# Patient Record
Sex: FEMALE | Race: WHITE | NOT HISPANIC OR LATINO | Employment: OTHER | URBAN - NONMETROPOLITAN AREA
[De-identification: names, ages, dates, MRNs, and addresses within clinical notes are randomized per-mention and may not be internally consistent; named-entity substitution may affect disease eponyms.]

---

## 2019-10-09 ENCOUNTER — TRANSCRIBE ORDERS (OUTPATIENT)
Dept: ADMINISTRATIVE | Facility: HOSPITAL | Age: 84
End: 2019-10-09

## 2019-10-09 DIAGNOSIS — R26.89 OTHER ABNORMALITIES OF GAIT AND MOBILITY: ICD-10-CM

## 2019-10-09 DIAGNOSIS — R47.89 OTHER SPEECH DISTURBANCES: Primary | ICD-10-CM

## 2019-10-11 ENCOUNTER — HOSPITAL ENCOUNTER (OUTPATIENT)
Dept: CT IMAGING | Facility: HOSPITAL | Age: 84
Discharge: HOME | End: 2019-10-11
Admitting: PHYSICIAN ASSISTANT

## 2019-10-11 DIAGNOSIS — R47.89 OTHER SPEECH DISTURBANCES: ICD-10-CM

## 2019-10-11 DIAGNOSIS — R26.89 OTHER ABNORMALITIES OF GAIT AND MOBILITY: ICD-10-CM

## 2019-10-11 PROCEDURE — 70450 CT HEAD/BRAIN W/O DYE: CPT

## 2019-12-16 ENCOUNTER — NURSING HOME (OUTPATIENT)
Dept: INTERNAL MEDICINE | Facility: CLINIC | Age: 84
End: 2019-12-16

## 2019-12-16 DIAGNOSIS — J06.9 VIRAL UPPER RESPIRATORY TRACT INFECTION: Primary | ICD-10-CM

## 2019-12-16 RX ORDER — BENZONATATE 100 MG/1
200 CAPSULE ORAL 3 TIMES DAILY PRN
Qty: 60 CAPSULE | Refills: 0 | Status: SHIPPED | OUTPATIENT
Start: 2019-12-16 | End: 2020-01-14 | Stop reason: SDUPTHER

## 2019-12-16 RX ORDER — AZITHROMYCIN 250 MG/1
TABLET, FILM COATED ORAL
Qty: 6 TABLET | Refills: 0 | OUTPATIENT
Start: 2019-12-16 | End: 2020-09-11

## 2019-12-19 ENCOUNTER — TELEPHONE (OUTPATIENT)
Dept: INTERNAL MEDICINE | Facility: CLINIC | Age: 84
End: 2019-12-19

## 2019-12-19 NOTE — TELEPHONE ENCOUNTER
I called pt's daughter, Cecile to see how she is feeling and she doesn't have her voice back although her cough is better.  She has not gotten any worse although she feel's she's better and appreciated us checking in on her.

## 2019-12-20 ENCOUNTER — OFFICE VISIT (OUTPATIENT)
Dept: INTERNAL MEDICINE | Facility: CLINIC | Age: 84
End: 2019-12-20

## 2019-12-20 VITALS
HEIGHT: 65 IN | OXYGEN SATURATION: 96 % | BODY MASS INDEX: 33.76 KG/M2 | SYSTOLIC BLOOD PRESSURE: 134 MMHG | RESPIRATION RATE: 18 BRPM | DIASTOLIC BLOOD PRESSURE: 74 MMHG | TEMPERATURE: 98.3 F | HEART RATE: 51 BPM | WEIGHT: 202.6 LBS

## 2019-12-20 DIAGNOSIS — H61.23 BILATERAL IMPACTED CERUMEN: ICD-10-CM

## 2019-12-20 DIAGNOSIS — J04.0 LARYNGITIS: ICD-10-CM

## 2019-12-20 DIAGNOSIS — R05.9 COUGH: Primary | ICD-10-CM

## 2019-12-20 DIAGNOSIS — J98.11 ATELECTASIS OF LEFT LUNG: ICD-10-CM

## 2019-12-20 PROCEDURE — 99214 OFFICE O/P EST MOD 30 MIN: CPT | Performed by: NURSE PRACTITIONER

## 2019-12-20 PROCEDURE — 69210 REMOVE IMPACTED EAR WAX UNI: CPT | Performed by: NURSE PRACTITIONER

## 2019-12-20 RX ORDER — BUSPIRONE HYDROCHLORIDE 5 MG/1
5 TABLET ORAL 3 TIMES DAILY
COMMUNITY
End: 2020-01-09 | Stop reason: SDUPTHER

## 2019-12-20 RX ORDER — MEMANTINE HYDROCHLORIDE 7 MG/1
28 CAPSULE, EXTENDED RELEASE ORAL DAILY
COMMUNITY
End: 2020-01-09 | Stop reason: SDUPTHER

## 2019-12-20 RX ORDER — AMLODIPINE BESYLATE AND BENAZEPRIL HYDROCHLORIDE 5; 20 MG/1; MG/1
1 CAPSULE ORAL DAILY
COMMUNITY
End: 2020-01-09 | Stop reason: SDUPTHER

## 2019-12-20 RX ORDER — FUROSEMIDE 20 MG/1
20 TABLET ORAL DAILY
COMMUNITY
End: 2020-01-09 | Stop reason: SDUPTHER

## 2019-12-20 RX ORDER — FEXOFENADINE HCL 180 MG/1
180 TABLET ORAL DAILY
Qty: 14 TABLET | Refills: 0 | Status: SHIPPED | OUTPATIENT
Start: 2019-12-20 | End: 2020-01-14 | Stop reason: SDUPTHER

## 2019-12-20 RX ORDER — METOPROLOL SUCCINATE 50 MG/1
50 TABLET, EXTENDED RELEASE ORAL DAILY
COMMUNITY
End: 2020-01-09 | Stop reason: SDUPTHER

## 2019-12-20 RX ORDER — CEFDINIR 300 MG/1
300 CAPSULE ORAL 2 TIMES DAILY
Qty: 20 CAPSULE | Refills: 0 | OUTPATIENT
Start: 2019-12-20 | End: 2020-09-11

## 2019-12-20 RX ORDER — ESCITALOPRAM OXALATE 20 MG/1
20 TABLET ORAL DAILY
COMMUNITY
End: 2020-01-09 | Stop reason: SDUPTHER

## 2019-12-20 NOTE — PROGRESS NOTES
"        Subjective     Chief Complaint   Patient presents with   • Laryngitis     Patient states that she was seen by Dr. Vargas at the [facility] on Monday.   • Nasal Congestion   • Cough       History of Present Illness  Pt was seen by Dr. Vargas on Monday for laryngitis and a cough. She was placed on azithromycin and tessalon. Her cough is some better, but the laryngitis has persisted. Still with some cough but is non-productive. States no fevers. No chest pain. No shortness of breath.     Patient's PMR from outside medical facility reviewed and noted.    Review of Systems   Otherwise complete ROS reviewed and negative except as mentioned in the HPI.    Past Medical History:   Past Medical History:   Diagnosis Date   • Dementia (CMS/HCC)    • Hypertension      Past Surgical History:  Past Surgical History:   Procedure Laterality Date   • HYSTERECTOMY     • KNEE MENISCAL REPAIR       Social History:  reports that she has never smoked. She has never used smokeless tobacco. She reports that she does not drink alcohol or use drugs.    Family History: family history is not on file.      Allergies:  No Known Allergies  Medications:  Prior to Admission medications    Medication Sig Start Date End Date Taking? Authorizing Provider   azithromycin (ZITHROMAX Z-MIRLANDE) 250 MG tablet Take 2 tablets the first day, then 1 tablet daily for 4 days. 12/16/19   Dmitriy Vargas DO   benzonatate (TESSALON PERLES) 100 MG capsule Take 2 capsules by mouth 3 (Three) Times a Day As Needed for Cough. 12/16/19   Dmitriy Vargas,        Objective     Vital Signs: /74 (BP Location: Right arm, Patient Position: Sitting, Cuff Size: Adult)   Pulse 51   Temp 98.3 °F (36.8 °C) (Oral)   Resp 18   Ht 165.1 cm (65\")   Wt 91.9 kg (202 lb 9.6 oz)   SpO2 96%   BMI 33.71 kg/m²     Physical Exam   Constitutional: She appears well-developed and well-nourished.   HENT:   Head: Normocephalic and atraumatic.   Right Ear: cerumen " impaction is present.  Left Ear: An impacted cerumen is present.  Mouth/Throat: Posterior oropharyngeal erythema ( drainage noted posterior pharynx. ) present.   Eyes: Pupils are equal, round, and reactive to light. EOM are normal.   Neck: Normal range of motion. Neck supple. No JVD present.   Cardiovascular: Normal rate and regular rhythm.   Pulmonary/Chest: Effort normal.   Coarse right lung base. Mildly diminished in the left lung base.    Abdominal: Soft. Bowel sounds are normal.   Musculoskeletal: She exhibits no edema or deformity.   Lymphadenopathy:     She has no cervical adenopathy.   Neurological: She is alert.   Skin: Skin is warm and dry.   Psychiatric: She has a normal mood and affect. Her behavior is normal. Judgment and thought content normal.   Vitals reviewed.    Results Reviewed:  No results found for: GLUCOSE, BUN, CREATININE, NA, K, CL, CO2, CALCIUM, ALT, AST, WBC, HCT, PLT, CHOL, TRIG, HDL, LDL, LDLHDL, HGBA1C      Assessment / Plan     Assessment/Plan:  Nichole was seen today for laryngitis, nasal congestion and cough.    Diagnoses and all orders for this visit:    Cough  -     XR Chest PA & Lateral (In Office)  - Pt declines cough syrup. Wants to continue tessalon.     Laryngitis    Bilateral impacted cerumen  /Ear Cerumen Removal  Date/Time: 12/20/2019 11:13 AM  Performed by: Bharti Nolan APRN  Authorized by: Bharti Nolan APRN     Anesthesia:  Local Anesthetic: none  Location details: left ear and right ear  Patient tolerance: Patient tolerated the procedure well with no immediate complications  Comments: Post removal of cerumen left ear shows normal TM. Irrigation right ear with mild improvement. TM intact without exudate.   Procedure type: instrumentation (Currette in the left ear with large amount of cerumen removed left ear. )      Sedation:  Patient sedated: no        Atelectasis of left lung/Early pneumonia        - Will have her complete Azithromycin and add  omnicef.     Other orders  -     fexofenadine (ALLEGRA ALLERGY) 180 MG tablet; Take 1 tablet by mouth Daily.  -     cefdinir (OMNICEF) 300 MG capsule; Take 1 capsule by mouth 2 (Two) Times a Day.      No follow-ups on file. Dr. Vargas to follow up at [facility].     I have discussed the patient results/orders and and plan/recommendation with them at today's visit.      Bharti Nolan, APRN   12/20/2019

## 2019-12-21 NOTE — PROGRESS NOTES
"        Subjective     No chief complaint on file.  Cough    History of Present Illness  Patient is currently living at the [facility].  Caregiver states that she does not drink enough.  She has had no shortness of breath, and ate lunch in the community room today.  She has had worsening cough and congestion.  She denies any fever or chills, and tells me she always has a runny nose.  She is had no color change to her sputum.  She denies any sore throat, or ear pain.  She states she has a sickness, and has a hoarse voice.  She says\" I have this occasionally.  She has been using over-the-counter cough and cold medications that have not been helping.  She is unable to take liquid because it causes vomiting.    Patient's PMR from outside medical facility reviewed and noted.    Review of Systems   Constitutional: Negative for chills and fever.   HENT: Positive for congestion. Negative for sneezing and sore throat.    Eyes: Negative for discharge and redness.   Respiratory: Positive for cough. Negative for shortness of breath.         Hoarse voice   Cardiovascular: Negative for chest pain.   Gastrointestinal: Negative for constipation, diarrhea, nausea and vomiting.   Genitourinary: Negative for dyspareunia and hematuria.   Skin: Negative for color change and wound.   Psychiatric/Behavioral:        Chronic memory impairment        Otherwise complete ROS reviewed and negative except as mentioned in the HPI.    Past Medical History:   Past Medical History:   Diagnosis Date   • Dementia (CMS/HCC)    • Hypertension      Past Surgical History:  Past Surgical History:   Procedure Laterality Date   • HYSTERECTOMY     • KNEE MENISCAL REPAIR       Social History:  reports that she has never smoked. She has never used smokeless tobacco. She reports that she does not drink alcohol or use drugs.    Family History: Heart disease    Allergies:  No Known Allergies  Medications:  Prior to Admission medications    Medication Sig Start " Date End Date Taking? Authorizing Provider   amLODIPine-benazepril (LOTREL 5-20) 5-20 MG per capsule Take 1 capsule by mouth Daily.    Delilah Meade MD   azithromycin (ZITHROMAX Z-MIRLANDE) 250 MG tablet Take 2 tablets the first day, then 1 tablet daily for 4 days. 12/16/19   Dmitriy Vargas DO   benzonatate (TESSALON PERLES) 100 MG capsule Take 2 capsules by mouth 3 (Three) Times a Day As Needed for Cough. 12/16/19   Dmitriy Vargas DO   busPIRone (BUSPAR) 5 MG tablet Take 5 mg by mouth 3 (Three) Times a Day.    Delilah Meade MD   cefdinir (OMNICEF) 300 MG capsule Take 1 capsule by mouth 2 (Two) Times a Day. 12/20/19   Bharti Nolan APRN   escitalopram (LEXAPRO) 20 MG tablet Take 20 mg by mouth Daily.    Delilah Meade MD   fexofenadine (ALLEGRA ALLERGY) 180 MG tablet Take 1 tablet by mouth Daily. 12/20/19   Bharti Nolan APRN   furosemide (LASIX) 20 MG tablet Take 20 mg by mouth Daily.    Delilah Meade MD   memantine (NAMENDA XR) 7 MG capsule sustained-release 24 hr extended release capsule Take 28 mg by mouth Daily.    Delilah Meade MD   metoprolol succinate XL (TOPROL-XL) 50 MG 24 hr tablet Take 50 mg by mouth Daily.    Delilah Meade MD       Objective     Vital Signs: There were no vitals taken for this visit.  Physical Exam   HENT:   Head: Normocephalic and atraumatic.   Nose: Nose normal.   Mouth/Throat: Oropharynx is clear and moist.   Voice is hoarse.  Clear rhinorrhea.   Eyes: Conjunctivae and EOM are normal.   Neck: Normal range of motion. Neck supple.   Cardiovascular: Normal rate, regular rhythm and normal heart sounds.   Pulmonary/Chest: Effort normal and breath sounds normal. No respiratory distress. She has no wheezes.   Abdominal: Soft. Bowel sounds are normal.   Musculoskeletal: She exhibits no edema or tenderness.   Neurological: She is alert. No cranial nerve deficit.   Skin: Skin is warm and dry.   Psychiatric: She has a  normal mood and affect.   Vitals reviewed.      Patient's There is no height or weight on file to calculate BMI. BMI is above normal parameters. Recommendations include: none (medical contraindication).      Results Reviewed:  No results found for: GLUCOSE, BUN, CREATININE, NA, K, CL, CO2, CALCIUM, ALT, AST, WBC, HCT, PLT, CHOL, TRIG, HDL, LDL, LDLHDL, HGBA1C      Assessment / Plan     Assessment/Plan:  1. Viral upper respiratory tract infection  - benzonatate (TESSALON PERLES) 100 MG capsule; Take 2 capsules by mouth 3 (Three) Times a Day As Needed for Cough.  Dispense: 60 capsule; Refill: 0  - azithromycin (ZITHROMAX Z-MIRLANDE) 250 MG tablet; Take 2 tablets the first day, then 1 tablet daily for 4 days.  Dispense: 6 tablet; Refill: 0        Return in about 4 weeks (around 1/13/2020) for Next scheduled follow up. unless patient needs to be seen sooner or acute issues arise.      I have discussed the patient results/orders and and plan/recommendation with them at today's visit.      Dmitriy Vargas, DO   12/16/2019

## 2019-12-23 ENCOUNTER — TELEPHONE (OUTPATIENT)
Dept: INTERNAL MEDICINE | Facility: CLINIC | Age: 84
End: 2019-12-23

## 2019-12-24 ENCOUNTER — NURSE TRIAGE (OUTPATIENT)
Dept: CALL CENTER | Facility: HOSPITAL | Age: 84
End: 2019-12-24

## 2019-12-24 NOTE — TELEPHONE ENCOUNTER
"Asking for something to be called in for vomiting to [place] [facility]. States she has the stomach bug and has had vomiting and diarrhea all night. Denies any allergies other than percocet. Denies cardiac problems. Called and spoke with Dr. Vargas, notified her of request. Order received to call in Zofran 8 mg ODT every 6 hours prn vomiting, dispense 30 tablets. May also use tylenol and ibuprofen for fever if needed. Stay hydrated. If s/s worsening, will need to be seen in ER.     Reason for Disposition  • [1] Request for URGENT new prescription or refill of \"essential\" medication (i.e., likelihood of harm to patient if not taken) AND [2] triager unable to fill per unit policy    Additional Information  • Negative: Drug overdose and nurse unable to answer question  • Negative: Caller requesting information not related to medicine  • Negative: Caller requesting a prescription for Strep throat and has a positive culture result  • Negative: Rash while taking a medication or within 3 days of stopping it  • Negative: Immunization reaction suspected  • Negative: [1] Asthma and [2] having symptoms of asthma (cough, wheezing, etc)  • Negative: MORE THAN A DOUBLE DOSE of a prescription or over-the-counter (OTC) drug  • Negative: [1] DOUBLE DOSE (an extra dose or lesser amount) of over-the-counter (OTC) drug AND [2] any symptoms (e.g., dizziness, nausea, pain, sleepiness)  • Negative: [1] DOUBLE DOSE (an extra dose or lesser amount) of prescription drug AND [2] any symptoms (e.g., dizziness, nausea, pain, sleepiness)  • Negative: Took another person's prescription drug  • Negative: [1] DOUBLE DOSE (an extra dose or lesser amount) of prescription drug AND [2] NO symptoms (Exception: a double dose of antibiotics)  • Negative: Diabetes drug error or overdose (e.g., insulin or extra dose)  • Negative: [1] Prescription not at pharmacy AND [2] was prescribed today by PCP  • Negative: Pharmacy calling with prescription questions and " "triager unable to answer question  • Negative: Caller has URGENT medication question about med that PCP prescribed and triager unable to answer question    Answer Assessment - Initial Assessment Questions  1. SYMPTOMS: \"Do you have any symptoms?\"      See note  2. SEVERITY: If symptoms are present, ask \"Are they mild, moderate or severe?\"      See note    Protocols used: MEDICATION QUESTION CALL-ADULT-      "

## 2019-12-26 ENCOUNTER — TELEPHONE (OUTPATIENT)
Dept: INTERNAL MEDICINE | Facility: CLINIC | Age: 84
End: 2019-12-26

## 2019-12-26 NOTE — TELEPHONE ENCOUNTER
If her cough is better and her laryngitis is better ok to leave off the omnicef. It can cause nausea/vomiting and diarrhea. She should be ok without the omnicef if her symptoms are better.

## 2019-12-26 NOTE — TELEPHONE ENCOUNTER
Pt daughter Cecile called [facility] that they thought pt's antibiotic may have been causing her to have the vomitting/diarrhea,  She finished the zitho antibiotic, but they stopped the other one and her symptons have stopped,   She said she never felt bad and didn't think it was a bug,    Does she need to be on another antibiotic?  (note from Nurse from 12/24/2019 in chart also)

## 2020-01-01 ENCOUNTER — APPOINTMENT (OUTPATIENT)
Dept: CT IMAGING | Facility: HOSPITAL | Age: 85
End: 2020-01-01

## 2020-01-01 ENCOUNTER — APPOINTMENT (OUTPATIENT)
Dept: GENERAL RADIOLOGY | Facility: HOSPITAL | Age: 85
End: 2020-01-01

## 2020-01-01 ENCOUNTER — READMISSION MANAGEMENT (OUTPATIENT)
Dept: CALL CENTER | Facility: HOSPITAL | Age: 85
End: 2020-01-01

## 2020-01-01 ENCOUNTER — HOSPITAL ENCOUNTER (INPATIENT)
Facility: HOSPITAL | Age: 85
LOS: 3 days | Discharge: HOME HEALTH | End: 2020-11-11
Attending: FAMILY MEDICINE | Admitting: FAMILY MEDICINE

## 2020-01-01 ENCOUNTER — HOSPITAL ENCOUNTER (OUTPATIENT)
Facility: HOSPITAL | Age: 85
Setting detail: OBSERVATION
Discharge: HOME | End: 2020-12-04
Attending: EMERGENCY MEDICINE | Admitting: FAMILY MEDICINE

## 2020-01-01 ENCOUNTER — APPOINTMENT (OUTPATIENT)
Dept: CARDIOLOGY | Facility: HOSPITAL | Age: 85
End: 2020-01-01

## 2020-01-01 VITALS
OXYGEN SATURATION: 100 % | SYSTOLIC BLOOD PRESSURE: 154 MMHG | WEIGHT: 182 LBS | BODY MASS INDEX: 31.07 KG/M2 | RESPIRATION RATE: 14 BRPM | TEMPERATURE: 97.8 F | DIASTOLIC BLOOD PRESSURE: 66 MMHG | HEART RATE: 64 BPM | HEIGHT: 64 IN

## 2020-01-01 VITALS
TEMPERATURE: 97.5 F | DIASTOLIC BLOOD PRESSURE: 55 MMHG | BODY MASS INDEX: 29.81 KG/M2 | OXYGEN SATURATION: 95 % | HEIGHT: 65 IN | HEART RATE: 56 BPM | WEIGHT: 178.9 LBS | RESPIRATION RATE: 18 BRPM | SYSTOLIC BLOOD PRESSURE: 137 MMHG

## 2020-01-01 VITALS
TEMPERATURE: 97.7 F | BODY MASS INDEX: 32.42 KG/M2 | HEART RATE: 50 BPM | DIASTOLIC BLOOD PRESSURE: 82 MMHG | WEIGHT: 182.98 LBS | HEIGHT: 63 IN | RESPIRATION RATE: 18 BRPM | OXYGEN SATURATION: 98 % | SYSTOLIC BLOOD PRESSURE: 142 MMHG

## 2020-01-01 DIAGNOSIS — R41.82 ALTERED MENTAL STATUS, UNSPECIFIED ALTERED MENTAL STATUS TYPE: ICD-10-CM

## 2020-01-01 DIAGNOSIS — R77.8 ELEVATED TROPONIN: ICD-10-CM

## 2020-01-01 DIAGNOSIS — R41.82 ALTERED MENTAL STATUS, UNSPECIFIED ALTERED MENTAL STATUS TYPE: Primary | ICD-10-CM

## 2020-01-01 DIAGNOSIS — I26.09 ACUTE PULMONARY EMBOLISM WITH ACUTE COR PULMONALE, UNSPECIFIED PULMONARY EMBOLISM TYPE (HCC): Primary | ICD-10-CM

## 2020-01-01 DIAGNOSIS — F03.90 DEMENTIA WITHOUT BEHAVIORAL DISTURBANCE, UNSPECIFIED DEMENTIA TYPE: ICD-10-CM

## 2020-01-01 DIAGNOSIS — R13.10 DYSPHAGIA, UNSPECIFIED TYPE: ICD-10-CM

## 2020-01-01 DIAGNOSIS — N39.0 ACUTE UTI: ICD-10-CM

## 2020-01-01 LAB
ALBUMIN SERPL-MCNC: 2.8 G/DL (ref 3.5–5.2)
ALBUMIN SERPL-MCNC: 3.3 G/DL (ref 3.5–5.2)
ALBUMIN SERPL-MCNC: 3.4 G/DL (ref 3.5–5.2)
ALBUMIN/GLOB SERPL: 1.4 G/DL
ALBUMIN/GLOB SERPL: 1.4 G/DL
ALBUMIN/GLOB SERPL: 1.5 G/DL
ALP SERPL-CCNC: 103 U/L (ref 39–117)
ALP SERPL-CCNC: 104 U/L (ref 39–117)
ALP SERPL-CCNC: 123 U/L (ref 39–117)
ALT SERPL W P-5'-P-CCNC: 11 U/L (ref 1–33)
ALT SERPL W P-5'-P-CCNC: 16 U/L (ref 1–33)
ALT SERPL W P-5'-P-CCNC: 9 U/L (ref 1–33)
ANION GAP SERPL CALCULATED.3IONS-SCNC: 4 MMOL/L (ref 5–15)
ANION GAP SERPL CALCULATED.3IONS-SCNC: 4 MMOL/L (ref 5–15)
ANION GAP SERPL CALCULATED.3IONS-SCNC: 6 MMOL/L (ref 5–15)
ANION GAP SERPL CALCULATED.3IONS-SCNC: 6 MMOL/L (ref 5–15)
ANION GAP SERPL CALCULATED.3IONS-SCNC: 7 MMOL/L (ref 5–15)
ANION GAP SERPL CALCULATED.3IONS-SCNC: 8 MMOL/L (ref 5–15)
APTT PPP: 41.4 SECONDS (ref 24.1–35)
ARTERIAL PATENCY WRIST A: POSITIVE
ARTERIAL PATENCY WRIST A: POSITIVE
AST SERPL-CCNC: 18 U/L (ref 1–32)
AST SERPL-CCNC: 23 U/L (ref 1–32)
AST SERPL-CCNC: 33 U/L (ref 1–32)
ATMOSPHERIC PRESS: 754 MMHG
ATMOSPHERIC PRESS: 757 MMHG
B PARAPERT DNA SPEC QL NAA+PROBE: NOT DETECTED
B PERT DNA SPEC QL NAA+PROBE: NOT DETECTED
BACTERIA SPEC AEROBE CULT: ABNORMAL
BACTERIA SPEC AEROBE CULT: NORMAL
BACTERIA UR QL AUTO: ABNORMAL /HPF
BACTERIA UR QL AUTO: ABNORMAL /HPF
BASE EXCESS BLDA CALC-SCNC: 3.7 MMOL/L (ref 0–2)
BASE EXCESS BLDA CALC-SCNC: 7 MMOL/L (ref 0–2)
BASOPHILS # BLD AUTO: 0.05 10*3/MM3 (ref 0–0.2)
BASOPHILS # BLD AUTO: 0.07 10*3/MM3 (ref 0–0.2)
BASOPHILS # BLD AUTO: 0.08 10*3/MM3 (ref 0–0.2)
BASOPHILS NFR BLD AUTO: 0.7 % (ref 0–1.5)
BASOPHILS NFR BLD AUTO: 0.9 % (ref 0–1.5)
BASOPHILS NFR BLD AUTO: 1 % (ref 0–1.5)
BDY SITE: ABNORMAL
BDY SITE: ABNORMAL
BH CV ECHO MEAS - AO MAX PG (FULL): 8.4 MMHG
BH CV ECHO MEAS - AO MAX PG: 10.2 MMHG
BH CV ECHO MEAS - AO MEAN PG (FULL): 5 MMHG
BH CV ECHO MEAS - AO MEAN PG: 6 MMHG
BH CV ECHO MEAS - AO ROOT AREA (BSA CORRECTED): 1.8
BH CV ECHO MEAS - AO ROOT AREA: 8.6 CM^2
BH CV ECHO MEAS - AO ROOT DIAM: 3.3 CM
BH CV ECHO MEAS - AO V2 MAX: 160 CM/SEC
BH CV ECHO MEAS - AO V2 MEAN: 116 CM/SEC
BH CV ECHO MEAS - AO V2 VTI: 40.6 CM
BH CV ECHO MEAS - AVA(I,A): 1.4 CM^2
BH CV ECHO MEAS - AVA(I,D): 1.4 CM^2
BH CV ECHO MEAS - AVA(V,A): 1.1 CM^2
BH CV ECHO MEAS - AVA(V,D): 1.1 CM^2
BH CV ECHO MEAS - BSA(HAYCOCK): 1.9 M^2
BH CV ECHO MEAS - BSA: 1.9 M^2
BH CV ECHO MEAS - BZI_BMI: 32.2 KILOGRAMS/M^2
BH CV ECHO MEAS - BZI_METRIC_HEIGHT: 160 CM
BH CV ECHO MEAS - BZI_METRIC_WEIGHT: 82.6 KG
BH CV ECHO MEAS - EDV(CUBED): 139.8 ML
BH CV ECHO MEAS - EDV(MOD-SP4): 102 ML
BH CV ECHO MEAS - EDV(TEICH): 128.9 ML
BH CV ECHO MEAS - EF(CUBED): 65.5 %
BH CV ECHO MEAS - EF(MOD-SP4): 55.5 %
BH CV ECHO MEAS - EF(TEICH): 56.6 %
BH CV ECHO MEAS - ESV(CUBED): 48.2 ML
BH CV ECHO MEAS - ESV(MOD-SP4): 45.4 ML
BH CV ECHO MEAS - ESV(TEICH): 55.9 ML
BH CV ECHO MEAS - FS: 29.9 %
BH CV ECHO MEAS - IVS/LVPW: 1.1
BH CV ECHO MEAS - IVSD: 0.77 CM
BH CV ECHO MEAS - LAT PEAK E' VEL: 5.1 CM/SEC
BH CV ECHO MEAS - LV DIASTOLIC VOL/BSA (35-75): 54.9 ML/M^2
BH CV ECHO MEAS - LV MASS(C)D: 129.7 GRAMS
BH CV ECHO MEAS - LV MASS(C)DI: 69.8 GRAMS/M^2
BH CV ECHO MEAS - LV MAX PG: 1.8 MMHG
BH CV ECHO MEAS - LV MEAN PG: 1 MMHG
BH CV ECHO MEAS - LV SYSTOLIC VOL/BSA (12-30): 24.4 ML/M^2
BH CV ECHO MEAS - LV V1 MAX: 67.2 CM/SEC
BH CV ECHO MEAS - LV V1 MEAN: 51.5 CM/SEC
BH CV ECHO MEAS - LV V1 VTI: 22 CM
BH CV ECHO MEAS - LVIDD: 5.2 CM
BH CV ECHO MEAS - LVIDS: 3.6 CM
BH CV ECHO MEAS - LVLD AP4: 6.4 CM
BH CV ECHO MEAS - LVLS AP4: 6 CM
BH CV ECHO MEAS - LVOT AREA (M): 2.5 CM^2
BH CV ECHO MEAS - LVOT AREA: 2.5 CM^2
BH CV ECHO MEAS - LVOT DIAM: 1.8 CM
BH CV ECHO MEAS - LVPWD: 0.7 CM
BH CV ECHO MEAS - MED PEAK E' VEL: 5 CM/SEC
BH CV ECHO MEAS - MV A MAX VEL: 91.5 CM/SEC
BH CV ECHO MEAS - MV DEC SLOPE: 112 CM/SEC^2
BH CV ECHO MEAS - MV DEC TIME: 0.51 SEC
BH CV ECHO MEAS - MV E MAX VEL: 51.8 CM/SEC
BH CV ECHO MEAS - MV E/A: 0.57
BH CV ECHO MEAS - MV P1/2T MAX VEL: 75.3 CM/SEC
BH CV ECHO MEAS - MV P1/2T: 196.9 MSEC
BH CV ECHO MEAS - MVA P1/2T LCG: 2.9 CM^2
BH CV ECHO MEAS - MVA(P1/2T): 1.1 CM^2
BH CV ECHO MEAS - SI(AO): 186.9 ML/M^2
BH CV ECHO MEAS - SI(CUBED): 49.3 ML/M^2
BH CV ECHO MEAS - SI(LVOT): 30.1 ML/M^2
BH CV ECHO MEAS - SI(MOD-SP4): 30.5 ML/M^2
BH CV ECHO MEAS - SI(TEICH): 39.3 ML/M^2
BH CV ECHO MEAS - SV(AO): 347.3 ML
BH CV ECHO MEAS - SV(CUBED): 91.6 ML
BH CV ECHO MEAS - SV(LVOT): 56 ML
BH CV ECHO MEAS - SV(MOD-SP4): 56.6 ML
BH CV ECHO MEAS - SV(TEICH): 73 ML
BH CV ECHO MEASUREMENTS AVERAGE E/E' RATIO: 10.26
BILIRUB SERPL-MCNC: 0.4 MG/DL (ref 0–1.2)
BILIRUB SERPL-MCNC: 0.4 MG/DL (ref 0–1.2)
BILIRUB SERPL-MCNC: 0.5 MG/DL (ref 0–1.2)
BILIRUB UR QL STRIP: NEGATIVE
BODY TEMPERATURE: 37 C
BODY TEMPERATURE: 37 C
BUN SERPL-MCNC: 21 MG/DL (ref 8–23)
BUN SERPL-MCNC: 23 MG/DL (ref 8–23)
BUN SERPL-MCNC: 27 MG/DL (ref 8–23)
BUN SERPL-MCNC: 32 MG/DL (ref 8–23)
BUN SERPL-MCNC: 34 MG/DL (ref 8–23)
BUN SERPL-MCNC: 38 MG/DL (ref 8–23)
BUN/CREAT SERPL: 17.4 (ref 7–25)
BUN/CREAT SERPL: 18.4 (ref 7–25)
BUN/CREAT SERPL: 18.8 (ref 7–25)
BUN/CREAT SERPL: 23.9 (ref 7–25)
BUN/CREAT SERPL: 27.5 (ref 7–25)
BUN/CREAT SERPL: 28.3 (ref 7–25)
C PNEUM DNA NPH QL NAA+NON-PROBE: NOT DETECTED
CALCIUM SPEC-SCNC: 10.4 MG/DL (ref 8.6–10.5)
CALCIUM SPEC-SCNC: 10.5 MG/DL (ref 8.6–10.5)
CALCIUM SPEC-SCNC: 11.3 MG/DL (ref 8.6–10.5)
CALCIUM SPEC-SCNC: 11.4 MG/DL (ref 8.6–10.5)
CALCIUM SPEC-SCNC: 9.4 MG/DL (ref 8.6–10.5)
CALCIUM SPEC-SCNC: 9.6 MG/DL (ref 8.6–10.5)
CHLORIDE SERPL-SCNC: 104 MMOL/L (ref 98–107)
CHLORIDE SERPL-SCNC: 104 MMOL/L (ref 98–107)
CHLORIDE SERPL-SCNC: 105 MMOL/L (ref 98–107)
CHLORIDE SERPL-SCNC: 108 MMOL/L (ref 98–107)
CHLORIDE SERPL-SCNC: 110 MMOL/L (ref 98–107)
CHLORIDE SERPL-SCNC: 113 MMOL/L (ref 98–107)
CLARITY UR: ABNORMAL
CLARITY UR: CLEAR
CLARITY UR: CLEAR
CO2 SERPL-SCNC: 28 MMOL/L (ref 22–29)
CO2 SERPL-SCNC: 28 MMOL/L (ref 22–29)
CO2 SERPL-SCNC: 29 MMOL/L (ref 22–29)
CO2 SERPL-SCNC: 29 MMOL/L (ref 22–29)
CO2 SERPL-SCNC: 31 MMOL/L (ref 22–29)
CO2 SERPL-SCNC: 34 MMOL/L (ref 22–29)
COLOR UR: YELLOW
CREAT SERPL-MCNC: 1.13 MG/DL (ref 0.57–1)
CREAT SERPL-MCNC: 1.2 MG/DL (ref 0.57–1)
CREAT SERPL-MCNC: 1.21 MG/DL (ref 0.57–1)
CREAT SERPL-MCNC: 1.25 MG/DL (ref 0.57–1)
CREAT SERPL-MCNC: 1.38 MG/DL (ref 0.57–1)
CREAT SERPL-MCNC: 1.7 MG/DL (ref 0.57–1)
D DIMER PPP FEU-MCNC: 2.54 MG/L (FEU) (ref 0–0.5)
D-LACTATE SERPL-SCNC: 1 MMOL/L (ref 0.5–2)
D-LACTATE SERPL-SCNC: 1 MMOL/L (ref 0.5–2)
DEPRECATED RDW RBC AUTO: 44.4 FL (ref 37–54)
DEPRECATED RDW RBC AUTO: 44.6 FL (ref 37–54)
DEPRECATED RDW RBC AUTO: 51.6 FL (ref 37–54)
EOSINOPHIL # BLD AUTO: 0.13 10*3/MM3 (ref 0–0.4)
EOSINOPHIL # BLD AUTO: 0.22 10*3/MM3 (ref 0–0.4)
EOSINOPHIL # BLD AUTO: 0.32 10*3/MM3 (ref 0–0.4)
EOSINOPHIL NFR BLD AUTO: 1.5 % (ref 0.3–6.2)
EOSINOPHIL NFR BLD AUTO: 3.3 % (ref 0.3–6.2)
EOSINOPHIL NFR BLD AUTO: 4.4 % (ref 0.3–6.2)
ERYTHROCYTE [DISTWIDTH] IN BLOOD BY AUTOMATED COUNT: 13.9 % (ref 12.3–15.4)
ERYTHROCYTE [DISTWIDTH] IN BLOOD BY AUTOMATED COUNT: 14 % (ref 12.3–15.4)
ERYTHROCYTE [DISTWIDTH] IN BLOOD BY AUTOMATED COUNT: 15.2 % (ref 12.3–15.4)
FLUAV H1 2009 PAND RNA NPH QL NAA+PROBE: NOT DETECTED
FLUAV H1 HA GENE NPH QL NAA+PROBE: NOT DETECTED
FLUAV H3 RNA NPH QL NAA+PROBE: NOT DETECTED
FLUAV SUBTYP SPEC NAA+PROBE: NOT DETECTED
FLUBV RNA ISLT QL NAA+PROBE: NOT DETECTED
GFR SERPL CREATININE-BSD FRML MDRD: 28 ML/MIN/1.73
GFR SERPL CREATININE-BSD FRML MDRD: 36 ML/MIN/1.73
GFR SERPL CREATININE-BSD FRML MDRD: 41 ML/MIN/1.73
GFR SERPL CREATININE-BSD FRML MDRD: 42 ML/MIN/1.73
GFR SERPL CREATININE-BSD FRML MDRD: 42 ML/MIN/1.73
GFR SERPL CREATININE-BSD FRML MDRD: 46 ML/MIN/1.73
GLOBULIN UR ELPH-MCNC: 2 GM/DL
GLOBULIN UR ELPH-MCNC: 2.3 GM/DL
GLOBULIN UR ELPH-MCNC: 2.4 GM/DL
GLUCOSE SERPL-MCNC: 113 MG/DL (ref 65–99)
GLUCOSE SERPL-MCNC: 79 MG/DL (ref 65–99)
GLUCOSE SERPL-MCNC: 86 MG/DL (ref 65–99)
GLUCOSE SERPL-MCNC: 88 MG/DL (ref 65–99)
GLUCOSE SERPL-MCNC: 93 MG/DL (ref 65–99)
GLUCOSE SERPL-MCNC: 99 MG/DL (ref 65–99)
GLUCOSE UR STRIP-MCNC: NEGATIVE MG/DL
HADV DNA SPEC NAA+PROBE: NOT DETECTED
HCO3 BLDA-SCNC: 28.6 MMOL/L (ref 20–26)
HCO3 BLDA-SCNC: 32.8 MMOL/L (ref 20–26)
HCOV 229E RNA SPEC QL NAA+PROBE: NOT DETECTED
HCOV HKU1 RNA SPEC QL NAA+PROBE: NOT DETECTED
HCOV NL63 RNA SPEC QL NAA+PROBE: NOT DETECTED
HCOV OC43 RNA SPEC QL NAA+PROBE: NOT DETECTED
HCT VFR BLD AUTO: 35.3 % (ref 34–46.6)
HCT VFR BLD AUTO: 38 % (ref 34–46.6)
HCT VFR BLD AUTO: 40.5 % (ref 34–46.6)
HGB BLD-MCNC: 11.9 G/DL (ref 12–15.9)
HGB BLD-MCNC: 12.2 G/DL (ref 12–15.9)
HGB BLD-MCNC: 13.9 G/DL (ref 12–15.9)
HGB UR QL STRIP.AUTO: ABNORMAL
HGB UR QL STRIP.AUTO: ABNORMAL
HGB UR QL STRIP.AUTO: NEGATIVE
HMPV RNA NPH QL NAA+NON-PROBE: NOT DETECTED
HPIV1 RNA SPEC QL NAA+PROBE: NOT DETECTED
HPIV2 RNA SPEC QL NAA+PROBE: NOT DETECTED
HPIV3 RNA NPH QL NAA+PROBE: NOT DETECTED
HPIV4 P GENE NPH QL NAA+PROBE: NOT DETECTED
HYALINE CASTS UR QL AUTO: ABNORMAL /LPF
HYALINE CASTS UR QL AUTO: ABNORMAL /LPF
IMM GRANULOCYTES # BLD AUTO: 0.02 10*3/MM3 (ref 0–0.05)
IMM GRANULOCYTES # BLD AUTO: 0.02 10*3/MM3 (ref 0–0.05)
IMM GRANULOCYTES # BLD AUTO: 0.03 10*3/MM3 (ref 0–0.05)
IMM GRANULOCYTES NFR BLD AUTO: 0.3 % (ref 0–0.5)
INR PPP: 2.76 (ref 0.91–1.09)
KETONES UR QL STRIP: NEGATIVE
LEUKOCYTE ESTERASE UR QL STRIP.AUTO: ABNORMAL
LEUKOCYTE ESTERASE UR QL STRIP.AUTO: NEGATIVE
LEUKOCYTE ESTERASE UR QL STRIP.AUTO: NEGATIVE
LYMPHOCYTES # BLD AUTO: 2 10*3/MM3 (ref 0.7–3.1)
LYMPHOCYTES # BLD AUTO: 2.67 10*3/MM3 (ref 0.7–3.1)
LYMPHOCYTES # BLD AUTO: 2.85 10*3/MM3 (ref 0.7–3.1)
LYMPHOCYTES NFR BLD AUTO: 27.4 % (ref 19.6–45.3)
LYMPHOCYTES NFR BLD AUTO: 32.8 % (ref 19.6–45.3)
LYMPHOCYTES NFR BLD AUTO: 39.7 % (ref 19.6–45.3)
Lab: ABNORMAL
Lab: ABNORMAL
M PNEUMO IGG SER IA-ACNC: NOT DETECTED
MAXIMAL PREDICTED HEART RATE: 133 BPM
MCH RBC QN AUTO: 29.9 PG (ref 26.6–33)
MCH RBC QN AUTO: 30.2 PG (ref 26.6–33)
MCH RBC QN AUTO: 30.3 PG (ref 26.6–33)
MCHC RBC AUTO-ENTMCNC: 32.1 G/DL (ref 31.5–35.7)
MCHC RBC AUTO-ENTMCNC: 33.7 G/DL (ref 31.5–35.7)
MCHC RBC AUTO-ENTMCNC: 34.3 G/DL (ref 31.5–35.7)
MCV RBC AUTO: 87.9 FL (ref 79–97)
MCV RBC AUTO: 88.7 FL (ref 79–97)
MCV RBC AUTO: 94.3 FL (ref 79–97)
MODALITY: ABNORMAL
MODALITY: ABNORMAL
MONOCYTES # BLD AUTO: 0.43 10*3/MM3 (ref 0.1–0.9)
MONOCYTES # BLD AUTO: 0.51 10*3/MM3 (ref 0.1–0.9)
MONOCYTES # BLD AUTO: 0.6 10*3/MM3 (ref 0.1–0.9)
MONOCYTES NFR BLD AUTO: 5.9 % (ref 5–12)
MONOCYTES NFR BLD AUTO: 6.9 % (ref 5–12)
MONOCYTES NFR BLD AUTO: 7.6 % (ref 5–12)
NEUTROPHILS NFR BLD AUTO: 3.24 10*3/MM3 (ref 1.7–7)
NEUTROPHILS NFR BLD AUTO: 4.49 10*3/MM3 (ref 1.7–7)
NEUTROPHILS NFR BLD AUTO: 48.1 % (ref 42.7–76)
NEUTROPHILS NFR BLD AUTO: 5 10*3/MM3 (ref 1.7–7)
NEUTROPHILS NFR BLD AUTO: 57.6 % (ref 42.7–76)
NEUTROPHILS NFR BLD AUTO: 61.3 % (ref 42.7–76)
NITRITE UR QL STRIP: NEGATIVE
NITRITE UR QL STRIP: NEGATIVE
NITRITE UR QL STRIP: POSITIVE
NRBC BLD AUTO-RTO: 0 /100 WBC (ref 0–0.2)
NT-PROBNP SERPL-MCNC: 215.9 PG/ML (ref 0–1800)
PCO2 BLDA: 43.3 MM HG (ref 35–45)
PCO2 BLDA: 51 MM HG (ref 35–45)
PCO2 TEMP ADJ BLD: 43.3 MM HG (ref 35–45)
PCO2 TEMP ADJ BLD: 51 MM HG (ref 35–45)
PH BLDA: 7.42 PH UNITS (ref 7.35–7.45)
PH BLDA: 7.43 PH UNITS (ref 7.35–7.45)
PH UR STRIP.AUTO: 5.5 [PH] (ref 5–8)
PH UR STRIP.AUTO: 5.5 [PH] (ref 5–8)
PH UR STRIP.AUTO: <=5 [PH] (ref 5–8)
PH, TEMP CORRECTED: 7.42 PH UNITS (ref 7.35–7.45)
PH, TEMP CORRECTED: 7.43 PH UNITS (ref 7.35–7.45)
PLATELET # BLD AUTO: 195 10*3/MM3 (ref 140–450)
PLATELET # BLD AUTO: 218 10*3/MM3 (ref 140–450)
PLATELET # BLD AUTO: 234 10*3/MM3 (ref 140–450)
PMV BLD AUTO: 10.7 FL (ref 6–12)
PMV BLD AUTO: 10.7 FL (ref 6–12)
PMV BLD AUTO: 11 FL (ref 6–12)
PO2 BLDA: 60.7 MM HG (ref 83–108)
PO2 BLDA: 67.8 MM HG (ref 83–108)
PO2 TEMP ADJ BLD: 60.7 MM HG (ref 83–108)
PO2 TEMP ADJ BLD: 67.8 MM HG (ref 83–108)
POTASSIUM SERPL-SCNC: 3.2 MMOL/L (ref 3.5–5.2)
POTASSIUM SERPL-SCNC: 3.5 MMOL/L (ref 3.5–5.2)
POTASSIUM SERPL-SCNC: 3.5 MMOL/L (ref 3.5–5.2)
POTASSIUM SERPL-SCNC: 3.7 MMOL/L (ref 3.5–5.2)
POTASSIUM SERPL-SCNC: 3.8 MMOL/L (ref 3.5–5.2)
PROT SERPL-MCNC: 4.8 G/DL (ref 6–8.5)
PROT SERPL-MCNC: 5.7 G/DL (ref 6–8.5)
PROT SERPL-MCNC: 5.7 G/DL (ref 6–8.5)
PROT UR QL STRIP: NEGATIVE
PROTHROMBIN TIME: 29.2 SECONDS (ref 11.9–14.6)
QT INTERVAL: 432 MS
QT INTERVAL: 468 MS
QT INTERVAL: 502 MS
QTC INTERVAL: 416 MS
QTC INTERVAL: 422 MS
QTC INTERVAL: 448 MS
RBC # BLD AUTO: 3.98 10*6/MM3 (ref 3.77–5.28)
RBC # BLD AUTO: 4.03 10*6/MM3 (ref 3.77–5.28)
RBC # BLD AUTO: 4.61 10*6/MM3 (ref 3.77–5.28)
RBC # UR: ABNORMAL /HPF
RBC # UR: ABNORMAL /HPF
REF LAB TEST METHOD: ABNORMAL
REF LAB TEST METHOD: ABNORMAL
RHINOVIRUS RNA SPEC NAA+PROBE: NOT DETECTED
RSV RNA NPH QL NAA+NON-PROBE: NOT DETECTED
SAO2 % BLDCOA: 93.7 % (ref 94–99)
SAO2 % BLDCOA: 95.6 % (ref 94–99)
SARS-COV-2 RDRP RESP QL NAA+PROBE: NOT DETECTED
SARS-COV-2 RNA NPH QL NAA+NON-PROBE: NOT DETECTED
SARS-COV-2 RNA PNL SPEC NAA+PROBE: NOT DETECTED
SARS-COV-2 RNA PNL SPEC NAA+PROBE: NOT DETECTED
SODIUM SERPL-SCNC: 141 MMOL/L (ref 136–145)
SODIUM SERPL-SCNC: 141 MMOL/L (ref 136–145)
SODIUM SERPL-SCNC: 142 MMOL/L (ref 136–145)
SODIUM SERPL-SCNC: 144 MMOL/L (ref 136–145)
SODIUM SERPL-SCNC: 145 MMOL/L (ref 136–145)
SODIUM SERPL-SCNC: 145 MMOL/L (ref 136–145)
SP GR UR STRIP: 1.01 (ref 1–1.03)
SP GR UR STRIP: 1.02 (ref 1–1.03)
SP GR UR STRIP: 1.02 (ref 1–1.03)
SQUAMOUS #/AREA URNS HPF: ABNORMAL /HPF
SQUAMOUS #/AREA URNS HPF: ABNORMAL /HPF
STRESS TARGET HR: 113 BPM
TROPONIN T SERPL-MCNC: 0.03 NG/ML (ref 0–0.03)
TROPONIN T SERPL-MCNC: 0.03 NG/ML (ref 0–0.03)
UROBILINOGEN UR QL STRIP: ABNORMAL
UROBILINOGEN UR QL STRIP: ABNORMAL
UROBILINOGEN UR QL STRIP: NORMAL
VENTILATOR MODE: ABNORMAL
VENTILATOR MODE: ABNORMAL
WBC # BLD AUTO: 6.73 10*3/MM3 (ref 3.4–10.8)
WBC # BLD AUTO: 7.31 10*3/MM3 (ref 3.4–10.8)
WBC # BLD AUTO: 8.69 10*3/MM3 (ref 3.4–10.8)
WBC UR QL AUTO: ABNORMAL /HPF
WBC UR QL AUTO: ABNORMAL /HPF
WHOLE BLOOD HOLD SPECIMEN: NORMAL

## 2020-01-01 PROCEDURE — 82803 BLOOD GASES ANY COMBINATION: CPT

## 2020-01-01 PROCEDURE — 81003 URINALYSIS AUTO W/O SCOPE: CPT | Performed by: PHYSICIAN ASSISTANT

## 2020-01-01 PROCEDURE — 81001 URINALYSIS AUTO W/SCOPE: CPT | Performed by: EMERGENCY MEDICINE

## 2020-01-01 PROCEDURE — 87186 SC STD MICRODIL/AGAR DIL: CPT | Performed by: EMERGENCY MEDICINE

## 2020-01-01 PROCEDURE — G0378 HOSPITAL OBSERVATION PER HR: HCPCS

## 2020-01-01 PROCEDURE — 99285 EMERGENCY DEPT VISIT HI MDM: CPT

## 2020-01-01 PROCEDURE — 25010000002 PERFLUTREN 6.52 MG/ML SUSPENSION: Performed by: FAMILY MEDICINE

## 2020-01-01 PROCEDURE — 96361 HYDRATE IV INFUSION ADD-ON: CPT

## 2020-01-01 PROCEDURE — 36415 COLL VENOUS BLD VENIPUNCTURE: CPT

## 2020-01-01 PROCEDURE — 70450 CT HEAD/BRAIN W/O DYE: CPT

## 2020-01-01 PROCEDURE — 87040 BLOOD CULTURE FOR BACTERIA: CPT | Performed by: NURSE PRACTITIONER

## 2020-01-01 PROCEDURE — 36415 COLL VENOUS BLD VENIPUNCTURE: CPT | Performed by: NURSE PRACTITIONER

## 2020-01-01 PROCEDURE — 0202U NFCT DS 22 TRGT SARS-COV-2: CPT | Performed by: NURSE PRACTITIONER

## 2020-01-01 PROCEDURE — 85610 PROTHROMBIN TIME: CPT | Performed by: EMERGENCY MEDICINE

## 2020-01-01 PROCEDURE — 80048 BASIC METABOLIC PNL TOTAL CA: CPT | Performed by: FAMILY MEDICINE

## 2020-01-01 PROCEDURE — 93005 ELECTROCARDIOGRAM TRACING: CPT | Performed by: EMERGENCY MEDICINE

## 2020-01-01 PROCEDURE — 80053 COMPREHEN METABOLIC PANEL: CPT | Performed by: EMERGENCY MEDICINE

## 2020-01-01 PROCEDURE — 85730 THROMBOPLASTIN TIME PARTIAL: CPT | Performed by: EMERGENCY MEDICINE

## 2020-01-01 PROCEDURE — 0 IOPAMIDOL PER 1 ML: Performed by: NURSE PRACTITIONER

## 2020-01-01 PROCEDURE — 96366 THER/PROPH/DIAG IV INF ADDON: CPT

## 2020-01-01 PROCEDURE — C9803 HOPD COVID-19 SPEC COLLECT: HCPCS

## 2020-01-01 PROCEDURE — 25010000002 CEFTRIAXONE PER 250 MG: Performed by: FAMILY MEDICINE

## 2020-01-01 PROCEDURE — 80053 COMPREHEN METABOLIC PANEL: CPT | Performed by: FAMILY MEDICINE

## 2020-01-01 PROCEDURE — 80053 COMPREHEN METABOLIC PANEL: CPT | Performed by: NURSE PRACTITIONER

## 2020-01-01 PROCEDURE — 25010000002 CEFTRIAXONE PER 250 MG: Performed by: EMERGENCY MEDICINE

## 2020-01-01 PROCEDURE — 84484 ASSAY OF TROPONIN QUANT: CPT | Performed by: NURSE PRACTITIONER

## 2020-01-01 PROCEDURE — 85025 COMPLETE CBC W/AUTO DIFF WBC: CPT | Performed by: EMERGENCY MEDICINE

## 2020-01-01 PROCEDURE — 87635 SARS-COV-2 COVID-19 AMP PRB: CPT | Performed by: FAMILY MEDICINE

## 2020-01-01 PROCEDURE — 81001 URINALYSIS AUTO W/SCOPE: CPT | Performed by: NURSE PRACTITIONER

## 2020-01-01 PROCEDURE — 84132 ASSAY OF SERUM POTASSIUM: CPT | Performed by: FAMILY MEDICINE

## 2020-01-01 PROCEDURE — P9612 CATHETERIZE FOR URINE SPEC: HCPCS

## 2020-01-01 PROCEDURE — 85379 FIBRIN DEGRADATION QUANT: CPT | Performed by: NURSE PRACTITIONER

## 2020-01-01 PROCEDURE — 80048 BASIC METABOLIC PNL TOTAL CA: CPT | Performed by: NURSE PRACTITIONER

## 2020-01-01 PROCEDURE — 85025 COMPLETE CBC W/AUTO DIFF WBC: CPT | Performed by: FAMILY MEDICINE

## 2020-01-01 PROCEDURE — 87088 URINE BACTERIA CULTURE: CPT | Performed by: EMERGENCY MEDICINE

## 2020-01-01 PROCEDURE — 87040 BLOOD CULTURE FOR BACTERIA: CPT | Performed by: EMERGENCY MEDICINE

## 2020-01-01 PROCEDURE — 71275 CT ANGIOGRAPHY CHEST: CPT

## 2020-01-01 PROCEDURE — 93010 ELECTROCARDIOGRAM REPORT: CPT | Performed by: INTERNAL MEDICINE

## 2020-01-01 PROCEDURE — 83605 ASSAY OF LACTIC ACID: CPT | Performed by: NURSE PRACTITIONER

## 2020-01-01 PROCEDURE — 36600 WITHDRAWAL OF ARTERIAL BLOOD: CPT

## 2020-01-01 PROCEDURE — 87086 URINE CULTURE/COLONY COUNT: CPT | Performed by: EMERGENCY MEDICINE

## 2020-01-01 PROCEDURE — 85025 COMPLETE CBC W/AUTO DIFF WBC: CPT | Performed by: NURSE PRACTITIONER

## 2020-01-01 PROCEDURE — 83605 ASSAY OF LACTIC ACID: CPT | Performed by: EMERGENCY MEDICINE

## 2020-01-01 PROCEDURE — 96365 THER/PROPH/DIAG IV INF INIT: CPT

## 2020-01-01 PROCEDURE — 93306 TTE W/DOPPLER COMPLETE: CPT

## 2020-01-01 PROCEDURE — 93005 ELECTROCARDIOGRAM TRACING: CPT | Performed by: FAMILY MEDICINE

## 2020-01-01 PROCEDURE — 93005 ELECTROCARDIOGRAM TRACING: CPT | Performed by: NURSE PRACTITIONER

## 2020-01-01 PROCEDURE — 71045 X-RAY EXAM CHEST 1 VIEW: CPT

## 2020-01-01 PROCEDURE — 92610 EVALUATE SWALLOWING FUNCTION: CPT | Performed by: SPEECH-LANGUAGE PATHOLOGIST

## 2020-01-01 PROCEDURE — 87635 SARS-COV-2 COVID-19 AMP PRB: CPT | Performed by: EMERGENCY MEDICINE

## 2020-01-01 PROCEDURE — 93005 ELECTROCARDIOGRAM TRACING: CPT

## 2020-01-01 PROCEDURE — 25010000002 ENOXAPARIN PER 10 MG: Performed by: NURSE PRACTITIONER

## 2020-01-01 PROCEDURE — 93306 TTE W/DOPPLER COMPLETE: CPT | Performed by: INTERNAL MEDICINE

## 2020-01-01 PROCEDURE — 83880 ASSAY OF NATRIURETIC PEPTIDE: CPT | Performed by: NURSE PRACTITIONER

## 2020-01-01 RX ORDER — ACETAMINOPHEN 325 MG/1
650 TABLET ORAL EVERY 6 HOURS PRN
Status: DISCONTINUED | OUTPATIENT
Start: 2020-01-01 | End: 2020-01-01 | Stop reason: HOSPADM

## 2020-01-01 RX ORDER — SODIUM CHLORIDE 0.9 % (FLUSH) 0.9 %
10 SYRINGE (ML) INJECTION AS NEEDED
Status: DISCONTINUED | OUTPATIENT
Start: 2020-01-01 | End: 2020-01-01 | Stop reason: HOSPADM

## 2020-01-01 RX ORDER — ESCITALOPRAM OXALATE 10 MG/1
20 TABLET ORAL DAILY
Status: DISCONTINUED | OUTPATIENT
Start: 2020-01-01 | End: 2020-01-01 | Stop reason: HOSPADM

## 2020-01-01 RX ORDER — SODIUM CHLORIDE 0.9 % (FLUSH) 0.9 %
10 SYRINGE (ML) INJECTION EVERY 12 HOURS SCHEDULED
Status: DISCONTINUED | OUTPATIENT
Start: 2020-01-01 | End: 2020-01-01 | Stop reason: HOSPADM

## 2020-01-01 RX ORDER — METOPROLOL SUCCINATE 25 MG/1
25 TABLET, EXTENDED RELEASE ORAL DAILY
Status: DISCONTINUED | OUTPATIENT
Start: 2020-01-01 | End: 2020-01-01 | Stop reason: HOSPADM

## 2020-01-01 RX ORDER — CEFDINIR 300 MG/1
300 CAPSULE ORAL EVERY 12 HOURS SCHEDULED
Qty: 14 CAPSULE | Refills: 0 | Status: SHIPPED | OUTPATIENT
Start: 2020-01-01 | End: 2020-01-01

## 2020-01-01 RX ORDER — MEMANTINE HYDROCHLORIDE 5 MG/1
10 TABLET ORAL EVERY 12 HOURS SCHEDULED
Status: DISCONTINUED | OUTPATIENT
Start: 2020-01-01 | End: 2020-01-01 | Stop reason: HOSPADM

## 2020-01-01 RX ORDER — POTASSIUM CHLORIDE 750 MG/1
40 CAPSULE, EXTENDED RELEASE ORAL AS NEEDED
Status: DISCONTINUED | OUTPATIENT
Start: 2020-01-01 | End: 2020-01-01 | Stop reason: HOSPADM

## 2020-01-01 RX ORDER — BENAZEPRIL HYDROCHLORIDE 40 MG/1
40 TABLET, FILM COATED ORAL DAILY
COMMUNITY
End: 2021-01-01 | Stop reason: HOSPADM

## 2020-01-01 RX ORDER — SODIUM CHLORIDE 9 MG/ML
75 INJECTION, SOLUTION INTRAVENOUS CONTINUOUS
Status: DISCONTINUED | OUTPATIENT
Start: 2020-01-01 | End: 2020-01-01 | Stop reason: HOSPADM

## 2020-01-01 RX ORDER — FUROSEMIDE 20 MG/1
20 TABLET ORAL DAILY
Status: DISCONTINUED | OUTPATIENT
Start: 2020-01-01 | End: 2020-01-01 | Stop reason: HOSPADM

## 2020-01-01 RX ORDER — SODIUM CHLORIDE 9 MG/ML
100 INJECTION, SOLUTION INTRAVENOUS CONTINUOUS
Status: DISCONTINUED | OUTPATIENT
Start: 2020-01-01 | End: 2020-01-01 | Stop reason: HOSPADM

## 2020-01-01 RX ORDER — AMLODIPINE BESYLATE 5 MG/1
5 TABLET ORAL
Status: DISCONTINUED | OUTPATIENT
Start: 2020-01-01 | End: 2020-01-01 | Stop reason: HOSPADM

## 2020-01-01 RX ORDER — TRAZODONE HYDROCHLORIDE 50 MG/1
50 TABLET ORAL NIGHTLY
Qty: 90 TABLET | Refills: 0 | Status: ON HOLD | OUTPATIENT
Start: 2020-01-01 | End: 2020-01-01

## 2020-01-01 RX ORDER — POTASSIUM CHLORIDE 1.5 G/1.77G
40 POWDER, FOR SOLUTION ORAL AS NEEDED
Status: DISCONTINUED | OUTPATIENT
Start: 2020-01-01 | End: 2020-01-01 | Stop reason: HOSPADM

## 2020-01-01 RX ORDER — ACETAMINOPHEN,DIPHENHYDRAMINE HCL 500; 25 MG/1; MG/1
1 TABLET, FILM COATED ORAL NIGHTLY PRN
COMMUNITY

## 2020-01-01 RX ORDER — TRAZODONE HYDROCHLORIDE 50 MG/1
50 TABLET ORAL NIGHTLY
Status: DISCONTINUED | OUTPATIENT
Start: 2020-01-01 | End: 2020-01-01 | Stop reason: HOSPADM

## 2020-01-01 RX ORDER — LISINOPRIL 20 MG/1
20 TABLET ORAL
Status: DISCONTINUED | OUTPATIENT
Start: 2020-01-01 | End: 2020-01-01 | Stop reason: HOSPADM

## 2020-01-01 RX ORDER — CEFDINIR 300 MG/1
300 CAPSULE ORAL EVERY 12 HOURS SCHEDULED
Status: DISCONTINUED | OUTPATIENT
Start: 2020-01-01 | End: 2020-01-01 | Stop reason: HOSPADM

## 2020-01-01 RX ORDER — ALLOPURINOL 100 MG/1
100 TABLET ORAL DAILY
COMMUNITY
End: 2020-01-01 | Stop reason: HOSPADM

## 2020-01-01 RX ORDER — FUROSEMIDE 20 MG/1
20 TABLET ORAL DAILY
Qty: 30 TABLET | Refills: 3 | Status: SHIPPED | OUTPATIENT
Start: 2020-01-01 | End: 2021-01-01 | Stop reason: HOSPADM

## 2020-01-01 RX ORDER — METOPROLOL SUCCINATE 50 MG/1
50 TABLET, EXTENDED RELEASE ORAL DAILY
Status: DISCONTINUED | OUTPATIENT
Start: 2020-01-01 | End: 2020-01-01

## 2020-01-01 RX ORDER — ASPIRIN 81 MG/1
81 TABLET ORAL DAILY
COMMUNITY

## 2020-01-01 RX ORDER — HYDROCHLOROTHIAZIDE 12.5 MG/1
12.5 TABLET ORAL DAILY
COMMUNITY

## 2020-01-01 RX ORDER — METOPROLOL SUCCINATE 25 MG/1
25 TABLET, EXTENDED RELEASE ORAL DAILY
Qty: 30 TABLET | Refills: 3 | Status: SHIPPED | OUTPATIENT
Start: 2020-01-01

## 2020-01-01 RX ADMIN — SODIUM CHLORIDE 100 ML/HR: 9 INJECTION, SOLUTION INTRAVENOUS at 01:28

## 2020-01-01 RX ADMIN — PERFLUTREN 8.48 MG: 6.52 INJECTION, SUSPENSION INTRAVENOUS at 09:06

## 2020-01-01 RX ADMIN — RIVAROXABAN 15 MG: 15 TABLET, FILM COATED ORAL at 20:20

## 2020-01-01 RX ADMIN — TRAZODONE HYDROCHLORIDE 50 MG: 50 TABLET ORAL at 20:20

## 2020-01-01 RX ADMIN — RIVAROXABAN 20 MG: 20 TABLET, FILM COATED ORAL at 17:29

## 2020-01-01 RX ADMIN — MEMANTINE HYDROCHLORIDE 10 MG: 5 TABLET, FILM COATED ORAL at 20:20

## 2020-01-01 RX ADMIN — LISINOPRIL 20 MG: 20 TABLET ORAL at 04:38

## 2020-01-01 RX ADMIN — RIVAROXABAN 20 MG: 20 TABLET, FILM COATED ORAL at 19:14

## 2020-01-01 RX ADMIN — SODIUM CHLORIDE, PRESERVATIVE FREE 10 ML: 5 INJECTION INTRAVENOUS at 20:13

## 2020-01-01 RX ADMIN — SODIUM CHLORIDE 75 ML/HR: 9 INJECTION, SOLUTION INTRAVENOUS at 02:04

## 2020-01-01 RX ADMIN — ACETAMINOPHEN 650 MG: 325 TABLET, FILM COATED ORAL at 17:50

## 2020-01-01 RX ADMIN — FUROSEMIDE 20 MG: 20 TABLET ORAL at 10:28

## 2020-01-01 RX ADMIN — LISINOPRIL 20 MG: 20 TABLET ORAL at 09:47

## 2020-01-01 RX ADMIN — FUROSEMIDE 20 MG: 20 TABLET ORAL at 09:06

## 2020-01-01 RX ADMIN — MEMANTINE HYDROCHLORIDE 10 MG: 5 TABLET, FILM COATED ORAL at 14:50

## 2020-01-01 RX ADMIN — SODIUM CHLORIDE 250 ML: 9 INJECTION, SOLUTION INTRAVENOUS at 18:39

## 2020-01-01 RX ADMIN — MEMANTINE HYDROCHLORIDE 10 MG: 5 TABLET, FILM COATED ORAL at 09:06

## 2020-01-01 RX ADMIN — ESCITALOPRAM 20 MG: 10 TABLET, FILM COATED ORAL at 08:58

## 2020-01-01 RX ADMIN — CEFTRIAXONE SODIUM 1 G: 1 INJECTION, POWDER, FOR SOLUTION INTRAMUSCULAR; INTRAVENOUS at 20:30

## 2020-01-01 RX ADMIN — CEFTRIAXONE SODIUM 1 G: 1 INJECTION, POWDER, FOR SOLUTION INTRAMUSCULAR; INTRAVENOUS at 22:05

## 2020-01-01 RX ADMIN — POTASSIUM CHLORIDE 40 MEQ: 750 CAPSULE, EXTENDED RELEASE ORAL at 15:13

## 2020-01-01 RX ADMIN — RIVAROXABAN 15 MG: 15 TABLET, FILM COATED ORAL at 19:00

## 2020-01-01 RX ADMIN — ESCITALOPRAM 20 MG: 10 TABLET, FILM COATED ORAL at 10:29

## 2020-01-01 RX ADMIN — SODIUM CHLORIDE, PRESERVATIVE FREE 10 ML: 5 INJECTION INTRAVENOUS at 09:46

## 2020-01-01 RX ADMIN — MEMANTINE HYDROCHLORIDE 10 MG: 5 TABLET, FILM COATED ORAL at 20:30

## 2020-01-01 RX ADMIN — ESCITALOPRAM 20 MG: 10 TABLET, FILM COATED ORAL at 09:06

## 2020-01-01 RX ADMIN — AMLODIPINE BESYLATE 5 MG: 5 TABLET ORAL at 09:47

## 2020-01-01 RX ADMIN — ESCITALOPRAM 20 MG: 10 TABLET, FILM COATED ORAL at 09:47

## 2020-01-01 RX ADMIN — CEFDINIR 300 MG: 300 CAPSULE ORAL at 11:09

## 2020-01-01 RX ADMIN — SODIUM CHLORIDE, PRESERVATIVE FREE 10 ML: 5 INJECTION INTRAVENOUS at 14:54

## 2020-01-01 RX ADMIN — METOPROLOL SUCCINATE 25 MG: 25 TABLET, EXTENDED RELEASE ORAL at 09:06

## 2020-01-01 RX ADMIN — MEMANTINE HYDROCHLORIDE 10 MG: 5 TABLET, FILM COATED ORAL at 22:05

## 2020-01-01 RX ADMIN — ACETAMINOPHEN 500 MG: 500 TABLET, FILM COATED ORAL at 00:14

## 2020-01-01 RX ADMIN — METOPROLOL SUCCINATE 50 MG: 50 TABLET, FILM COATED, EXTENDED RELEASE ORAL at 09:47

## 2020-01-01 RX ADMIN — MEMANTINE HYDROCHLORIDE 10 MG: 5 TABLET, FILM COATED ORAL at 09:47

## 2020-01-01 RX ADMIN — SODIUM CHLORIDE 75 ML/HR: 9 INJECTION, SOLUTION INTRAVENOUS at 06:30

## 2020-01-01 RX ADMIN — SODIUM CHLORIDE, PRESERVATIVE FREE 10 ML: 5 INJECTION INTRAVENOUS at 20:30

## 2020-01-01 RX ADMIN — IOPAMIDOL 100 ML: 755 INJECTION, SOLUTION INTRAVENOUS at 22:00

## 2020-01-01 RX ADMIN — AMLODIPINE BESYLATE 5 MG: 5 TABLET ORAL at 04:38

## 2020-01-01 RX ADMIN — SODIUM CHLORIDE 100 ML/HR: 9 INJECTION, SOLUTION INTRAVENOUS at 09:59

## 2020-01-01 RX ADMIN — MEMANTINE HYDROCHLORIDE 10 MG: 5 TABLET, FILM COATED ORAL at 08:58

## 2020-01-01 RX ADMIN — SODIUM CHLORIDE, PRESERVATIVE FREE 10 ML: 5 INJECTION INTRAVENOUS at 09:07

## 2020-01-01 RX ADMIN — FUROSEMIDE 20 MG: 20 TABLET ORAL at 14:54

## 2020-01-01 RX ADMIN — MEMANTINE HYDROCHLORIDE 10 MG: 5 TABLET, FILM COATED ORAL at 20:12

## 2020-01-01 RX ADMIN — RIVAROXABAN 15 MG: 15 TABLET, FILM COATED ORAL at 09:47

## 2020-01-01 RX ADMIN — FUROSEMIDE 20 MG: 20 TABLET ORAL at 09:47

## 2020-01-01 RX ADMIN — ENOXAPARIN SODIUM 80 MG: 80 INJECTION SUBCUTANEOUS at 23:27

## 2020-01-01 RX ADMIN — SODIUM CHLORIDE 75 ML/HR: 9 INJECTION, SOLUTION INTRAVENOUS at 17:28

## 2020-01-01 RX ADMIN — FUROSEMIDE 20 MG: 20 TABLET ORAL at 08:58

## 2020-01-01 RX ADMIN — POTASSIUM CHLORIDE 40 MEQ: 750 CAPSULE, EXTENDED RELEASE ORAL at 19:53

## 2020-01-01 RX ADMIN — MEMANTINE HYDROCHLORIDE 10 MG: 5 TABLET, FILM COATED ORAL at 10:29

## 2020-01-01 RX ADMIN — CEFTRIAXONE SODIUM 1 G: 1 INJECTION, POWDER, FOR SOLUTION INTRAMUSCULAR; INTRAVENOUS at 22:10

## 2020-01-01 RX ADMIN — SODIUM CHLORIDE, PRESERVATIVE FREE 10 ML: 5 INJECTION INTRAVENOUS at 22:05

## 2020-01-01 RX ADMIN — TRAZODONE HYDROCHLORIDE 50 MG: 50 TABLET ORAL at 20:12

## 2020-01-01 RX ADMIN — RIVAROXABAN 15 MG: 15 TABLET, FILM COATED ORAL at 09:05

## 2020-01-01 RX ADMIN — ESCITALOPRAM 20 MG: 10 TABLET, FILM COATED ORAL at 14:50

## 2020-01-06 ENCOUNTER — NURSING HOME (OUTPATIENT)
Dept: INTERNAL MEDICINE | Facility: CLINIC | Age: 85
End: 2020-01-06
Payer: MEDICARE

## 2020-01-06 DIAGNOSIS — I10 ESSENTIAL HYPERTENSION: Primary | ICD-10-CM

## 2020-01-06 DIAGNOSIS — F03.90 DEMENTIA WITHOUT BEHAVIORAL DISTURBANCE, UNSPECIFIED DEMENTIA TYPE: ICD-10-CM

## 2020-01-09 RX ORDER — TRAZODONE HYDROCHLORIDE 50 MG/1
50 TABLET ORAL NIGHTLY
Qty: 30 TABLET | Refills: 3 | Status: SHIPPED | OUTPATIENT
Start: 2020-01-09 | End: 2020-01-24 | Stop reason: SDUPTHER

## 2020-01-09 RX ORDER — BUSPIRONE HYDROCHLORIDE 5 MG/1
5 TABLET ORAL 3 TIMES DAILY
Qty: 90 TABLET | Refills: 3 | Status: SHIPPED | OUTPATIENT
Start: 2020-01-09 | End: 2020-07-09 | Stop reason: SDUPTHER

## 2020-01-09 RX ORDER — ERGOCALCIFEROL 1.25 MG/1
50000 CAPSULE ORAL 2 TIMES WEEKLY
Qty: 8 CAPSULE | Refills: 11 | Status: SHIPPED | OUTPATIENT
Start: 2020-01-09 | End: 2021-01-01

## 2020-01-09 RX ORDER — FUROSEMIDE 20 MG/1
20 TABLET ORAL DAILY
Qty: 30 TABLET | Refills: 3 | Status: ON HOLD | OUTPATIENT
Start: 2020-01-09 | End: 2020-01-01

## 2020-01-09 RX ORDER — AMLODIPINE BESYLATE AND BENAZEPRIL HYDROCHLORIDE 5; 20 MG/1; MG/1
1 CAPSULE ORAL DAILY
Qty: 30 CAPSULE | Refills: 3 | Status: ON HOLD | OUTPATIENT
Start: 2020-01-09 | End: 2020-01-01

## 2020-01-09 RX ORDER — TRAZODONE HYDROCHLORIDE 50 MG/1
50 TABLET ORAL NIGHTLY
COMMUNITY
Start: 2019-12-29 | End: 2020-01-09 | Stop reason: SDUPTHER

## 2020-01-09 RX ORDER — BUSPIRONE HYDROCHLORIDE 15 MG/1
15 TABLET ORAL 3 TIMES DAILY
Status: ON HOLD | COMMUNITY
Start: 2019-12-25 | End: 2020-01-01

## 2020-01-09 RX ORDER — MEMANTINE HYDROCHLORIDE 7 MG/1
28 CAPSULE, EXTENDED RELEASE ORAL DAILY
Qty: 30 CAPSULE | Refills: 3 | Status: SHIPPED | OUTPATIENT
Start: 2020-01-09 | End: 2020-01-13 | Stop reason: SDUPTHER

## 2020-01-09 RX ORDER — MEMANTINE HYDROCHLORIDE 28 MG/1
28 CAPSULE, EXTENDED RELEASE ORAL DAILY
Refills: 3 | COMMUNITY
Start: 2019-12-10 | End: 2020-01-13 | Stop reason: SDUPTHER

## 2020-01-09 RX ORDER — ESCITALOPRAM OXALATE 20 MG/1
20 TABLET ORAL DAILY
Qty: 30 TABLET | Refills: 3 | Status: SHIPPED | OUTPATIENT
Start: 2020-01-09

## 2020-01-09 RX ORDER — METOPROLOL SUCCINATE 50 MG/1
50 TABLET, EXTENDED RELEASE ORAL DAILY
Qty: 30 TABLET | Refills: 3 | Status: SHIPPED | OUTPATIENT
Start: 2020-01-09 | End: 2020-01-01 | Stop reason: HOSPADM

## 2020-01-09 NOTE — TELEPHONE ENCOUNTER
URGENT  pts daughter, Cecile stopped by needing refills for her mom's meds although Dr. Vargas didn't orginaly prescribe. She will no longer be going back to her previous provider.  Call into [facility] in [place].    Please call ALL of the following in for a 90 day Qty.    Pt is Nearly Out of some & completely out in others.     VITAMIN D2 1.25MB (50    busPIRone (BUSPAR) 5 MG tablet [9324] (Order [id number]) Take 5 mg by mouth 3 (Three) Times a Day    amLODIPine-benazepril (LOTREL 5-20) 5-20 MG per capsule [[id number]] (Order [id number]) Take 1 capsule by mouth Daily.    memantine (NAMENDA XR) 7 MG capsule sustained-release 24 hr extended release capsule [[id number]] (Order [id number])Take 28 mg by mouth Daily.    furosemide (LASIX) 20 MG tablet [[id number]] (Order [id number]) Take 20 mg by mouth Daily    Trazodone 50MG - 1 tablet by mouth in the evening.     ESCITALOPRAM 20MG - Take 1 tablet by mouth every day.    METOPROLOL 50MG - SUCC ER 50mg TAB - Take 1 tablet by mouth every day.

## 2020-01-10 DIAGNOSIS — F03.90 DEMENTIA WITHOUT BEHAVIORAL DISTURBANCE, UNSPECIFIED DEMENTIA TYPE: Primary | ICD-10-CM

## 2020-01-10 NOTE — TELEPHONE ENCOUNTER
Pt's daughter, Ra called today stating we filled the wrong dose for memantine (NAMENDA XR) 7 MG capsule sustained-release 24 hr extended release capsule [[id number]] (Order [id number]).    THIS IS JOHNNIE'S FAULT AS I COPIED WHAT MEDS WERE IN HER HISTORY VS LOOKING AT THE BOTTLE SHE BROUGHT INTO THE OFFICE & GAVE ME.      IF YOU WOULD, CHANGE TO 28MG AND 1X A DAY PLEASE.    RA SAID IT'S EASIER FOR VON BECK TO ADMINISTER 1 X A DAY VS 4.    SHE NOW USES [facility].  DO NOT SEND TO [facility] PLEASE.

## 2020-01-13 RX ORDER — MEMANTINE HYDROCHLORIDE 28 MG/1
28 CAPSULE, EXTENDED RELEASE ORAL DAILY
Qty: 30 CAPSULE | Refills: 3 | Status: SHIPPED | OUTPATIENT
Start: 2020-01-13

## 2020-01-13 NOTE — PROGRESS NOTES
The ABCs of the Annual Wellness Visit  Initial Medicare Wellness Visit    No complaint, Medicare Wellness Visit    Subjective   History of Present Illness:  Nichole Mcgregor is a 86 y.o. female who presents for an Initial Medicare Wellness Visit.    HEALTH RISK ASSESSMENT    Recent Hospitalizations:  None    Current Medical Providers:  Dmitriy Vargas DO    Smoking Status:  Social History     Tobacco Use   Smoking Status Never Smoker   Smokeless Tobacco Never Used       Alcohol Consumption:  Social History     Substance and Sexual Activity   Alcohol Use Never   • Frequency: Never       Depression Screen:   No flowsheet data found.    Fall Risk Screen:  STEADI Fall Risk Assessment has not been completed.    Health Habits and Functional and Cognitive Screening:  No flowsheet data found.      Does the patient have evidence of cognitive impairment?   Yes    Asprin use counseling: Yes    Age-appropriate Screening Schedule:  Refer to the list below for future screening recommendations based on patient's age, sex and/or medical conditions. Orders for these recommended tests are listed in the plan section. The patient has been provided with a written plan.    Health Maintenance   Topic Date Due   • URINE MICROALBUMIN  03/31/1933   • TDAP/TD VACCINES (1 - Tdap) 03/31/1944   • ZOSTER VACCINE (1 of 2) 03/31/1983   • HEMOGLOBIN A1C  12/16/2019   • DIABETIC EYE EXAM  12/16/2019   • INFLUENZA VACCINE  Completed          The following portions of the patient's history were reviewed and updated as appropriate:   Past Medical History:   Medical History        Past Medical History:   Diagnosis Date   • Dementia (CMS/HCC)     • Hypertension           Past Surgical History:  Surgical History         Past Surgical History:   Procedure Laterality Date   • HYSTERECTOMY       • KNEE MENISCAL REPAIR             Social History:  reports that she has never smoked. She has never used smokeless tobacco. She reports that she does not drink alcohol  or use drugs.     Family History: Heart disease     Allergies:  No Known Allergies  Medications:    All reviewed by physician.     Outpatient Medications Prior to Visit   Medication Sig Dispense Refill   • azithromycin (ZITHROMAX Z-MIRLANDE) 250 MG tablet Take 2 tablets the first day, then 1 tablet daily for 4 days. 6 tablet 0   • benzonatate (TESSALON PERLES) 100 MG capsule Take 2 capsules by mouth 3 (Three) Times a Day As Needed for Cough. 60 capsule 0   • cefdinir (OMNICEF) 300 MG capsule Take 1 capsule by mouth 2 (Two) Times a Day. 20 capsule 0   • fexofenadine (ALLEGRA ALLERGY) 180 MG tablet Take 1 tablet by mouth Daily. 14 tablet 0     No facility-administered medications prior to visit.        Dementia  Hypertension    Advanced Care Planning:  In place.     Review of Systems    Compared to one year ago, the patient feels her physical health is the same  Compared to one year ago, the patient feels her mental health is the same.    Reviewed chart for potential of high risk medication in the elderly: no  Reviewed chart for potential of harmful drug interactions in the elderly:no    Objective       There were no vitals filed for this visit.    There is no height or weight on file to calculate BMI.  Discussed the patient's BMI with her. The BMI is above average; no BMI management plan is appropriate..    Physical Exam   Constitutional: She appears well-developed and well-nourished.   HENT:   Head: Normocephalic and atraumatic.   Nose: Nose normal.   Mouth/Throat: Oropharynx is clear and moist.   Eyes: Conjunctivae and EOM are normal.   Neck: Normal range of motion. Neck supple.   Cardiovascular: Normal rate, regular rhythm and normal heart sounds.   Pulmonary/Chest: Effort normal and breath sounds normal.   Abdominal: Soft. Bowel sounds are normal.   Musculoskeletal: She exhibits no edema or tenderness.   Neurological: She is alert. No cranial nerve deficit.   Skin: Skin is warm and dry.   Psychiatric: She has a normal  mood and affect.   Vitals reviewed.            Assessment/Plan   Medicare Risks and Personalized Health Plan  CMS Preventative Services Quick Reference  Obesity/Overweight     The above risks/problems have been discussed with the patient.  Pertinent information has been shared with the patient in the After Visit Summary.  Follow up plans and orders are seen below in the Assessment/Plan Section.    Diagnoses and all orders for this visit:    1. Essential hypertension (Primary)    2. Dementia without behavioral disturbance, unspecified dementia type (CMS/[facility])      Follow Up:  Return in about 3 months (around 4/6/2020) for Next scheduled follow up.     An After Visit Summary and PPPS were given to the patient.

## 2020-01-14 ENCOUNTER — OFFICE VISIT (OUTPATIENT)
Dept: INTERNAL MEDICINE | Facility: CLINIC | Age: 85
End: 2020-01-14

## 2020-01-14 ENCOUNTER — TELEPHONE (OUTPATIENT)
Dept: INTERNAL MEDICINE | Facility: CLINIC | Age: 85
End: 2020-01-14

## 2020-01-14 VITALS
DIASTOLIC BLOOD PRESSURE: 80 MMHG | WEIGHT: 203 LBS | OXYGEN SATURATION: 97 % | BODY MASS INDEX: 33.82 KG/M2 | TEMPERATURE: 98 F | HEART RATE: 64 BPM | HEIGHT: 65 IN | SYSTOLIC BLOOD PRESSURE: 176 MMHG

## 2020-01-14 DIAGNOSIS — J06.9 VIRAL UPPER RESPIRATORY TRACT INFECTION: ICD-10-CM

## 2020-01-14 DIAGNOSIS — T78.40XD ALLERGIC STATE, SUBSEQUENT ENCOUNTER: ICD-10-CM

## 2020-01-14 DIAGNOSIS — R05.9 COUGH: Primary | ICD-10-CM

## 2020-01-14 DIAGNOSIS — I50.9 CHRONIC CONGESTIVE HEART FAILURE, UNSPECIFIED HEART FAILURE TYPE (HCC): ICD-10-CM

## 2020-01-14 DIAGNOSIS — J40 BRONCHITIS: ICD-10-CM

## 2020-01-14 PROCEDURE — 99213 OFFICE O/P EST LOW 20 MIN: CPT | Performed by: INTERNAL MEDICINE

## 2020-01-14 RX ORDER — BENZONATATE 100 MG/1
100 CAPSULE ORAL 3 TIMES DAILY PRN
Qty: 30 CAPSULE | Refills: 1 | Status: ON HOLD | OUTPATIENT
Start: 2020-01-14 | End: 2020-01-01

## 2020-01-14 RX ORDER — FEXOFENADINE HCL 180 MG/1
180 TABLET ORAL DAILY
Qty: 30 TABLET | Refills: 5 | Status: ON HOLD | OUTPATIENT
Start: 2020-01-14 | End: 2020-01-01

## 2020-01-14 RX ORDER — DOXYCYCLINE 100 MG/1
100 TABLET ORAL 2 TIMES DAILY
Qty: 20 TABLET | Refills: 0 | OUTPATIENT
Start: 2020-01-14 | End: 2020-09-11

## 2020-01-14 NOTE — TELEPHONE ENCOUNTER
Pt's daughter Cecile called, stating her mother is coughing bad and croupy.   She asked if Dr. Vargas could go see her today.  I discussed she only goes on Monday's although I would send a message to determine how she wants to treat.    FYI - She noted. The Omnicef previously prescribed gave her diarrhea & Made her nauseous

## 2020-01-14 NOTE — PROGRESS NOTES
Subjective     Chief Complaint   Patient presents with   • Cough       History of Present Illness  Patient states that she has a tickle in her throat. Was recently treated for bronchitis. Mild yellow production, patient states that it is clear.   Patient states that she doesn't feel like she chokes on food or has to clear her throat. Patient states that her edema is chronic and doesn't seem worse.     Patient's PMR from outside medical facility reviewed and noted.    Review of Systems   Constitutional: Negative for chills and fever.   HENT: Positive for congestion and rhinorrhea.    Eyes: Negative for discharge and redness.   Respiratory: Positive for cough. Negative for shortness of breath.    Cardiovascular: Positive for leg swelling. Negative for chest pain.   Gastrointestinal: Negative for diarrhea, nausea and vomiting.   Genitourinary: Negative for dysuria and hematuria.   Skin: Negative for color change and wound.   Neurological: Negative for dizziness and headaches.   Psychiatric/Behavioral:        Memory difficulty        Otherwise complete ROS reviewed and negative except as mentioned in the HPI.    Past Medical History:   Past Medical History:   Diagnosis Date   • Dementia (CMS/HCC)    • Hypertension      Past Surgical History:  Past Surgical History:   Procedure Laterality Date   • HYSTERECTOMY     • KNEE MENISCAL REPAIR       Social History:  reports that she has never smoked. She has never used smokeless tobacco. She reports that she does not drink alcohol or use drugs.    Family History: None    Allergies:  No Known Allergies  Medications:  Prior to Admission medications    Medication Sig Start Date End Date Taking? Authorizing Provider   amLODIPine-benazepril (LOTREL 5-20) 5-20 MG per capsule Take 1 capsule by mouth Daily. 1/9/20  Yes Dmitriy Vargas,    benzonatate (TESSALON PERLES) 100 MG capsule Take 2 capsules by mouth 3 (Three) Times a Day As Needed for Cough. 12/16/19  Yes Sam  "Ali Ange, DO   busPIRone (BUSPAR) 15 MG tablet Take 15 mg by mouth 3 (Three) Times a Day. 12/25/19  Yes Provider, MD Delilah   busPIRone (BUSPAR) 5 MG tablet Take 1 tablet by mouth 3 (Three) Times a Day. 1/9/20  Yes Dmitriy Vargas DO   ergocalciferol (ERGOCALCIFEROL) 1.25 MG (88225 UT) capsule Take 1 capsule by mouth 2 (Two) Times a Week. 1/9/20 1/8/21 Yes Dmitriy Vargas DO   escitalopram (LEXAPRO) 20 MG tablet Take 1 tablet by mouth Daily. 1/9/20  Yes Dmitriy Vargas DO   fexofenadine (ALLEGRA ALLERGY) 180 MG tablet Take 1 tablet by mouth Daily. 12/20/19  Yes Bharti Nolan APRN   furosemide (LASIX) 20 MG tablet Take 1 tablet by mouth Daily. 1/9/20  Yes Dmitriy Vargas DO   memantine (NAMENDA XR) 28 MG capsule sustained-release 24 hr extended release capsule Take 1 capsule by mouth Daily. 1/13/20  Yes Dmitriy Vargas DO   metoprolol succinate XL (TOPROL-XL) 50 MG 24 hr tablet Take 1 tablet by mouth Daily. 1/9/20  Yes Dmitriy Vargas DO   traZODone (DESYREL) 50 MG tablet Take 1 tablet by mouth Every Night. 1/9/20  Yes Dmitriy Vargas DO   azithromycin (ZITHROMAX Z-MIRLANDE) 250 MG tablet Take 2 tablets the first day, then 1 tablet daily for 4 days. 12/16/19   Dmitriy Vargas DO   cefdinir (OMNICEF) 300 MG capsule Take 1 capsule by mouth 2 (Two) Times a Day. 12/20/19   Bharti Nolan APRN       Objective     Vital Signs: /80 (BP Location: Left arm, Patient Position: Sitting, Cuff Size: Adult)   Pulse 64   Temp 98 °F (36.7 °C) (Temporal)   Ht 165.1 cm (65\")   Wt 92.1 kg (203 lb)   SpO2 97%   Breastfeeding No   BMI 33.78 kg/m²   Physical Exam   Constitutional: She appears well-developed and well-nourished.   HENT:   Head: Normocephalic and atraumatic.   Nose: Nose normal.   Mouth/Throat: Oropharynx is clear and moist.   Eyes: Conjunctivae and EOM are normal.   Neck: Normal range of motion. Neck supple.   Cardiovascular: Normal rate, regular rhythm and " normal heart sounds.   Pulmonary/Chest: Effort normal and breath sounds normal.   Abdominal: Soft. Bowel sounds are normal.   Musculoskeletal: She exhibits edema. She exhibits no tenderness.   Bilateral LE edema.    Neurological: She is alert. No cranial nerve deficit.   Skin: Skin is warm and dry.   Psychiatric: She has a normal mood and affect.   Memory impairment. Patient can become combative about taking medications and her diagnosis.   Vitals reviewed.      Patient's Body mass index is 33.78 kg/m². BMI is above normal parameters. Recommendations include: nutrition counseling.      Results Reviewed:  No results found for: GLUCOSE, BUN, CREATININE, NA, K, CL, CO2, CALCIUM, ALT, AST, WBC, HCT, PLT, CHOL, TRIG, HDL, LDL, LDLHDL, HGBA1C      Assessment / Plan     Assessment/Plan:  1. Cough  - XR Chest PA & Lateral (In Office)      2. Bronchitis  - Doxycycline  - Tessalon      3. Allergy  - Allegra refill      May need to consider changing benazepril. Did not at this visit because of yellow sputum production. May need to consider aspiration, but patient did not complain of Sx today but she has memory impairment.         Return in about 2 weeks (around 1/28/2020) for Next scheduled follow up. unless patient needs to be seen sooner or acute issues arise.    Code Status: Full    I have discussed the patient results/orders and and plan/recommendation with them at today's visit.      Dmitriy Vargas, DO   01/14/2020

## 2020-01-14 NOTE — TELEPHONE ENCOUNTER
Dr. Vargas states that she would like patient to come in and been seen for evaluation and possible chest xray. I notified the patients daughter. Appointment made with Dr. Vargas this afternoon.

## 2020-01-22 ENCOUNTER — TELEPHONE (OUTPATIENT)
Dept: INTERNAL MEDICINE | Facility: CLINIC | Age: 85
End: 2020-01-22

## 2020-01-24 DIAGNOSIS — F03.90 DEMENTIA WITHOUT BEHAVIORAL DISTURBANCE, UNSPECIFIED DEMENTIA TYPE: Primary | ICD-10-CM

## 2020-01-24 NOTE — TELEPHONE ENCOUNTER
Request for 90 day supply of trazodone. Refilled on 01/09/2020 for a 30 day supply. Please advise if you would like to add refills.

## 2020-01-27 RX ORDER — TRAZODONE HYDROCHLORIDE 50 MG/1
50 TABLET ORAL NIGHTLY
Qty: 90 TABLET | Refills: 0 | Status: ON HOLD | OUTPATIENT
Start: 2020-01-27 | End: 2020-01-01

## 2020-03-05 ENCOUNTER — TELEPHONE (OUTPATIENT)
Dept: INTERNAL MEDICINE | Facility: CLINIC | Age: 85
End: 2020-03-05

## 2020-03-05 NOTE — TELEPHONE ENCOUNTER
"Home health called to inform our office that patient is in stable condition. She states that patient \"doesn't feel she needs home health services\". She states that they are discharging patient from home health today.   "

## 2020-07-10 ENCOUNTER — LAB (OUTPATIENT)
Dept: LAB | Facility: HOSPITAL | Age: 85
End: 2020-07-10

## 2020-07-10 ENCOUNTER — TRANSCRIBE ORDERS (OUTPATIENT)
Dept: ADMINISTRATIVE | Facility: HOSPITAL | Age: 85
End: 2020-07-10

## 2020-07-10 DIAGNOSIS — R60.0 LOCALIZED EDEMA: ICD-10-CM

## 2020-07-10 DIAGNOSIS — N39.0 URINARY TRACT INFECTION WITHOUT HEMATURIA, SITE UNSPECIFIED: Primary | ICD-10-CM

## 2020-07-10 LAB
ALBUMIN SERPL-MCNC: 3.6 G/DL (ref 3.5–5)
ALBUMIN/GLOB SERPL: 1.3 G/DL (ref 1.1–2.5)
ALP SERPL-CCNC: 89 U/L (ref 24–120)
ALT SERPL W P-5'-P-CCNC: 11 U/L (ref 0–35)
ANION GAP SERPL CALCULATED.3IONS-SCNC: 5 MMOL/L (ref 4–13)
AST SERPL-CCNC: 21 U/L (ref 7–45)
AUTO MIXED CELLS #: 0.6 10*3/MM3 (ref 0.1–2.6)
AUTO MIXED CELLS %: 7.9 % (ref 0.1–24)
BILIRUB SERPL-MCNC: 0.2 MG/DL (ref 0.1–1)
BUN SERPL-MCNC: 16 MG/DL (ref 5–21)
BUN/CREAT SERPL: 16.5
CALCIUM SPEC-SCNC: 9.9 MG/DL (ref 8.4–10.4)
CHLORIDE SERPL-SCNC: 104 MMOL/L (ref 98–110)
CO2 SERPL-SCNC: 31 MMOL/L (ref 24–31)
CREAT SERPL-MCNC: 0.97 MG/DL (ref 0.5–1.4)
ERYTHROCYTE [DISTWIDTH] IN BLOOD BY AUTOMATED COUNT: 12.8 % (ref 12.3–15.4)
GFR SERPL CREATININE-BSD FRML MDRD: 54 ML/MIN/1.73
GLOBULIN UR ELPH-MCNC: 2.8 GM/DL
GLUCOSE SERPL-MCNC: 94 MG/DL (ref 70–100)
HCT VFR BLD AUTO: 42 % (ref 34–46.6)
HGB BLD-MCNC: 14.2 G/DL (ref 12–15.9)
LYMPHOCYTES # BLD AUTO: 2.2 10*3/MM3 (ref 0.7–3.1)
LYMPHOCYTES NFR BLD AUTO: 30.4 % (ref 19.6–45.3)
MCH RBC QN AUTO: 30 PG (ref 26.6–33)
MCHC RBC AUTO-ENTMCNC: 33.8 G/DL (ref 31.5–35.7)
MCV RBC AUTO: 88.8 FL (ref 79–97)
NEUTROPHILS NFR BLD AUTO: 4.5 10*3/MM3 (ref 1.7–7)
NEUTROPHILS NFR BLD AUTO: 61.7 % (ref 42.7–76)
PLATELET # BLD AUTO: 233 10*3/MM3 (ref 140–450)
PMV BLD AUTO: 9.6 FL (ref 6–12)
POTASSIUM SERPL-SCNC: 4.3 MMOL/L (ref 3.5–5.3)
PROT SERPL-MCNC: 6.4 G/DL (ref 6.3–8.7)
RBC # BLD AUTO: 4.73 10*6/MM3 (ref 3.77–5.28)
SODIUM SERPL-SCNC: 140 MMOL/L (ref 135–145)
WBC # BLD AUTO: 7.3 10*3/MM3 (ref 3.4–10.8)

## 2020-07-10 PROCEDURE — 36415 COLL VENOUS BLD VENIPUNCTURE: CPT | Performed by: NURSE PRACTITIONER

## 2020-07-10 PROCEDURE — 80053 COMPREHEN METABOLIC PANEL: CPT | Performed by: NURSE PRACTITIONER

## 2020-07-10 PROCEDURE — 85025 COMPLETE CBC W/AUTO DIFF WBC: CPT | Performed by: NURSE PRACTITIONER

## 2020-07-10 PROCEDURE — 83880 ASSAY OF NATRIURETIC PEPTIDE: CPT | Performed by: NURSE PRACTITIONER

## 2020-07-11 LAB — NT-PROBNP SERPL-MCNC: 799 PG/ML (ref 0–1800)

## 2020-07-13 RX ORDER — BUSPIRONE HYDROCHLORIDE 5 MG/1
5 TABLET ORAL 3 TIMES DAILY
Qty: 90 TABLET | Refills: 3 | Status: ON HOLD | OUTPATIENT
Start: 2020-07-13 | End: 2020-01-01

## 2020-08-07 ENCOUNTER — TRANSCRIBE ORDERS (OUTPATIENT)
Dept: ADMINISTRATIVE | Facility: HOSPITAL | Age: 85
End: 2020-08-07

## 2020-08-07 ENCOUNTER — LAB (OUTPATIENT)
Dept: LAB | Facility: HOSPITAL | Age: 85
End: 2020-08-07

## 2020-08-07 DIAGNOSIS — R19.7 DIARRHEA OF PRESUMED INFECTIOUS ORIGIN: ICD-10-CM

## 2020-08-07 DIAGNOSIS — R19.7 DIARRHEA OF PRESUMED INFECTIOUS ORIGIN: Primary | ICD-10-CM

## 2020-08-07 LAB
ADV 40+41 DNA STL QL NAA+NON-PROBE: NOT DETECTED
ASTRO TYP 1-8 RNA STL QL NAA+NON-PROBE: NOT DETECTED
C CAYETANENSIS DNA STL QL NAA+NON-PROBE: NOT DETECTED
C DIFF TOX GENS STL QL NAA+PROBE: NEGATIVE
CAMPY SP DNA.DIARRHEA STL QL NAA+PROBE: NOT DETECTED
CRYPTOSP STL CULT: NOT DETECTED
E COLI DNA SPEC QL NAA+PROBE: NOT DETECTED
E HISTOLYT AG STL-ACNC: NOT DETECTED
EAEC PAA PLAS AGGR+AATA ST NAA+NON-PRB: NOT DETECTED
EC STX1 + STX2 GENES STL NAA+PROBE: NOT DETECTED
EPEC EAE GENE STL QL NAA+NON-PROBE: NOT DETECTED
ETEC LTA+ST1A+ST1B TOX ST NAA+NON-PROBE: NOT DETECTED
G LAMBLIA DNA SPEC QL NAA+PROBE: NOT DETECTED
NOROVIRUS GI+II RNA STL QL NAA+NON-PROBE: NOT DETECTED
P SHIGELLOIDES DNA STL QL NAA+PROBE: NOT DETECTED
RV RNA STL NAA+PROBE: NOT DETECTED
SALMONELLA DNA SPEC QL NAA+PROBE: NOT DETECTED
SAPO I+II+IV+V RNA STL QL NAA+NON-PROBE: NOT DETECTED
SHIGELLA SP+EIEC IPAH STL QL NAA+PROBE: NOT DETECTED
V CHOLERAE DNA SPEC QL NAA+PROBE: NOT DETECTED
VIBRIO DNA SPEC NAA+PROBE: NOT DETECTED
YERSINIA STL CULT: NOT DETECTED

## 2020-08-07 PROCEDURE — 87493 C DIFF AMPLIFIED PROBE: CPT | Performed by: FAMILY MEDICINE

## 2020-08-07 PROCEDURE — 0097U HC BIOFIRE FILMARRAY GI PANEL: CPT | Performed by: FAMILY MEDICINE

## 2020-08-12 ENCOUNTER — HOSPITAL ENCOUNTER (INPATIENT)
Age: 85
LOS: 1 days | Discharge: HOME | DRG: 690 | End: 2020-08-17
Attending: EMERGENCY MEDICINE | Admitting: FAMILY MEDICINE
Payer: MEDICARE

## 2020-08-12 ENCOUNTER — APPOINTMENT (OUTPATIENT)
Dept: CT IMAGING | Age: 85
DRG: 690 | End: 2020-08-12
Payer: MEDICARE

## 2020-08-12 ENCOUNTER — APPOINTMENT (OUTPATIENT)
Dept: GENERAL RADIOLOGY | Age: 85
DRG: 690 | End: 2020-08-12
Payer: MEDICARE

## 2020-08-12 PROBLEM — N39.0 UTI (URINARY TRACT INFECTION): Status: ACTIVE | Noted: 2020-08-12

## 2020-08-12 LAB
ALBUMIN SERPL-MCNC: 3.5 G/DL (ref 3.5–5.2)
ALP BLD-CCNC: 81 U/L (ref 35–104)
ALT SERPL-CCNC: 6 U/L (ref 5–33)
ANION GAP SERPL CALCULATED.3IONS-SCNC: 7 MMOL/L (ref 7–19)
AST SERPL-CCNC: 12 U/L (ref 5–32)
BACTERIA: ABNORMAL /HPF
BASOPHILS ABSOLUTE: 0 K/UL (ref 0–0.2)
BASOPHILS RELATIVE PERCENT: 0.4 % (ref 0–1)
BILIRUB SERPL-MCNC: 0.3 MG/DL (ref 0.2–1.2)
BILIRUBIN URINE: NEGATIVE
BLOOD, URINE: NEGATIVE
BUN BLDV-MCNC: 14 MG/DL (ref 8–23)
CALCIUM SERPL-MCNC: 10.2 MG/DL (ref 8.8–10.2)
CHLORIDE BLD-SCNC: 105 MMOL/L (ref 98–111)
CLARITY: ABNORMAL
CO2: 29 MMOL/L (ref 22–29)
COLOR: ABNORMAL
CREAT SERPL-MCNC: 1.1 MG/DL (ref 0.5–0.9)
CRYSTALS, UA: ABNORMAL /HPF
EKG P AXIS: 48 DEGREES
EKG P-R INTERVAL: 124 MS
EKG Q-T INTERVAL: 486 MS
EKG QRS DURATION: 120 MS
EKG QTC CALCULATION (BAZETT): 472 MS
EKG T AXIS: 32 DEGREES
EOSINOPHILS ABSOLUTE: 0.1 K/UL (ref 0–0.6)
EOSINOPHILS RELATIVE PERCENT: 0.7 % (ref 0–5)
EPITHELIAL CELLS, UA: 0 /HPF (ref 0–5)
GFR AFRICAN AMERICAN: 57
GFR NON-AFRICAN AMERICAN: 47
GLUCOSE BLD-MCNC: 108 MG/DL (ref 74–109)
GLUCOSE URINE: NEGATIVE MG/DL
HCT VFR BLD CALC: 42.7 % (ref 37–47)
HEMOGLOBIN: 13.9 G/DL (ref 12–16)
HYALINE CASTS: 1 /HPF (ref 0–8)
IMMATURE GRANULOCYTES #: 0 K/UL
KETONES, URINE: NEGATIVE MG/DL
LEUKOCYTE ESTERASE, URINE: ABNORMAL
LYMPHOCYTES ABSOLUTE: 1.8 K/UL (ref 1.1–4.5)
LYMPHOCYTES RELATIVE PERCENT: 19.6 % (ref 20–40)
MCH RBC QN AUTO: 29.9 PG (ref 27–31)
MCHC RBC AUTO-ENTMCNC: 32.6 G/DL (ref 33–37)
MCV RBC AUTO: 91.8 FL (ref 81–99)
MONOCYTES ABSOLUTE: 0.6 K/UL (ref 0–0.9)
MONOCYTES RELATIVE PERCENT: 6.3 % (ref 0–10)
NEUTROPHILS ABSOLUTE: 6.7 K/UL (ref 1.5–7.5)
NEUTROPHILS RELATIVE PERCENT: 72.8 % (ref 50–65)
NITRITE, URINE: NEGATIVE
PDW BLD-RTO: 12.4 % (ref 11.5–14.5)
PH UA: 6 (ref 5–8)
PLATELET # BLD: 211 K/UL (ref 130–400)
PMV BLD AUTO: 10.9 FL (ref 9.4–12.3)
POTASSIUM SERPL-SCNC: 4.2 MMOL/L (ref 3.5–5)
PROTEIN UA: NEGATIVE MG/DL
RBC # BLD: 4.65 M/UL (ref 4.2–5.4)
RBC UA: 2 /HPF (ref 0–4)
SODIUM BLD-SCNC: 141 MMOL/L (ref 136–145)
SPECIFIC GRAVITY UA: 1.02 (ref 1–1.03)
TOTAL PROTEIN: 6 G/DL (ref 6.6–8.7)
TROPONIN: <0.01 NG/ML (ref 0–0.03)
UROBILINOGEN, URINE: 1 E.U./DL
WBC # BLD: 9.2 K/UL (ref 4.8–10.8)
WBC UA: 93 /HPF (ref 0–5)

## 2020-08-12 PROCEDURE — 6360000002 HC RX W HCPCS: Performed by: EMERGENCY MEDICINE

## 2020-08-12 PROCEDURE — 2580000003 HC RX 258: Performed by: EMERGENCY MEDICINE

## 2020-08-12 PROCEDURE — 96372 THER/PROPH/DIAG INJ SC/IM: CPT

## 2020-08-12 PROCEDURE — 96365 THER/PROPH/DIAG IV INF INIT: CPT

## 2020-08-12 PROCEDURE — 87086 URINE CULTURE/COLONY COUNT: CPT

## 2020-08-12 PROCEDURE — 2580000003 HC RX 258: Performed by: FAMILY MEDICINE

## 2020-08-12 PROCEDURE — 85025 COMPLETE CBC W/AUTO DIFF WBC: CPT

## 2020-08-12 PROCEDURE — 96361 HYDRATE IV INFUSION ADD-ON: CPT

## 2020-08-12 PROCEDURE — 96375 TX/PRO/DX INJ NEW DRUG ADDON: CPT

## 2020-08-12 PROCEDURE — 80053 COMPREHEN METABOLIC PANEL: CPT

## 2020-08-12 PROCEDURE — 36415 COLL VENOUS BLD VENIPUNCTURE: CPT

## 2020-08-12 PROCEDURE — 6360000002 HC RX W HCPCS: Performed by: FAMILY MEDICINE

## 2020-08-12 PROCEDURE — 87186 SC STD MICRODIL/AGAR DIL: CPT

## 2020-08-12 PROCEDURE — 93005 ELECTROCARDIOGRAM TRACING: CPT | Performed by: EMERGENCY MEDICINE

## 2020-08-12 PROCEDURE — G0378 HOSPITAL OBSERVATION PER HR: HCPCS

## 2020-08-12 PROCEDURE — 70450 CT HEAD/BRAIN W/O DYE: CPT

## 2020-08-12 PROCEDURE — 84484 ASSAY OF TROPONIN QUANT: CPT

## 2020-08-12 PROCEDURE — 81001 URINALYSIS AUTO W/SCOPE: CPT

## 2020-08-12 PROCEDURE — 99283 EMERGENCY DEPT VISIT LOW MDM: CPT

## 2020-08-12 PROCEDURE — 99282 EMERGENCY DEPT VISIT SF MDM: CPT

## 2020-08-12 PROCEDURE — 71045 X-RAY EXAM CHEST 1 VIEW: CPT

## 2020-08-12 RX ORDER — 0.9 % SODIUM CHLORIDE 0.9 %
1000 INTRAVENOUS SOLUTION INTRAVENOUS ONCE
Status: COMPLETED | OUTPATIENT
Start: 2020-08-12 | End: 2020-08-12

## 2020-08-12 RX ORDER — ASPIRIN 81 MG/1
81 TABLET, CHEWABLE ORAL DAILY
COMMUNITY

## 2020-08-12 RX ORDER — BUSPIRONE HYDROCHLORIDE 5 MG/1
5 TABLET ORAL 3 TIMES DAILY
Status: ON HOLD | COMMUNITY
End: 2020-10-23

## 2020-08-12 RX ORDER — ONDANSETRON 2 MG/ML
4 INJECTION INTRAMUSCULAR; INTRAVENOUS EVERY 8 HOURS PRN
Status: DISCONTINUED | OUTPATIENT
Start: 2020-08-12 | End: 2020-08-17 | Stop reason: HOSPADM

## 2020-08-12 RX ORDER — BENAZEPRIL HYDROCHLORIDE 40 MG/1
40 TABLET, FILM COATED ORAL DAILY
COMMUNITY

## 2020-08-12 RX ORDER — MEMANTINE HYDROCHLORIDE 28 MG/1
28 CAPSULE, EXTENDED RELEASE ORAL DAILY
Status: ON HOLD | COMMUNITY
End: 2021-08-19

## 2020-08-12 RX ORDER — SODIUM CHLORIDE 0.9 % (FLUSH) 0.9 %
10 SYRINGE (ML) INJECTION EVERY 12 HOURS SCHEDULED
Status: DISCONTINUED | OUTPATIENT
Start: 2020-08-12 | End: 2020-08-17

## 2020-08-12 RX ORDER — METOPROLOL SUCCINATE 50 MG/1
50 TABLET, EXTENDED RELEASE ORAL DAILY
COMMUNITY

## 2020-08-12 RX ORDER — ESCITALOPRAM OXALATE 20 MG/1
20 TABLET ORAL DAILY
COMMUNITY

## 2020-08-12 RX ORDER — SODIUM CHLORIDE 9 MG/ML
INJECTION, SOLUTION INTRAVENOUS CONTINUOUS
Status: DISCONTINUED | OUTPATIENT
Start: 2020-08-12 | End: 2020-08-17 | Stop reason: HOSPADM

## 2020-08-12 RX ORDER — ACETAMINOPHEN 325 MG/1
650 TABLET ORAL EVERY 4 HOURS PRN
Status: DISCONTINUED | OUTPATIENT
Start: 2020-08-12 | End: 2020-08-17 | Stop reason: HOSPADM

## 2020-08-12 RX ORDER — SODIUM CHLORIDE 0.9 % (FLUSH) 0.9 %
10 SYRINGE (ML) INJECTION PRN
Status: DISCONTINUED | OUTPATIENT
Start: 2020-08-12 | End: 2020-08-17 | Stop reason: HOSPADM

## 2020-08-12 RX ADMIN — CEFTRIAXONE 1 G: 1 INJECTION, POWDER, FOR SOLUTION INTRAMUSCULAR; INTRAVENOUS at 15:46

## 2020-08-12 RX ADMIN — SODIUM CHLORIDE 1000 ML: 9 INJECTION, SOLUTION INTRAVENOUS at 15:00

## 2020-08-12 RX ADMIN — ENOXAPARIN SODIUM 40 MG: 40 INJECTION SUBCUTANEOUS at 18:16

## 2020-08-12 RX ADMIN — SODIUM CHLORIDE: 9 INJECTION, SOLUTION INTRAVENOUS at 18:16

## 2020-08-12 ASSESSMENT — PAIN SCALES - GENERAL: PAINLEVEL_OUTOF10: 0

## 2020-08-12 NOTE — ED PROVIDER NOTES
[facility] 4 ONCOLOGY UNIT  eMERGENCY dEPARTMENT eNCOUnter      Pt Name: Fei Harvey  MRN: [id number]  [place] 3/31/1933  Date of evaluation: 8/12/2020  Provider: Sherly Gauthier MD    CHIEF COMPLAINT       Chief Complaint   Patient presents with    Altered Mental Status     arrived via EMS from assisted living facility with AMS possible UTI and having diarrhea         HISTORY OF PRESENT ILLNESS   (Location/Symptom, Timing/Onset,Context/Setting, Quality, Duration, Modifying Factors, Severity)  Note limiting factors. Fei Harvey is a 80 y.o. female who presents to the emergency department for altered mental status. The patient lives at the [facility]. A couple weeks ago she had some confusion and was diagnosed with a urinary tract infection and took a 10-day course of antibiotics however daughter is unsure the name of this from [facility]. This past week she had some return of her symptoms and Dr. Rakesh Hicks called in a 3-day course of [address] which she finished this past Thursday. Today she went to go check on her found her very disoriented and weak unable to ambulate or feed herself so called an ambulance and had her brought here for evaluation. No history of falls or trauma. Patient typically despite having known dementia is alert and oriented and currently only alert to person here but is very pleasant. Daughter does admit to have a hard time getting her to drink enough fluids. She has been having some diarrhea as well nonbloody and is been difficult for them to get a clean urine sample and the plan was to have a repeat cath specimen by home health however her and the nurse both decided she should come to the hospital for evaluation. Ambulates with a walker at baseline. HPI    NursingNotes were reviewed.     REVIEW OF SYSTEMS    (2-9 systems for level 4, 10 or more for level 5)     Review of Systems   Unable to perform ROS: Mental status change            PAST MEDICALHISTORY     Past Medical History:   Diagnosis Date    Dementia [facility])          SURGICAL HISTORY     No past surgical history on file. CURRENT MEDICATIONS     Current Discharge Medication List      CONTINUE these medications which have NOT CHANGED    Details   aspirin 81 MG chewable tablet Take 81 mg by mouth daily      memantine ER (NAMENDA XR) 28 MG CP24 extended release capsule Take 28 mg by mouth daily      metoprolol succinate (TOPROL XL) 50 MG extended release tablet Take 50 mg by mouth daily      escitalopram (LEXAPRO) 20 MG tablet Take 20 mg by mouth daily      Ergocalciferol (VITAMIN D2 PO) Take 50,000 Units by mouth twice a week On Sunday and Thursday      benazepril (LOTENSIN) 40 MG tablet Take 40 mg by mouth daily      diphenhydrAMINE-APAP, sleep, (ACETAMINOPHEN PM PO) Take by mouth nightly      busPIRone (BUSPAR) 5 MG tablet Take 5 mg by mouth 3 times daily             ALLERGIES     Patient has no known allergies. FAMILY HISTORY     No family history on file. SOCIAL HISTORY       Social History     Socioeconomic History    Marital status:       Spouse name: Not on file    Number of children: Not on file    Years of education: Not on file    Highest education level: Not on file   Occupational History    Not on file   Social Needs    Financial resource strain: Not on file    Food insecurity     Worry: Not on file     Inability: Not on file    Transportation needs     Medical: Not on file     Non-medical: Not on file   Tobacco Use    Smoking status: Not on file   Substance and Sexual Activity    Alcohol use: Not on file    Drug use: Not on file    Sexual activity: Not on file   Lifestyle    Physical activity     Days per week: Not on file     Minutes per session: Not on file    Stress: Not on file   Relationships    Social connections     Talks on phone: Not on file     Gets together: Not on file     Attends [employer] service: Not on file     Active member of club or organization: Not on COURSE and DIFFERENTIAL DIAGNOSIS/MDM:   Vitals:    Vitals:    08/12/20 1309 08/12/20 1520 08/12/20 1532 08/12/20 1710   BP: (!) 156/55 (!) 154/72 (!) 143/78 138/72   Pulse: 55   60   Resp: 14   16   Temp: 98.2 °F (36.8 °C)   97 °F (36.1 °C)   TempSrc: Oral   Temporal   SpO2: 93% 93% 91% 94%       MDM  Number of Diagnoses or Management Options     Amount and/or Complexity of Data Reviewed  Clinical lab tests: ordered and reviewed  Tests in the radiology section of CPT®: ordered and reviewed  Discuss the patient with other providers: yes  Independent visualization of images, tracings, or specimens: yes      Recent UTI, worsening confusion despite outpt antibx x2, encephalopathy due to UTI I suspect, appears dehydrated as well, no prior cultures available, given rocephin and fluids, d/w Dr. Slim Aguilar on call for admission    CONSULTS:  None    PROCEDURES:  Unless otherwise noted below, none     Procedures    FINAL IMPRESSION      1. Acute cystitis without hematuria    2. Altered mental status, unspecified altered mental status type          DISPOSITION/PLAN   DISPOSITION        PATIENT REFERRED TO:  No follow-up provider specified.     DISCHARGE MEDICATIONS:  Current Discharge Medication List             (Please note that portions of this note were completed with a voice recognition program.  Efforts were made to edit thedictations but occasionally words are mis-transcribed.)    Brie Plascencia MD (electronically signed)  Attending Emergency Physician        Merissa Kemp MD  08/12/20 1394

## 2020-08-13 PROBLEM — Z51.5 PALLIATIVE CARE PATIENT: Status: ACTIVE | Noted: 2020-08-13

## 2020-08-13 PROBLEM — R19.7 DIARRHEA: Status: ACTIVE | Noted: 2020-08-13

## 2020-08-13 PROBLEM — N18.30 CKD (CHRONIC KIDNEY DISEASE), STAGE III (HCC): Status: ACTIVE | Noted: 2020-08-13

## 2020-08-13 PROBLEM — I10 ESSENTIAL HYPERTENSION: Status: ACTIVE | Noted: 2020-08-13

## 2020-08-13 PROBLEM — R41.82 ALTERED MENTAL STATUS: Status: ACTIVE | Noted: 2020-08-13

## 2020-08-13 PROBLEM — F01.50 VASCULAR DEMENTIA (HCC): Status: ACTIVE | Noted: 2020-08-13

## 2020-08-13 PROBLEM — R00.1 BRADYCARDIA: Status: ACTIVE | Noted: 2020-08-13

## 2020-08-13 LAB
GLUCOSE BLD-MCNC: 94 MG/DL (ref 70–99)
PERFORMED ON: NORMAL

## 2020-08-13 PROCEDURE — 2580000003 HC RX 258: Performed by: FAMILY MEDICINE

## 2020-08-13 PROCEDURE — 6360000002 HC RX W HCPCS: Performed by: FAMILY MEDICINE

## 2020-08-13 PROCEDURE — 96365 THER/PROPH/DIAG IV INF INIT: CPT

## 2020-08-13 PROCEDURE — G0378 HOSPITAL OBSERVATION PER HR: HCPCS

## 2020-08-13 PROCEDURE — 96372 THER/PROPH/DIAG INJ SC/IM: CPT

## 2020-08-13 PROCEDURE — 6370000000 HC RX 637 (ALT 250 FOR IP): Performed by: PHYSICIAN ASSISTANT

## 2020-08-13 PROCEDURE — 6370000000 HC RX 637 (ALT 250 FOR IP): Performed by: FAMILY MEDICINE

## 2020-08-13 PROCEDURE — 82947 ASSAY GLUCOSE BLOOD QUANT: CPT

## 2020-08-13 RX ORDER — DIPHENHYDRAMINE HCL 25 MG
50 TABLET ORAL NIGHTLY PRN
Status: DISCONTINUED | OUTPATIENT
Start: 2020-08-13 | End: 2020-08-17 | Stop reason: HOSPADM

## 2020-08-13 RX ORDER — MEMANTINE HYDROCHLORIDE 5 MG/1
10 TABLET ORAL 2 TIMES DAILY
Status: DISCONTINUED | OUTPATIENT
Start: 2020-08-13 | End: 2020-08-17 | Stop reason: HOSPADM

## 2020-08-13 RX ORDER — ACETAMINOPHEN 500 MG
500 TABLET ORAL NIGHTLY PRN
Status: DISCONTINUED | OUTPATIENT
Start: 2020-08-13 | End: 2020-08-17 | Stop reason: HOSPADM

## 2020-08-13 RX ORDER — ERGOCALCIFEROL 1.25 MG/1
50000 CAPSULE ORAL
Status: DISCONTINUED | OUTPATIENT
Start: 2020-08-13 | End: 2020-08-17 | Stop reason: HOSPADM

## 2020-08-13 RX ORDER — ASPIRIN 81 MG/1
81 TABLET, CHEWABLE ORAL DAILY
Status: DISCONTINUED | OUTPATIENT
Start: 2020-08-13 | End: 2020-08-17 | Stop reason: HOSPADM

## 2020-08-13 RX ORDER — ESCITALOPRAM OXALATE 10 MG/1
20 TABLET ORAL DAILY
Status: DISCONTINUED | OUTPATIENT
Start: 2020-08-13 | End: 2020-08-17 | Stop reason: HOSPADM

## 2020-08-13 RX ORDER — METOPROLOL SUCCINATE 50 MG/1
50 TABLET, EXTENDED RELEASE ORAL DAILY
Status: DISCONTINUED | OUTPATIENT
Start: 2020-08-13 | End: 2020-08-17 | Stop reason: HOSPADM

## 2020-08-13 RX ORDER — BUSPIRONE HYDROCHLORIDE 5 MG/1
5 TABLET ORAL 3 TIMES DAILY
Status: DISCONTINUED | OUTPATIENT
Start: 2020-08-13 | End: 2020-08-17 | Stop reason: HOSPADM

## 2020-08-13 RX ORDER — LISINOPRIL 20 MG/1
20 TABLET ORAL DAILY
Status: DISCONTINUED | OUTPATIENT
Start: 2020-08-13 | End: 2020-08-17 | Stop reason: HOSPADM

## 2020-08-13 RX ORDER — ACETAMINOPHEN,DIPHENHYDRAMINE HCL 500; 25 MG/1; MG/1
1 TABLET, FILM COATED ORAL NIGHTLY PRN
Status: DISCONTINUED | OUTPATIENT
Start: 2020-08-13 | End: 2020-08-13

## 2020-08-13 RX ORDER — LEVOFLOXACIN 5 MG/ML
500 INJECTION, SOLUTION INTRAVENOUS EVERY 24 HOURS
Status: DISCONTINUED | OUTPATIENT
Start: 2020-08-13 | End: 2020-08-14

## 2020-08-13 RX ADMIN — MEMANTINE HYDROCHLORIDE 10 MG: 5 TABLET ORAL at 09:37

## 2020-08-13 RX ADMIN — ENOXAPARIN SODIUM 40 MG: 40 INJECTION SUBCUTANEOUS at 09:38

## 2020-08-13 RX ADMIN — METOPROLOL SUCCINATE 50 MG: 50 TABLET, EXTENDED RELEASE ORAL at 09:37

## 2020-08-13 RX ADMIN — LEVOFLOXACIN 500 MG: 5 INJECTION, SOLUTION INTRAVENOUS at 18:35

## 2020-08-13 RX ADMIN — ASPIRIN 81 MG 81 MG: 81 TABLET ORAL at 09:37

## 2020-08-13 RX ADMIN — ACETAMINOPHEN 650 MG: 325 TABLET, FILM COATED ORAL at 20:28

## 2020-08-13 RX ADMIN — BUSPIRONE HYDROCHLORIDE 5 MG: 5 TABLET ORAL at 20:28

## 2020-08-13 RX ADMIN — MEMANTINE HYDROCHLORIDE 10 MG: 5 TABLET ORAL at 20:28

## 2020-08-13 RX ADMIN — BUSPIRONE HYDROCHLORIDE 5 MG: 5 TABLET ORAL at 15:50

## 2020-08-13 RX ADMIN — ESCITALOPRAM OXALATE 20 MG: 10 TABLET ORAL at 09:37

## 2020-08-13 RX ADMIN — SODIUM CHLORIDE: 9 INJECTION, SOLUTION INTRAVENOUS at 20:28

## 2020-08-13 RX ADMIN — SODIUM CHLORIDE: 9 INJECTION, SOLUTION INTRAVENOUS at 06:18

## 2020-08-13 RX ADMIN — BUSPIRONE HYDROCHLORIDE 5 MG: 5 TABLET ORAL at 09:37

## 2020-08-13 RX ADMIN — ERGOCALCIFEROL 50000 UNITS: 1.25 CAPSULE ORAL at 18:34

## 2020-08-13 RX ADMIN — LISINOPRIL 20 MG: 20 TABLET ORAL at 09:37

## 2020-08-13 ASSESSMENT — PAIN SCALES - GENERAL: PAINLEVEL_OUTOF10: 3

## 2020-08-13 NOTE — PLAN OF CARE
Problem: Falls - Risk of:  Goal: Will remain free from falls  Description: Will remain free from falls  8/13/2020 0414 by Toan Craig RN  Outcome: Ongoing  8/13/2020 0414 by Toan Craig RN  Outcome: Ongoing  Goal: Absence of physical injury  Description: Absence of physical injury  8/13/2020 0414 by Toan Craig RN  Outcome: Ongoing  8/13/2020 0414 by Toan Craig RN  Outcome: Ongoing     Problem: Skin Integrity:  Goal: Will show no infection signs and symptoms  Description: Will show no infection signs and symptoms  8/13/2020 0414 by Toan Craig RN  Outcome: Ongoing  8/13/2020 0414 by Toan Craig RN  Outcome: Ongoing  Goal: Absence of new skin breakdown  Description: Absence of new skin breakdown  8/13/2020 0414 by Toan Craig RN  Outcome: Ongoing  8/13/2020 0414 by Toan Craig RN  Outcome: Ongoing     Problem: Confusion - Acute:  Goal: Absence of continued neurological deterioration signs and symptoms  Description: Absence of continued neurological deterioration signs and symptoms  8/13/2020 0414 by Toan Craig RN  Outcome: Ongoing  8/13/2020 0414 by Toan Craig RN  Outcome: Ongoing  Goal: Mental status will be restored to baseline  Description: Mental status will be restored to baseline  8/13/2020 0414 by Toan Craig RN  Outcome: Ongoing  8/13/2020 0414 by Toan Craig RN  Outcome: Ongoing     Problem: Discharge Planning:  Goal: Ability to perform activities of daily living will improve  Description: Ability to perform activities of daily living will improve  8/13/2020 0414 by Toan Craig RN  Outcome: Ongoing  8/13/2020 0414 by Toan Craig RN  Outcome: Ongoing  Goal: Participates in care planning  Description: Participates in care planning  8/13/2020 0414 by Toan Craig RN  Outcome: Ongoing  8/13/2020 0414 by Toan Craig RN  Outcome: Ongoing     Problem: Injury - Risk of, Physical Injury:  Goal: Will remain free from falls  Description: Will remain free from falls  8/13/2020 0414 by Tianna Ferguson RN  Outcome: Ongoing  8/13/2020 0414 by Tianna Ferguson RN  Outcome: Ongoing  Goal: Absence of physical injury  Description: Absence of physical injury  8/13/2020 0414 by Tianna Ferguson RN  Outcome: Ongoing  8/13/2020 0414 by Tianna Ferguson RN  Outcome: Ongoing     Problem: Mood - Altered:  Goal: Mood stable  Description: Mood stable  8/13/2020 0414 by Tianna Ferguson RN  Outcome: Ongoing  8/13/2020 0414 by Tianna Ferguson RN  Outcome: Ongoing  Goal: Absence of abusive behavior  Description: Absence of abusive behavior  8/13/2020 0414 by Tianna Ferguson RN  Outcome: Ongoing  8/13/2020 0414 by Tianna Ferguson RN  Outcome: Ongoing  Goal: Verbalizations of feeling emotionally comfortable while being cared for will increase  Description: Verbalizations of feeling emotionally comfortable while being cared for will increase  8/13/2020 0414 by Tianna Ferguson RN  Outcome: Ongoing  8/13/2020 0414 by Tianna Ferguson RN  Outcome: Ongoing     Problem: Psychomotor Activity - Altered:  Goal: Absence of psychomotor disturbance signs and symptoms  Description: Absence of psychomotor disturbance signs and symptoms  8/13/2020 0414 by Tianna Ferguson RN  Outcome: Ongoing  8/13/2020 0414 by Tianna Ferguson RN  Outcome: Ongoing     Problem: Sensory Perception - Impaired:  Goal: Demonstrations of improved sensory functioning will increase  Description: Demonstrations of improved sensory functioning will increase  8/13/2020 0414 by Tianna Ferguson RN  Outcome: Ongoing  8/13/2020 0414 by Tianna Ferguson RN  Outcome: Ongoing  Goal: Decrease in sensory misperception frequency  Description: Decrease in sensory misperception frequency  8/13/2020 0414 by Tianna Ferguson RN  Outcome: Ongoing  8/13/2020 0414 by Tianna Ferguson RN  Outcome: Ongoing  Goal: Able to refrain from responding to false sensory perceptions  Description: Able to refrain from responding to false sensory perceptions  8/13/2020 0414 by Teddy Mathew RN  Outcome: Ongoing  8/13/2020 0414 by Teddy Mathew RN  Outcome: Ongoing  Goal: Demonstrates accurate environmental perceptions  Description: Demonstrates accurate environmental perceptions  8/13/2020 0414 by Teddy Mathew RN  Outcome: Ongoing  8/13/2020 0414 by Teddy Mathew RN  Outcome: Ongoing  Goal: Able to distinguish between reality-based and nonreality-based thinking  Description: Able to distinguish between reality-based and nonreality-based thinking  8/13/2020 0414 by Teddy Mathew RN  Outcome: Ongoing  8/13/2020 0414 by Teddy Mathew RN  Outcome: Ongoing  Goal: Able to interrupt nonreality-based thinking  Description: Able to interrupt nonreality-based thinking  8/13/2020 0414 by Teddy Mathew RN  Outcome: Ongoing  8/13/2020 0414 by Teddy Mathew RN  Outcome: Ongoing     Problem: Sleep Pattern Disturbance:  Goal: Appears well-rested  Description: Appears well-rested  8/13/2020 0414 by Teddy Mathew RN  Outcome: Ongoing  8/13/2020 0414 by Teddy Mathew RN  Outcome: Ongoing     Problem: Sensory:  Goal: General experience of comfort will improve  Description: General experience of comfort will improve  8/13/2020 0414 by Teddy Mathew RN  Outcome: Ongoing  8/13/2020 0414 by Teddy Mathew RN  Outcome: Ongoing     Problem: Urinary Elimination:  Goal: Signs and symptoms of infection will decrease  Description: Signs and symptoms of infection will decrease  8/13/2020 0414 by Teddy Mathew RN  Outcome: Ongoing  8/13/2020 0414 by Teddy Mathew RN  Outcome: Ongoing  Goal: Ability to reestablish a normal urinary elimination pattern will improve - after catheter removal  Description: Ability to reestablish a normal urinary elimination pattern will improve  8/13/2020 0414 by Teddy Mathew RN  Outcome: Ongoing  8/13/2020 0414 by Teddy Mathew RN  Outcome: Ongoing  Goal: Complications related to the disease process, condition or treatment will be avoided or minimized  Description: Complications related to the disease process, condition or treatment will be avoided or minimized  8/13/2020 0414 by Kylie Trevino RN  Outcome: Ongoing  8/13/2020 0414 by Kylie Trevino RN  Outcome: Ongoing

## 2020-08-13 NOTE — ACP (ADVANCE CARE PLANNING)
Advance Care Planning     Advance Care Planning Activator (Inpatient)  Conversation Note      Date of ACP Conversation: 8/13/2020    Conversation Conducted with: Pt's daughter, Africa Kramer    ACP Activator: [address] Decision Maker:     Current Designated Health Care Decision Maker:   Primary Decision Maker: Kanu Avila - Unknown - [phone]    Secondary Decision Maker: Dandre Johns - [phone]  Care Preferences    Ventilation: \"If you were in your present state of health and suddenly became very ill and were unable to breathe on your own, what would your preference be about the use of a ventilator (breathing machine) if it were available to you? \"      Would the patient desire the use of ventilator (breathing machine)?: NO      Resuscitation  \"CPR works best to restart the heart when there is a sudden event, like a heart attack, in someone who is otherwise healthy. Unfortunately, CPR does not typically restart the heart for people who have serious health conditions or who are very sick. \"    \"In the event your heart stopped as a result of an underlying serious health condition, would you want attempts to be made to restart your heart (answer \"yes\" for attempt to resuscitate) or would you prefer a natural death (answer \"no\" for do not attempt to resuscitate)? \" NO        Conversation Outcomes:  [x] ACP discussion completed  [x] Existing advance directive reviewed with patient; no changes to patient's previously recorded wishes  [] New Advance Directive completed  [] Portable Do Not Rescitate prepared for Provider review and signature  [] POLST/POST/MOLST/MOST prepared for Provider review and signature      Electronically signed by Zhane Perea RN on 8/13/2020 at 1:34 PM

## 2020-08-13 NOTE — H&P
[facility]  History and Physical    Patient:  Young Maguire  MRN: [id number]    CHIEF COMPLAINT: ER via EMS from assisted living with altered mental status      PCP: No primary care provider on file. HISTORY OF PRESENT ILLNESS:   Young Maguire is a 80 y.o. female who presented to the emergency department for altered mental status at noon yesterday. .  [place] [facility] A couple weeks ago she had some confusion and was diagnosed with a urinary tract infection and took a 10-day course of antibiotics from [place]. This past week she had some return of her symptoms and was called in Eddie from our office. Yesterday her family went to go check on her found her very disoriented and weak unable to ambulate or feed herself so called an ambulance and had her brought here for evaluation. No history of falls or trauma. Family relates she has had decrease appetite. They have encouraged her to drink more fluids over the past couple weeks. The patient complains of altered taste sensation. She has been having some diarrhea as well nonbloody and is been difficult for them to get a clean urine sample and the plan was to have a repeat cath specimen by home health however her and the nurse both decided she should come to the hospital for evaluation. Family is at bedside this a.m. Patient states she is feeling better. She seems more alert and back to her baseline. Extended family conference held. Past Medical History:      Diagnosis Date    Dementia [facility])        Past Surgical History:  No past surgical history on file. Medications Prior to Admission:    Prior to Admission medications    Medication Sig Start Date End Date Taking?  Authorizing Provider   aspirin 81 MG chewable tablet Take 81 mg by mouth daily   Yes Historical Provider, MD   memantine ER (NAMENDA XR) 28 MG CP24 extended release capsule Take 28 mg by mouth daily   Yes Historical Provider, MD metoprolol succinate (TOPROL XL) 50 MG extended release tablet Take 50 mg by mouth daily   Yes Historical Provider, MD   escitalopram (LEXAPRO) 20 MG tablet Take 20 mg by mouth daily   Yes Historical Provider, MD   Ergocalciferol (VITAMIN D2 PO) Take 50,000 Units by mouth twice a week On Sunday and Thursday   Yes Historical Provider, MD   benazepril (LOTENSIN) 40 MG tablet Take 40 mg by mouth daily   Yes Historical Provider, MD   diphenhydrAMINE-APAP, sleep, (ACETAMINOPHEN PM PO) Take by mouth nightly   Yes Historical Provider, MD   busPIRone (BUSPAR) 5 MG tablet Take 5 mg by mouth 3 times daily   Yes Historical Provider, MD       Allergies:  Patient has no known allergies. Social History:   Social History     Socioeconomic History    Marital status:       Spouse name: Not on file    Number of children: Not on file    Years of education: Not on file    Highest education level: Not on file   Occupational History    Not on file   Social Needs    Financial resource strain: Not on file    Food insecurity     Worry: Not on file     Inability: Not on file    Transportation needs     Medical: Not on file     Non-medical: Not on file   Tobacco Use    Smoking status: Not on file   Substance and Sexual Activity    Alcohol use: Not on file    Drug use: Not on file    Sexual activity: Not on file   Lifestyle    Physical activity     Days per week: Not on file     Minutes per session: Not on file    Stress: Not on file   Relationships    Social connections     Talks on phone: Not on file     Gets together: Not on file     Attends [employer] service: Not on file     Active member of club or organization: Not on file     Attends meetings of clubs or organizations: Not on file     Relationship status: Not on file    Intimate partner violence     Fear of current or ex partner: Not on file     Emotionally abused: Not on file     Physically abused: Not on file     Forced sexual activity: Not on file   Other Topics Concern    Not on file   Social History Narrative    Not on file       Family History:   No family history on file. Review of systems:  General-patient denies any fevers chills rigors night sweats  HEENT -no hearing or visual changes, positive for altered taste  Pulmonary- no cough, hemoptysis or shortness of breath  Cardiac- denies chest pains palpitations orthopnea  GI -positive for diarrhea but no blood in stool  --no report of hematuria or significant dysuria  Neuro--as above  Musculoskeletal--no increased joint or muscle pain    Physical Exam:    Vitals: BP (!) 144/76   Pulse 61   Temp 97.4 °F (36.3 °C) (Temporal)   Resp 16   Wt 204 lb 11.2 oz (92.9 kg)   SpO2 92%     GENERAL: Elderly generally weak but no distress  HEENT: NC/AT PERRL, OP negative without sig erythema or exudate, neck is supple without masses or carotid bruit noted    CV: normal S1 and S2, no sig murmur, lift or gallop, normal PMI. ECG revealed LVH and left axis deviation  CHEST: clear to auscultation both anterior and posterior, no rales rhonchi or wheeze appreciated. ABD: soft NT/ND with normoactive BS noted. No guarding or rebounding noted  /rectal: deferred  EXT: no cyanosis or clubbing noted, normal ROM  DERM: skin is warm and dry without sig rashes on exposed areas  PSYCH: appears mentally stable with no obvious abnormalities  NEURO: Appears at baseline. Alert and oriented person place. CBC:   Recent Labs     08/12/20  1313   WBC 9.2   HGB 13.9        BMP:    Recent Labs     08/12/20  1313      K 4.2      CO2 29   BUN 14   CREATININE 1.1*   GLUCOSE 108     Hepatic:   Recent Labs     08/12/20  1313   AST 12   ALT 6   BILITOT 0.3   ALKPHOS 81     Troponin T:   Recent Labs     08/12/20  1313   TROPONINI <0.01     Pro-BNP: No results for input(s): BNP in the last 72 hours. Lipids: No results for input(s): CHOL, HDL in the last 72 hours.     Invalid input(s): LDLCALCU  ABGs: No results found for: PHART, PO2ART, IRL2DHX  INR: No results for input(s): INR in the last 72 hours. -----------------------------------------------------------------  EKG:   Radiology:     Ct Head Wo Contrast    Result Date: 8/12/2020  CT BRAIN without contrast 8/12/2020 12:59 PM HISTORY: Altered mental status. COMPARISON: None DLP: 826 mGy cm. Automated exposure control was utilized to diminish patient radiation dose. TECHNIQUE: Serial axial tomographic images of the brain were obtained without the use of intravenous contrast. FINDINGS: The midline structures are nondisplaced. There is moderate cerebral and cerebellar volume loss, with an associated increase in the prominence of the ventricles and sulci. The basilar cisterns are normal in size and configuration. There is no evidence of intracranial hemorrhage or mass-effect. There is low attenuation in the periventricular white matter, consistent with chronic ischemic change. There are no abnormal extra-axial fluid collections. There is no evidence of tonsillar herniation. The included orbits and their contents are unremarkable. The visualized paranasal sinuses, mastoid air cells and middle ear cavities are clear. The visualized osseous structures and overlying soft tissues of the skull and face are intact. 1. Moderate cerebral and cerebellar volume loss with chronic microvascular disease but no evidence of acute intracranial process. Signed by Dr Leonel Jason on 8/12/2020 2:15 PM    Xr Chest Portable    Result Date: 8/12/2020  Examination. XR CHEST PORTABLE 8/12/2020 12:38 PM History: Altered mental status. A frontal portable upright view of the chest is compared with the previous study dated 5/30/2014. The lungs are poorly inflated. There are scarring and discoid atelectasis in the lower lung bilaterally. There is no pleural effusion, pulmonary congestion or pneumothorax. The heart size is moderately enlarged. Atheromatous plaques are seen in the aortic arch.  There is no significant bony abnormality. The poor lung expansion but no active cardiopulmonary disease. Signed by Dr Chester Evangelista on 8/12/2020 1:48 PM      Assessment and Plan     Active Problems:    UTI (urinary tract infection)  Altered mental status  Vascular dementia--continue Namenda XR  CKD stage III  Essential hypertension-- Lotensin, metoprolol  Bradycardia  Diarrhea    IV fluids for hydration  IV Rocephin for UTI. Await culture and sensitivity  Check stool for C. difficile  CBC normal, chest x-ray clear. CT findings chronic microvascular disease  COVID testing with altered taste  AMS change likely related to UTI. Symptoms seem to be improving. Continue current course of care. Return to assisted living tomorrow. Encouraged to use PCP over urgent care for outpatient needs. DNR comfort care, wishes respected    [place]  . 8/13/2020     Multiple phone calls over the weekend from assisted care living as well as home health care. Set up patient to have a in and out cath to get an accurate urine specimen. Stool cultures and C. difficile were negative last week. Will place on IV fluids and IV antibiotics. The patient was seen on rounds today. Reviewed the last 24 hours of labs and x-rays that are available. Updated overnight status with nursing staff. Reviewed the case with Zeyad Panchal PA-C. All questions were answered to the patient's/family's understanding. Patient is medically stable, please see orders for further details. I have discussed the care of Dipika Winkler, including pertinent history and exam findings with the ARNP/PA. I have seen and examined the patient and the key elements of all parts of the encounter have been performed by me. I agree with the assessment and plan as outlined by the ARNP/PA. Please refer to my separate note for complete documentation.      Electronically signed by Bandar Chatterjee MD on 8/13/2020 at 8:20 AM

## 2020-08-13 NOTE — CONSULTS
Palliative Care:    Initiated for support, goals of care and ACP. Pt was sleeping and her daughter was present. [facility] Daughter reports she has several UTI's in recent past.  Pt was provided meals at the facility and was up with a walker. Currently she is quite weak and daughter reports pt was unable to feed herself and she assisted. Family states goal is for her to return to the [facility] if at all possible. Pt is current with [facility]. Past Medical History:        Past Medical History:   Diagnosis Date    Dementia [facility])        Advance Directives:    DNR  Pt has POA  ACP note completed     Pain/Other Symptoms:    No pain or soa at this time. Pt resting and shows no evidence or s/s of distress         Psychological/Spiritual:    Support from her home [facility], [address] [place] [address]            Plan:    Continue current medical treatment and monitoring    Support provided.   Will follow POC      Electronically signed by Ese Wang RN on 8/13/2020 at 11:48 AM

## 2020-08-14 LAB
ANION GAP SERPL CALCULATED.3IONS-SCNC: 8 MMOL/L (ref 7–19)
BUN BLDV-MCNC: 13 MG/DL (ref 8–23)
CALCIUM SERPL-MCNC: 9.3 MG/DL (ref 8.8–10.2)
CHLORIDE BLD-SCNC: 109 MMOL/L (ref 98–111)
CO2: 26 MMOL/L (ref 22–29)
CREAT SERPL-MCNC: 0.9 MG/DL (ref 0.5–0.9)
GFR AFRICAN AMERICAN: >59
GFR NON-AFRICAN AMERICAN: 59
GLUCOSE BLD-MCNC: 94 MG/DL (ref 74–109)
ORGANISM: ABNORMAL
POTASSIUM SERPL-SCNC: 4 MMOL/L (ref 3.5–5)
SODIUM BLD-SCNC: 143 MMOL/L (ref 136–145)
URINE CULTURE, ROUTINE: ABNORMAL
URINE CULTURE, ROUTINE: ABNORMAL

## 2020-08-14 PROCEDURE — 6370000000 HC RX 637 (ALT 250 FOR IP): Performed by: PHYSICIAN ASSISTANT

## 2020-08-14 PROCEDURE — 6370000000 HC RX 637 (ALT 250 FOR IP): Performed by: FAMILY MEDICINE

## 2020-08-14 PROCEDURE — 96372 THER/PROPH/DIAG INJ SC/IM: CPT

## 2020-08-14 PROCEDURE — 80048 BASIC METABOLIC PNL TOTAL CA: CPT

## 2020-08-14 PROCEDURE — 96376 TX/PRO/DX INJ SAME DRUG ADON: CPT

## 2020-08-14 PROCEDURE — G0378 HOSPITAL OBSERVATION PER HR: HCPCS

## 2020-08-14 PROCEDURE — 36415 COLL VENOUS BLD VENIPUNCTURE: CPT

## 2020-08-14 PROCEDURE — 6360000002 HC RX W HCPCS: Performed by: FAMILY MEDICINE

## 2020-08-14 PROCEDURE — 2580000003 HC RX 258: Performed by: FAMILY MEDICINE

## 2020-08-14 RX ORDER — NYSTATIN 100000 U/G
CREAM TOPICAL 2 TIMES DAILY
Status: DISCONTINUED | OUTPATIENT
Start: 2020-08-14 | End: 2020-08-17 | Stop reason: HOSPADM

## 2020-08-14 RX ORDER — CEFTRIAXONE 1 G/1
1 INJECTION, POWDER, FOR SOLUTION INTRAMUSCULAR; INTRAVENOUS EVERY 24 HOURS
Status: DISCONTINUED | OUTPATIENT
Start: 2020-08-14 | End: 2020-08-16 | Stop reason: SDUPTHER

## 2020-08-14 RX ORDER — MEGESTROL ACETATE 40 MG/ML
400 SUSPENSION ORAL DAILY
Status: DISCONTINUED | OUTPATIENT
Start: 2020-08-14 | End: 2020-08-17 | Stop reason: HOSPADM

## 2020-08-14 RX ORDER — FLUCONAZOLE 100 MG/1
100 TABLET ORAL DAILY
Status: DISCONTINUED | OUTPATIENT
Start: 2020-08-14 | End: 2020-08-17

## 2020-08-14 RX ADMIN — ENOXAPARIN SODIUM 40 MG: 40 INJECTION SUBCUTANEOUS at 08:54

## 2020-08-14 RX ADMIN — ACETAMINOPHEN 650 MG: 325 TABLET, FILM COATED ORAL at 08:53

## 2020-08-14 RX ADMIN — SODIUM CHLORIDE, PRESERVATIVE FREE 10 ML: 5 INJECTION INTRAVENOUS at 09:07

## 2020-08-14 RX ADMIN — BUSPIRONE HYDROCHLORIDE 5 MG: 5 TABLET ORAL at 08:54

## 2020-08-14 RX ADMIN — CEFTRIAXONE 1 G: 1 INJECTION, POWDER, FOR SOLUTION INTRAMUSCULAR; INTRAVENOUS at 09:07

## 2020-08-14 RX ADMIN — ESCITALOPRAM OXALATE 20 MG: 10 TABLET ORAL at 08:53

## 2020-08-14 RX ADMIN — FLUCONAZOLE 100 MG: 100 TABLET ORAL at 18:20

## 2020-08-14 RX ADMIN — ACETAMINOPHEN 650 MG: 325 TABLET, FILM COATED ORAL at 18:20

## 2020-08-14 RX ADMIN — MEGESTROL ACETATE 400 MG: 40 SUSPENSION ORAL at 09:13

## 2020-08-14 RX ADMIN — MEMANTINE HYDROCHLORIDE 10 MG: 5 TABLET ORAL at 08:52

## 2020-08-14 RX ADMIN — ASPIRIN 81 MG 81 MG: 81 TABLET ORAL at 08:53

## 2020-08-14 RX ADMIN — METOPROLOL SUCCINATE 50 MG: 50 TABLET, EXTENDED RELEASE ORAL at 08:53

## 2020-08-14 RX ADMIN — BUSPIRONE HYDROCHLORIDE 5 MG: 5 TABLET ORAL at 15:25

## 2020-08-14 RX ADMIN — LISINOPRIL 20 MG: 20 TABLET ORAL at 08:54

## 2020-08-14 ASSESSMENT — PAIN SCALES - GENERAL
PAINLEVEL_OUTOF10: 5
PAINLEVEL_OUTOF10: 7

## 2020-08-14 NOTE — PROGRESS NOTES
Supportive visit:  Pt was more alert this morning. Her daughter is at bedside. PCA preparing for pt's bath. Family states pt is not eating, an appetite stimulant has been started. Goal is return to assisted living at discharge. [address] to follow. Encouragement and support provided.     Electronically signed by Rishabh Bsihop RN on 8/14/2020 at 12:40 PM

## 2020-08-14 NOTE — PROGRESS NOTES
Olayinka Romano is a 80 y.o. female patient. Conference with daughter at bedside. Limited oral intake. Not been out of bed. Not ready to discharge back to assisted living.         Current Facility-Administered Medications   Medication Dose Route Frequency Provider Last Rate Last Dose    cefTRIAXone (ROCEPHIN) injection 1 g  1 g Intravenous Q24H Marcus Rodriguez MD        megestrol acetate (MEGACE) 400 MG/10ML suspension 400 mg  400 mg Oral Daily Marcus Rodriguez MD        aspirin chewable tablet 81 mg  81 mg Oral Daily Cristiana Puente PA-C   81 mg at 08/13/20 1707    memantine (NAMENDA) tablet 10 mg  10 mg Oral BID Cristiana Puente PA-C   10 mg at 08/13/20 2028    metoprolol succinate (TOPROL XL) extended release tablet 50 mg  50 mg Oral Daily Cristiana Puente PA-C   50 mg at 08/13/20 2173    escitalopram (LEXAPRO) tablet 20 mg  20 mg Oral Daily Cristiana Puente PA-C   20 mg at 08/13/20 9892    vitamin D (ERGOCALCIFEROL) capsule 50,000 Units  50,000 Units Oral Once per day on Mon Thu Cristiana Puente PA-C   50,000 Units at 08/13/20 1834    lisinopril (PRINIVIL;ZESTRIL) tablet 20 mg  20 mg Oral Daily Cristiana Puente PA-C   20 mg at 08/13/20 5890    busPIRone (BUSPAR) tablet 5 mg  5 mg Oral TID Cristiana Puente PA-C   5 mg at 08/13/20 2028    diphenhydrAMINE (BENADRYL) tablet 50 mg  50 mg Oral Nightly PRN Cristiana Puente PA-C        And    acetaminophen (TYLENOL) tablet 500 mg  500 mg Oral Nightly PRN Cristiana Puente PA-C        sodium chloride flush 0.9 % injection 10 mL  10 mL Intravenous 2 times per day Marucs Rodriguez MD        sodium chloride flush 0.9 % injection 10 mL  10 mL Intravenous PRN Marcus Rodriguez MD        acetaminophen (TYLENOL) tablet 650 mg  650 mg Oral Q4H PRN Marcus Rodriguez MD   650 mg at 08/13/20 2028    enoxaparin (LOVENOX) injection 40 mg  40 mg Subcutaneous Daily Marcus Rodriguez MD   40 mg at 08/13/20 0938    0.9 % sodium chloride infusion   Intravenous Continuous Chaitanya Cifuentes David Torres MD 75 mL/hr at 08/13/20 2028      ondansetron (ZOFRAN) injection 4 mg  4 mg Intravenous Q8H PRN Franny Montejo MD         No Known Allergies  Principal Problem:    Altered mental status  Active Problems:    UTI (urinary tract infection)    Diarrhea    Bradycardia    Vascular dementia ([address]    CKD (chronic kidney disease), stage III [facility])    Essential hypertension    Palliative care patient  Resolved Problems:    * No resolved hospital problems. *    Blood pressure (!) 142/94, pulse 53, temperature 98.1 °F (36.7 °C), temperature source Temporal, resp. rate 18, height 5' 7\" (1.702 m), weight 204 lb 11.2 oz (92.9 kg), SpO2 96 %. Subjective:  Symptoms:  Stable. Diet:  Poor intake. Activity level: Impaired due to weakness. Pain:  She reports no pain. Objective:  General Appearance:  Comfortable and well-appearing. Vital signs: (most recent): Blood pressure (!) 142/94, pulse 53, temperature 98.1 °F (36.7 °C), temperature source Temporal, resp. rate 18, height 5' 7\" (1.702 m), weight 204 lb 11.2 oz (92.9 kg), SpO2 96 %. Vital signs are normal.    Output: Producing urine (Per external system) and minimal stool output. HEENT: Normal HEENT exam.    Lungs:  Normal effort and normal respiratory rate. Heart: Normal rate. Regular rhythm. Abdomen: Abdomen is soft. Bowel sounds are normal.   There is no abdominal tenderness. Extremities: Normal range of motion. Neurological: (Confused, gives the year as 2020 but unable to give me place time. Could not recall my name or even the President of the [place]). Skin:  Warm and dry. Assessment:    Condition: In stable condition. Unchanged. (    Acute cystitis/urinary tract infection present on admission- discussed with family. E. coli on culture and sensitive to Rocephin. Will discontinue Levaquin and start Rocephin.   Will give dose today and tomorrow morning and if doing better tomorrow will be able to be discharged home to the [facility] on Omnicef 300 mg twice daily x10 days. Will set up home health care to monitor and physical therapy for frequent falls. Orders filled out today. Dementia senile/Alzheimer's- worsened by acute infection, seems better today. Decreased p.o. intake- add Megace today. Extended conference with family at bedside. If better in the morning okay to discharge to the [facility] on 10-day course of ALEN KERR. Home health to evaluate and provide physical therapy services as well. ).    Macario Weldon -add Diflucan/Nystatin today    Antonio Sapp MD  8/14/2020

## 2020-08-15 PROCEDURE — 6370000000 HC RX 637 (ALT 250 FOR IP): Performed by: FAMILY MEDICINE

## 2020-08-15 PROCEDURE — 97110 THERAPEUTIC EXERCISES: CPT

## 2020-08-15 PROCEDURE — 97530 THERAPEUTIC ACTIVITIES: CPT

## 2020-08-15 PROCEDURE — G0378 HOSPITAL OBSERVATION PER HR: HCPCS

## 2020-08-15 PROCEDURE — 97162 PT EVAL MOD COMPLEX 30 MIN: CPT

## 2020-08-15 PROCEDURE — 6370000000 HC RX 637 (ALT 250 FOR IP): Performed by: PHYSICIAN ASSISTANT

## 2020-08-15 PROCEDURE — 2580000003 HC RX 258: Performed by: FAMILY MEDICINE

## 2020-08-15 PROCEDURE — 96376 TX/PRO/DX INJ SAME DRUG ADON: CPT

## 2020-08-15 PROCEDURE — 96372 THER/PROPH/DIAG INJ SC/IM: CPT

## 2020-08-15 PROCEDURE — 6360000002 HC RX W HCPCS: Performed by: FAMILY MEDICINE

## 2020-08-15 RX ORDER — TRAZODONE HYDROCHLORIDE 50 MG/1
50 TABLET ORAL NIGHTLY PRN
Status: DISCONTINUED | OUTPATIENT
Start: 2020-08-15 | End: 2020-08-17 | Stop reason: HOSPADM

## 2020-08-15 RX ADMIN — SODIUM CHLORIDE, PRESERVATIVE FREE 10 ML: 5 INJECTION INTRAVENOUS at 22:09

## 2020-08-15 RX ADMIN — ASPIRIN 81 MG 81 MG: 81 TABLET ORAL at 09:36

## 2020-08-15 RX ADMIN — NYSTATIN 500000 UNITS: 100000 SUSPENSION ORAL at 16:55

## 2020-08-15 RX ADMIN — BUSPIRONE HYDROCHLORIDE 5 MG: 5 TABLET ORAL at 09:37

## 2020-08-15 RX ADMIN — MEMANTINE HYDROCHLORIDE 10 MG: 5 TABLET ORAL at 09:37

## 2020-08-15 RX ADMIN — MEGESTROL ACETATE 400 MG: 40 SUSPENSION ORAL at 09:38

## 2020-08-15 RX ADMIN — MEMANTINE HYDROCHLORIDE 10 MG: 5 TABLET ORAL at 22:08

## 2020-08-15 RX ADMIN — NYSTATIN 500000 UNITS: 100000 SUSPENSION ORAL at 09:38

## 2020-08-15 RX ADMIN — BUSPIRONE HYDROCHLORIDE 5 MG: 5 TABLET ORAL at 22:08

## 2020-08-15 RX ADMIN — DIPHENHYDRAMINE HCL 50 MG: 25 TABLET ORAL at 22:08

## 2020-08-15 RX ADMIN — ENOXAPARIN SODIUM 40 MG: 40 INJECTION SUBCUTANEOUS at 09:38

## 2020-08-15 RX ADMIN — METOPROLOL SUCCINATE 50 MG: 50 TABLET, EXTENDED RELEASE ORAL at 09:37

## 2020-08-15 RX ADMIN — LISINOPRIL 20 MG: 20 TABLET ORAL at 09:37

## 2020-08-15 RX ADMIN — ESCITALOPRAM OXALATE 20 MG: 10 TABLET ORAL at 09:37

## 2020-08-15 RX ADMIN — FLUCONAZOLE 100 MG: 100 TABLET ORAL at 09:37

## 2020-08-15 RX ADMIN — NYSTATIN: 100000 CREAM TOPICAL at 03:50

## 2020-08-15 RX ADMIN — NYSTATIN 500000 UNITS: 100000 SUSPENSION ORAL at 22:09

## 2020-08-15 RX ADMIN — NYSTATIN: 100000 CREAM TOPICAL at 09:36

## 2020-08-15 RX ADMIN — BUSPIRONE HYDROCHLORIDE 5 MG: 5 TABLET ORAL at 14:21

## 2020-08-15 RX ADMIN — NYSTATIN: 100000 CREAM TOPICAL at 22:09

## 2020-08-15 RX ADMIN — NYSTATIN 500000 UNITS: 100000 SUSPENSION ORAL at 14:21

## 2020-08-15 RX ADMIN — ACETAMINOPHEN 650 MG: 325 TABLET, FILM COATED ORAL at 05:25

## 2020-08-15 RX ADMIN — SODIUM CHLORIDE, PRESERVATIVE FREE 10 ML: 5 INJECTION INTRAVENOUS at 09:42

## 2020-08-15 RX ADMIN — CEFTRIAXONE 1 G: 1 INJECTION, POWDER, FOR SOLUTION INTRAMUSCULAR; INTRAVENOUS at 09:39

## 2020-08-15 ASSESSMENT — PAIN SCALES - GENERAL: PAINLEVEL_OUTOF10: 8

## 2020-08-15 NOTE — PROGRESS NOTES
Physical Therapy    Facility/Department: [facility] ONCOLOGY UNIT  Initial Assessment    NAME: Erna Limon  : 3/31/1933  MRN: [id number]    Date of Service: 8/15/2020    Discharge Recommendations:  Continue to assess pending progress, Patient would benefit from continued therapy after discharge        Assessment   Body structures, Functions, Activity limitations: Decreased functional mobility ; Decreased strength;Decreased safe awareness;Decreased cognition;Decreased endurance;Decreased posture;Decreased balance  Assessment: Pt. will benefit from cont. PT during acute care stay. Pt. not able to follow commands well, having difficulty initiating tasks and difficulty performing transfers. Pt. unable to amb. at this time and would be unable to currently return to the [facility]. Antiicpate pt. will need SNF placement to do therapy before she is able to return to prior living situation. She is a fall risk and needs staff A and cathy steady if wanting to get up to the chair. Treatment Diagnosis: impaired gait and mobility  Prognosis: Guarded  Decision Making: Medium Complexity  PT Education: Goals;PT Role;Plan of Care;Family Education;Transfer Training;Functional Mobility Training  Patient Education: use of call light for staff A  Barriers to Learning: confusion and lethargy  REQUIRES PT FOLLOW UP: Yes  Activity Tolerance  Activity Tolerance: Patient limited by fatigue;Patient limited by cognitive status  Activity Tolerance: confusion and lethargy       Patient Diagnosis(es): The primary encounter diagnosis was Acute cystitis without hematuria. A diagnosis of Altered mental status, unspecified altered mental status type was also pertinent to this visit. has a past medical history of Dementia ([address] has no past surgical history on file.     Restrictions  Restrictions/Precautions  Restrictions/Precautions: General Precautions, Fall Risk  Required Braces or Orthoses?: No  Vision/Hearing  Vision: Impaired  Vision Exceptions: Requires cues for all  Sequencing: Requires cues for all  Cognition Comment: pt unable to initiate tasks    Objective     Observation/Palpation  Posture: Poor  Observation: pt. with posterior lean in sitting    PROM RLE (degrees)  RLE PROM: WFL  RLE General PROM: AAROM WFLS  PROM LLE (degrees)  LLE PROM: WFL  LLE General PROM: AAROM WFLS  Strength RLE  Strength RLE: Exception  Comment: grossly 3-/5  Strength LLE  Strength LLE: Exception  Comment: grossly 3-/5        Bed mobility  Rolling to Left: Moderate assistance  Rolling to Right: (pt. rolled to R and L 3 times to change and replace depends. Pt. washed with soapy and then dry washcloth.)  Supine to Sit: Maximum assistance  Sit to Supine: Maximum assistance  Scooting: Dependent/Total;2 Person assistance  Comment: pt. sat on EOB x 20 mins, attempting to stand and working on static and dynamic balance. Pt. needing v cues to lean forward due to posterior LOB. Transfers  Sit to Stand: Moderate Assistance  Stand to sit: Moderate Assistance  Bed to Chair: Unable to assess  Comment: pt. attempted standing 3 times, but would lose focus, close her eyes and begin to fall asleep. Pt. attempted stepping to [facility] twice with Mod A and RW, but was unable to effectively advance BLES and was leaning posteriorly on the bed. Ambulation  Ambulation?: No  WB Status: no restrictions     Balance  Posture: Poor  Sitting - Static: Fair;+  Sitting - Dynamic: Fair;-  Standing - Static: Poor;+  Standing - Dynamic: Poor  Exercises  Comments: LAQ x 10 reps BLEs, pt. needing A with maintaining balance. Plan   Plan  Times per week: 3-7  Times per day: Daily  Plan weeks: 2  Current Treatment Recommendations: Strengthening, Balance Training, Functional Mobility Training, Transfer Training, Endurance Training, Gait Training, Safety Education & Training, Positioning, Equipment Evaluation, Education, & procurement, Patient/Caregiver Education & Training  Plan Comment: cont. PT per POC.   Safety

## 2020-08-15 NOTE — PROGRESS NOTES
Medicine Progress Note 8/15/2020    Patient:  Erna Limon  YOB: 1933  MRN: [id number]     Acct: [de-identified]   Admit date: 8/12/2020    Patient Seen, Chart, Consults notes, Labs, Radiology studies reviewed. Subjective:   No acute issues overnight. Some increased alertness & PO intake. No abd pain, nausea, or vomiting. No fever or chills. Daughter at bedside. Concerned that patient is still confused but more concerned that patient is still physically unsafe to return to assisted living. Daughter requests something to help her rest better. Medications:   Scheduled Meds:   cefTRIAXone  1 g Intravenous Q24H    megestrol acetate  400 mg Oral Daily    fluconazole  100 mg Oral Daily    nystatin  5 mL Oral 4x Daily    nystatin   Topical BID    aspirin  81 mg Oral Daily    memantine  10 mg Oral BID    metoprolol succinate  50 mg Oral Daily    escitalopram  20 mg Oral Daily    vitamin D  50,000 Units Oral Once per day on Mon Thu    lisinopril  20 mg Oral Daily    busPIRone  5 mg Oral TID    sodium chloride flush  10 mL Intravenous 2 times per day    enoxaparin  40 mg Subcutaneous Daily     Continuous Infusions:   sodium chloride 75 mL/hr at 08/13/20 2028     PRN Meds:diphenhydrAMINE **AND** acetaminophen, sodium chloride flush, acetaminophen, ondansetron    Objective:   Vitals:   Patient Vitals for the past 24 hrs:   BP Temp Temp src Pulse Resp SpO2   08/15/20 0612 (!) 160/88 98 °F (36.7 °C) Temporal -- 17 97 %   08/14/20 1907 -- 97.3 °F (36.3 °C) Temporal 61 20 98 %       GENERAL: Awake, alert, in no acute distress. Confused. Alert to self and place. Not time. Does not know who daughter is. CARDIAC: Regular rate and rhythm. No murmurs, rubs, or gallops. RESPIRATORY: Chest is clear to auscultation bilaterally. No wheezes, rales, or rhonchi. ABDOMEN: Normoactive bowel sounds. Abdomen soft, nontender, and nondistended.     EXTREMITIES: No cyanosis, clubbing, or edema.      24 hour intake/output:No intake or output data in the 24 hours ending 08/15/20 1309  Last 3 weights: Wt Readings from Last 3 Encounters:   08/12/20 204 lb 11.2 oz (92.9 kg)       Labs:  Hematology:  Recent Labs     08/12/20  1313   WBC 9.2   HGB 13.9   HCT 42.7        Chemistry:  Recent Labs     08/12/20  1313 08/14/20  0304    143   K 4.2 4.0    109   CO2 29 26   GLUCOSE 108 94   BUN 14 13   CREATININE 1.1* 0.9   ANIONGAP 7 8   LABGLOM 47* 59*   GFRAA 57* >59   CALCIUM 10.2 9.3   TROPONINI <0.01  --      Recent Labs     08/12/20  1313   PROT 6.0*   LABALBU 3.5   AST 12   ALT 6   ALKPHOS 81   BILITOT 0.3       Recent Labs     08/13/20  0816   POCGLU 94       Cultures:  Lab Results   Component Value Date/Time    LABURIN >100,000 CFU/ml  Mixed skin alexys present   (A) 08/12/2020 03:12 PM    LABURIN (A) 08/12/2020 03:12 PM     Heavy growth  Multiple Resistant Drug Organism  CONTACT PRECAUTIONS INDICATED     Ecoli sens to cephalosporins, resistant to TMP-SMX & levo    Assessment/Plan:   Principal Problem: Altered mental status  Active Hospital Problems    Diagnosis Date Noted    Altered mental status [R41.82] 08/13/2020    Diarrhea [R19.7] 08/13/2020    Bradycardia [R00.1] 08/13/2020    Vascular dementia ([facility] [F01.50] 08/13/2020    CKD (chronic kidney disease), stage III ([facility] [N18.3] 08/13/2020    Essential hypertension [I10] 08/13/2020    Palliative care patient [Z51.5] 08/13/2020    UTI (urinary tract infection) [N39.0] 08/12/2020       Continue IV antibiotics. Monitor cultures. If cont doing ok, plan de-escalate tomm to PO. Monitor with serial neuro checks. Low dose trazodone prn for sleep. Encourage PO. Continue megace. Work with PT. Increase activity. Chronic medical conditions otherwise stable. Continue current medications & management.   If mentation improves and does well with PT, poss dc tomm with HH.         Electronically signed by Adelina Onofre MD on 8/15/2020 at 1:09 PM

## 2020-08-15 NOTE — PLAN OF CARE
Problem: Falls - Risk of:  Goal: Will remain free from falls  Description: Will remain free from falls  Outcome: Ongoing  Goal: Absence of physical injury  Description: Absence of physical injury  Outcome: Ongoing     Problem: Skin Integrity:  Goal: Will show no infection signs and symptoms  Description: Will show no infection signs and symptoms  Outcome: Ongoing  Goal: Absence of new skin breakdown  Description: Absence of new skin breakdown  Outcome: Ongoing     Problem: Confusion - Acute:  Goal: Absence of continued neurological deterioration signs and symptoms  Description: Absence of continued neurological deterioration signs and symptoms  Outcome: Ongoing  Goal: Mental status will be restored to baseline  Description: Mental status will be restored to baseline  Outcome: Ongoing     Problem: Discharge Planning:  Goal: Ability to perform activities of daily living will improve  Description: Ability to perform activities of daily living will improve  Outcome: Ongoing  Goal: Participates in care planning  Description: Participates in care planning  Outcome: Ongoing     Problem: Injury - Risk of, Physical Injury:  Goal: Will remain free from falls  Description: Will remain free from falls  Outcome: Ongoing  Goal: Absence of physical injury  Description: Absence of physical injury  Outcome: Ongoing     Problem: Mood - Altered:  Goal: Mood stable  Description: Mood stable  Outcome: Ongoing  Goal: Absence of abusive behavior  Description: Absence of abusive behavior  Outcome: Ongoing  Goal: Verbalizations of feeling emotionally comfortable while being cared for will increase  Description: Verbalizations of feeling emotionally comfortable while being cared for will increase  Outcome: Ongoing     Problem: Psychomotor Activity - Altered:  Goal: Absence of psychomotor disturbance signs and symptoms  Description: Absence of psychomotor disturbance signs and symptoms  Outcome: Ongoing     Problem: Sensory Perception - Impaired:  Goal: Demonstrations of improved sensory functioning will increase  Description: Demonstrations of improved sensory functioning will increase  Outcome: Ongoing  Goal: Decrease in sensory misperception frequency  Description: Decrease in sensory misperception frequency  Outcome: Ongoing  Goal: Able to refrain from responding to false sensory perceptions  Description: Able to refrain from responding to false sensory perceptions  Outcome: Ongoing  Goal: Demonstrates accurate environmental perceptions  Description: Demonstrates accurate environmental perceptions  Outcome: Ongoing  Goal: Able to distinguish between reality-based and nonreality-based thinking  Description: Able to distinguish between reality-based and nonreality-based thinking  Outcome: Ongoing  Goal: Able to interrupt nonreality-based thinking  Description: Able to interrupt nonreality-based thinking  Outcome: Ongoing     Problem: Sleep Pattern Disturbance:  Goal: Appears well-rested  Description: Appears well-rested  Outcome: Ongoing     Problem: Sensory:  Goal: General experience of comfort will improve  Description: General experience of comfort will improve  Outcome: Ongoing     Problem: Urinary Elimination:  Goal: Signs and symptoms of infection will decrease  Description: Signs and symptoms of infection will decrease  Outcome: Ongoing  Goal: Ability to reestablish a normal urinary elimination pattern will improve - after catheter removal  Description: Ability to reestablish a normal urinary elimination pattern will improve  Outcome: Ongoing  Goal: Complications related to the disease process, condition or treatment will be avoided or minimized  Description: Complications related to the disease process, condition or treatment will be avoided or minimized  Outcome: Ongoing

## 2020-08-16 PROCEDURE — 96375 TX/PRO/DX INJ NEW DRUG ADDON: CPT

## 2020-08-16 PROCEDURE — 2580000003 HC RX 258: Performed by: FAMILY MEDICINE

## 2020-08-16 PROCEDURE — 1210000000 HC MED SURG R&B

## 2020-08-16 PROCEDURE — 6360000002 HC RX W HCPCS: Performed by: FAMILY MEDICINE

## 2020-08-16 PROCEDURE — 96372 THER/PROPH/DIAG INJ SC/IM: CPT

## 2020-08-16 PROCEDURE — 6370000000 HC RX 637 (ALT 250 FOR IP): Performed by: PHYSICIAN ASSISTANT

## 2020-08-16 PROCEDURE — 6370000000 HC RX 637 (ALT 250 FOR IP): Performed by: INTERNAL MEDICINE

## 2020-08-16 PROCEDURE — 96376 TX/PRO/DX INJ SAME DRUG ADON: CPT

## 2020-08-16 PROCEDURE — 6370000000 HC RX 637 (ALT 250 FOR IP): Performed by: FAMILY MEDICINE

## 2020-08-16 PROCEDURE — 2500000003 HC RX 250 WO HCPCS: Performed by: INTERNAL MEDICINE

## 2020-08-16 RX ORDER — METOPROLOL TARTRATE 5 MG/5ML
5 INJECTION INTRAVENOUS EVERY 12 HOURS PRN
Status: DISCONTINUED | OUTPATIENT
Start: 2020-08-16 | End: 2020-08-17 | Stop reason: HOSPADM

## 2020-08-16 RX ORDER — CEFDINIR 300 MG/1
300 CAPSULE ORAL EVERY 12 HOURS SCHEDULED
Status: DISCONTINUED | OUTPATIENT
Start: 2020-08-16 | End: 2020-08-17 | Stop reason: HOSPADM

## 2020-08-16 RX ADMIN — METOPROLOL TARTRATE 5 MG: 5 INJECTION, SOLUTION INTRAVENOUS at 02:17

## 2020-08-16 RX ADMIN — ENOXAPARIN SODIUM 40 MG: 40 INJECTION SUBCUTANEOUS at 10:59

## 2020-08-16 RX ADMIN — MEGESTROL ACETATE 400 MG: 40 SUSPENSION ORAL at 10:59

## 2020-08-16 RX ADMIN — SODIUM CHLORIDE, PRESERVATIVE FREE 1 G: 5 INJECTION INTRAVENOUS at 10:59

## 2020-08-16 RX ADMIN — ESCITALOPRAM OXALATE 20 MG: 10 TABLET ORAL at 11:00

## 2020-08-16 RX ADMIN — SODIUM CHLORIDE, PRESERVATIVE FREE 10 ML: 5 INJECTION INTRAVENOUS at 11:04

## 2020-08-16 RX ADMIN — SODIUM CHLORIDE, PRESERVATIVE FREE 10 ML: 5 INJECTION INTRAVENOUS at 22:06

## 2020-08-16 RX ADMIN — CEFDINIR 300 MG: 300 CAPSULE ORAL at 22:05

## 2020-08-16 RX ADMIN — METOPROLOL SUCCINATE 50 MG: 50 TABLET, EXTENDED RELEASE ORAL at 11:03

## 2020-08-16 RX ADMIN — NYSTATIN 500000 UNITS: 100000 SUSPENSION ORAL at 10:59

## 2020-08-16 RX ADMIN — BUSPIRONE HYDROCHLORIDE 5 MG: 5 TABLET ORAL at 22:06

## 2020-08-16 RX ADMIN — ASPIRIN 81 MG 81 MG: 81 TABLET ORAL at 11:00

## 2020-08-16 RX ADMIN — MEMANTINE HYDROCHLORIDE 10 MG: 5 TABLET ORAL at 22:06

## 2020-08-16 RX ADMIN — DIPHENHYDRAMINE HCL 50 MG: 25 TABLET ORAL at 22:06

## 2020-08-16 RX ADMIN — NYSTATIN 500000 UNITS: 100000 SUSPENSION ORAL at 22:05

## 2020-08-16 RX ADMIN — LISINOPRIL 20 MG: 20 TABLET ORAL at 11:03

## 2020-08-16 RX ADMIN — NYSTATIN 500000 UNITS: 100000 SUSPENSION ORAL at 16:36

## 2020-08-16 RX ADMIN — BUSPIRONE HYDROCHLORIDE 5 MG: 5 TABLET ORAL at 11:01

## 2020-08-16 RX ADMIN — NYSTATIN: 100000 CREAM TOPICAL at 10:59

## 2020-08-16 RX ADMIN — MEMANTINE HYDROCHLORIDE 10 MG: 5 TABLET ORAL at 11:01

## 2020-08-16 RX ADMIN — FLUCONAZOLE 100 MG: 100 TABLET ORAL at 11:01

## 2020-08-16 RX ADMIN — NYSTATIN 500000 UNITS: 100000 SUSPENSION ORAL at 12:18

## 2020-08-16 NOTE — PROGRESS NOTES
Physical Therapy  Name: Svetlana Talley  MRN:  [id number]  Date of service:  8/16/2020 08/16/20 0930   General Comment   Comments Person in room with patient wanted pt to stay asleep.         Electronically signed by Kristie Sever, PTA on 8/16/2020 at 9:31 AM

## 2020-08-16 NOTE — PROGRESS NOTES
Medicine Progress Note 8/16/2020    Patient:  Susan Frank  YOB: 1933  MRN: [id number]     Acct: [de-identified]   Admit date: 8/12/2020    Patient Seen, Chart, Consults notes, Labs, Radiology studies reviewed. Subjective:   No acute issues overnight. Continues with notable confusion. Another daughter at bedside. Does know who this daughter is but not any of her other children's names. Not oriented to time or her  name. Daughter concerned that patient is still confused & still physically unsafe to return to assisted living. Medications:   Scheduled Meds:   cefTRIAXone (ROCEPHIN) IV  1 g Intravenous Q24H    megestrol acetate  400 mg Oral Daily    fluconazole  100 mg Oral Daily    nystatin  5 mL Oral 4x Daily    nystatin   Topical BID    aspirin  81 mg Oral Daily    memantine  10 mg Oral BID    metoprolol succinate  50 mg Oral Daily    escitalopram  20 mg Oral Daily    vitamin D  50,000 Units Oral Once per day on Mon Thu    lisinopril  20 mg Oral Daily    busPIRone  5 mg Oral TID    sodium chloride flush  10 mL Intravenous 2 times per day    enoxaparin  40 mg Subcutaneous Daily     Continuous Infusions:   sodium chloride 75 mL/hr at 08/13/20 2028     PRN Meds:metoprolol, traZODone, diphenhydrAMINE **AND** acetaminophen, sodium chloride flush, acetaminophen, ondansetron    Objective:   Vitals:   Patient Vitals for the past 24 hrs:   BP Temp Temp src Pulse Resp SpO2   08/16/20 0716 (!) 207/89 96.8 °F (36 °C) Temporal 52 18 97 %   08/16/20 0330 (!) 158/92 -- -- -- -- --   08/16/20 0216 (!) 198/92 -- -- -- -- --   08/16/20 0211 (!) 160/90 -- -- -- -- --   08/16/20 0026 (!) 220/90 96.7 °F (35.9 °C) Temporal 61 16 94 %   08/15/20 1908 -- 98 °F (36.7 °C) Temporal 103 16 97 %       GENERAL: Awake, alert, in no acute distress. Confused. CARDIAC: Regular rate and rhythm. No murmurs, rubs, or gallops. RESPIRATORY: Chest is clear to auscultation bilaterally.   No wheezes, PO.  Continue megace. Work with PT. Increase activity. Chronic medical conditions otherwise stable. Continue current medications & management.   Dc to [facility] when mentation improves and does well with PT.         Electronically signed by Kamar Christiansen MD on 8/16/2020 at 11:39 AM

## 2020-08-17 VITALS
HEIGHT: 67 IN | SYSTOLIC BLOOD PRESSURE: 130 MMHG | OXYGEN SATURATION: 98 % | HEART RATE: 68 BPM | BODY MASS INDEX: 32.13 KG/M2 | DIASTOLIC BLOOD PRESSURE: 80 MMHG | WEIGHT: 204.7 LBS | TEMPERATURE: 96.5 F | RESPIRATION RATE: 20 BRPM

## 2020-08-17 LAB
ANION GAP SERPL CALCULATED.3IONS-SCNC: 8 MMOL/L (ref 7–19)
BASOPHILS ABSOLUTE: 0 K/UL (ref 0–0.2)
BASOPHILS RELATIVE PERCENT: 0.6 % (ref 0–1)
BUN BLDV-MCNC: 17 MG/DL (ref 8–23)
CALCIUM SERPL-MCNC: 10.2 MG/DL (ref 8.8–10.2)
CHLORIDE BLD-SCNC: 106 MMOL/L (ref 98–111)
CO2: 27 MMOL/L (ref 22–29)
CREAT SERPL-MCNC: 1 MG/DL (ref 0.5–0.9)
EOSINOPHILS ABSOLUTE: 0.2 K/UL (ref 0–0.6)
EOSINOPHILS RELATIVE PERCENT: 2.8 % (ref 0–5)
GFR AFRICAN AMERICAN: >59
GFR NON-AFRICAN AMERICAN: 52
GLUCOSE BLD-MCNC: 88 MG/DL (ref 70–99)
GLUCOSE BLD-MCNC: 96 MG/DL (ref 74–109)
HCT VFR BLD CALC: 42.7 % (ref 37–47)
HEMOGLOBIN: 14 G/DL (ref 12–16)
IMMATURE GRANULOCYTES #: 0 K/UL
LYMPHOCYTES ABSOLUTE: 2.6 K/UL (ref 1.1–4.5)
LYMPHOCYTES RELATIVE PERCENT: 36.9 % (ref 20–40)
MCH RBC QN AUTO: 29.9 PG (ref 27–31)
MCHC RBC AUTO-ENTMCNC: 32.8 G/DL (ref 33–37)
MCV RBC AUTO: 91 FL (ref 81–99)
MONOCYTES ABSOLUTE: 0.5 K/UL (ref 0–0.9)
MONOCYTES RELATIVE PERCENT: 7.3 % (ref 0–10)
NEUTROPHILS ABSOLUTE: 3.7 K/UL (ref 1.5–7.5)
NEUTROPHILS RELATIVE PERCENT: 52.1 % (ref 50–65)
PDW BLD-RTO: 12.5 % (ref 11.5–14.5)
PERFORMED ON: NORMAL
PLATELET # BLD: 208 K/UL (ref 130–400)
PMV BLD AUTO: 10.9 FL (ref 9.4–12.3)
POTASSIUM SERPL-SCNC: 3.6 MMOL/L (ref 3.5–5)
RBC # BLD: 4.69 M/UL (ref 4.2–5.4)
SODIUM BLD-SCNC: 141 MMOL/L (ref 136–145)
WBC # BLD: 7.1 K/UL (ref 4.8–10.8)

## 2020-08-17 PROCEDURE — 36415 COLL VENOUS BLD VENIPUNCTURE: CPT

## 2020-08-17 PROCEDURE — 6370000000 HC RX 637 (ALT 250 FOR IP): Performed by: INTERNAL MEDICINE

## 2020-08-17 PROCEDURE — 85025 COMPLETE CBC W/AUTO DIFF WBC: CPT

## 2020-08-17 PROCEDURE — 6370000000 HC RX 637 (ALT 250 FOR IP): Performed by: PHYSICIAN ASSISTANT

## 2020-08-17 PROCEDURE — 2580000003 HC RX 258: Performed by: FAMILY MEDICINE

## 2020-08-17 PROCEDURE — 97116 GAIT TRAINING THERAPY: CPT

## 2020-08-17 PROCEDURE — 6370000000 HC RX 637 (ALT 250 FOR IP): Performed by: FAMILY MEDICINE

## 2020-08-17 PROCEDURE — 82947 ASSAY GLUCOSE BLOOD QUANT: CPT

## 2020-08-17 PROCEDURE — 80048 BASIC METABOLIC PNL TOTAL CA: CPT

## 2020-08-17 PROCEDURE — 97530 THERAPEUTIC ACTIVITIES: CPT

## 2020-08-17 RX ORDER — AMLODIPINE BESYLATE 5 MG/1
5 TABLET ORAL DAILY
Qty: 30 TABLET | Refills: 3 | Status: ON HOLD | OUTPATIENT
Start: 2020-08-17 | End: 2021-08-19

## 2020-08-17 RX ORDER — AMLODIPINE BESYLATE 5 MG/1
5 TABLET ORAL DAILY
Status: DISCONTINUED | OUTPATIENT
Start: 2020-08-17 | End: 2020-08-17 | Stop reason: HOSPADM

## 2020-08-17 RX ORDER — MEGESTROL ACETATE 40 MG/ML
400 SUSPENSION ORAL DAILY
Qty: 600 ML | Refills: 0 | Status: ON HOLD | OUTPATIENT
Start: 2020-08-17 | End: 2020-10-23

## 2020-08-17 RX ORDER — CEFDINIR 300 MG/1
300 CAPSULE ORAL EVERY 12 HOURS SCHEDULED
Qty: 20 CAPSULE | Refills: 0 | Status: SHIPPED | OUTPATIENT
Start: 2020-08-17 | End: 2020-08-27

## 2020-08-17 RX ORDER — ACETAMINOPHEN 500 MG
500 TABLET ORAL NIGHTLY PRN
Qty: 120 TABLET | Refills: 3 | Status: SHIPPED | OUTPATIENT
Start: 2020-08-17

## 2020-08-17 RX ADMIN — MEMANTINE HYDROCHLORIDE 10 MG: 5 TABLET ORAL at 10:21

## 2020-08-17 RX ADMIN — NYSTATIN 500000 UNITS: 100000 SUSPENSION ORAL at 10:21

## 2020-08-17 RX ADMIN — BUSPIRONE HYDROCHLORIDE 5 MG: 5 TABLET ORAL at 10:20

## 2020-08-17 RX ADMIN — LISINOPRIL 20 MG: 20 TABLET ORAL at 10:20

## 2020-08-17 RX ADMIN — METOPROLOL SUCCINATE 50 MG: 50 TABLET, EXTENDED RELEASE ORAL at 10:21

## 2020-08-17 RX ADMIN — CEFDINIR 300 MG: 300 CAPSULE ORAL at 10:20

## 2020-08-17 RX ADMIN — SODIUM CHLORIDE, PRESERVATIVE FREE 10 ML: 5 INJECTION INTRAVENOUS at 10:21

## 2020-08-17 RX ADMIN — AMLODIPINE BESYLATE 5 MG: 5 TABLET ORAL at 10:20

## 2020-08-17 RX ADMIN — ASPIRIN 81 MG 81 MG: 81 TABLET ORAL at 10:20

## 2020-08-17 RX ADMIN — NYSTATIN: 100000 CREAM TOPICAL at 10:23

## 2020-08-17 RX ADMIN — ESCITALOPRAM OXALATE 20 MG: 10 TABLET ORAL at 10:20

## 2020-08-17 RX ADMIN — MEGESTROL ACETATE 400 MG: 40 SUSPENSION ORAL at 10:21

## 2020-08-17 NOTE — CARE COORDINATION
CM advised by PTA, patient would need SNF at discharge. CM met with patient and Og Mcallister, daughter, at the bedside. Patient does have Dementia at baseline. She is not able to give daughter's name. Per Og Mcallister, patient is ambulatory at [facility]. Og Esvin is agreeable to pursuing SNF referrals at [facility].    Electronically signed by Boyd Rodríguez RN on 8/17/2020 at 12:50 PM

## 2020-08-17 NOTE — CARE COORDINATION
[facility] notified of patient discharge today and CALEB orders. DC Summary and DC med list faxed.   Electronically signed by Boston Escalante RN on 8/17/20 at 2:41 PM CDT

## 2020-08-17 NOTE — DISCHARGE SUMMARY
Hospital Discharge Summary    Oxana Oneil  :  3/31/1933  MRN:  [id number]    Admit date:  2020  Discharge date:  2020    Admitting Physician:    Antonio Sapp MD    Discharge Diagnoses:    Principal Problem:    Altered mental status  Active Problems:    UTI (urinary tract infection)    Diarrhea    Bradycardia    Vascular dementia ([address]    CKD (chronic kidney disease), stage III [facility])    Essential hypertension    Palliative care patient    [facility] Course:     Josias a 80 y. o. female who presented to the emergency department for altered mental status at noon yesterday. Chauncey Merino patient lives at the [facility].  A couple weeks ago she had some confusion and was diagnosed with a urinary tract infection and took a 10-day course of antibiotics from [facility].  This past week she had some return of her symptoms and was called in Eddie from our office. Yesterday her family went to go check on her found her very disoriented and weak unable to ambulate or feed herself so called an ambulance and had her brought here for evaluation.  No history of falls or trauma.    Family relates she has had decrease appetite. They have encouraged her to drink more fluids over the past couple weeks. The patient complains of altered taste sensation. She has been having some diarrhea as well nonbloody and is been difficult for them to get a clean urine sample and the plan was to have a repeat cath specimen by home health however her and the nurse both decided she should come to the hospital for evaluation.    Family is at bedside this a.m. Patient states she is feeling better. She seems more alert and back to her baseline. Extended family conference held. The patient was admitted for the above noted medical/surgical issues. Please see daily progress note for further details concerning their stay.  The patient improved throughout their stay and reached maximum medical improvement on the day of discharge. The patient/family agree with the treatment plan as outlined above. All questions concerning their stay were answered prior to discharge. They understand the importance of follow up concerning any abnormal test results.      Discharge Medications:       Salbador Coronel   Home Medication Instructions XVR:[id number]    Printed on:08/17/20 8268   Medication Information           Clonidine 0.1 mg 1 p.o. every 6 hours as needed for blood pressure greater than 160/90           acetaminophen (TYLENOL) 500 MG tablet  Take 1 tablet by mouth nightly as needed (sleep)             amLODIPine (NORVASC) 5 MG tablet  Take 1 tablet by mouth daily             aspirin 81 MG chewable tablet  Take 81 mg by mouth daily             benazepril (LOTENSIN) 40 MG tablet  Take 40 mg by mouth daily             busPIRone (BUSPAR) 5 MG tablet  Take 5 mg by mouth 3 times daily             cefdinir (OMNICEF) 300 MG capsule  Take 1 capsule by mouth every 12 hours for 10 days             diphenhydrAMINE (BENADRYL) 50 MG tablet  Take 1 tablet by mouth nightly as needed for Sleep             Ergocalciferol (VITAMIN D2 PO)  Take 50,000 Units by mouth twice a week On Sunday and Thursday             escitalopram (LEXAPRO) 20 MG tablet  Take 20 mg by mouth daily             megestrol acetate (MEGACE) 400 MG/10ML SUSP  Take 10 mLs by mouth daily             memantine ER (NAMENDA XR) 28 MG CP24 extended release capsule  Take 28 mg by mouth daily             metoprolol succinate (TOPROL XL) 50 MG extended release tablet  Take 50 mg by mouth daily             nystatin (MYCOSTATIN) 461641 UNIT/ML suspension  Take 5 mLs by mouth 4 times daily for 3 days                 Consults:   Palliative care consult  Physical therapy    Significant Diagnostic Studies:    See summary of labs/x-rays/path report for further details    Treatments:     IV antibiotic switch to oral antibiotics    Disposition:   Stable  Follow up with Hoa chung physician assistant 1 week    Signed:  Quentin Srinivasan   8/17/2020, 7:22 AM       Medically stable for discharge to [facility]. E. coli on urine culture sensitive to LOWRY CIRILO. Follow-up within the week for hospital follow-up appointment with RICHY Lopez and can do via tele-med.

## 2020-08-17 NOTE — DISCHARGE INSTR - COC
Continuity of Care Form    Patient Name: Laura Salvador   :  3/31/1933  MRN:  [id number]    Admit date:  2020  Discharge date:  ***    Code Status Order: [facility]   Advance Directives:   [facility] Documentation     Date/Time Healthcare Directive Type of Healthcare Directive Copy in [facility] [address] Agent's Name Healthcare Agent's Phone Number    [phone]  Yes, patient has an advance directive for healthcare treatment  Durable power of  for health care  No, copy requested from family  --  --  --          Admitting Physician:  Concetta Haq MD  PCP: No primary care provider on file. Discharging Nurse: [facility] Unit/Room#: 0430/430-01  Discharging Unit Phone Number: ***    Emergency Contact:   Extended Emergency Contact Information  Primary Emergency Contact: Geri Coleman [address] Phone: [phone]  Mobile Phone: [phone]  Relation: Child  Secondary Emergency Contact: Lisbet Valerie [address] Phone: [phone]  Mobile Phone: [phone]  Relation: Unknown    Past Surgical History:  No past surgical history on file. Immunization History: There is no immunization history on file for this patient. Active Problems:  Patient Active Problem List   Diagnosis Code    UTI (urinary tract infection) N39.0    Altered mental status R41.82    Diarrhea R19.7    Bradycardia R00.1    Vascular dementia [address] [address] F01.50    CKD (chronic kidney disease), stage III ([facility] [address] N18.3    Essential hypertension I10    Palliative care patient Z51.5       Isolation/Infection:   Isolation          Contact        Unreconciled External Infections     Infection Onset Last Indicated Last Received Source    No mapped external infections found    .     Unmapped Infections (1)      C.difficile (rule out) 20             Patient Infection Status     Infection Onset Added Last Indicated Last Indicated By Review Planned Expiration Resolved Resolved By    MDRO (multi-drug resistant organism) 08/12/20 08/14/20 08/12/20 Culture, Urine        Resolved    C-diff Rule Out 08/12/20 08/12/20 08/12/20 Clostridium Difficile Toxin/Antigen (Ordered)   08/15/20 Beth Cox RN    CDIFF order discontinued by provider.           Nurse Assessment:  Last Vital Signs: /80   Pulse 68   Temp 96.5 °F (35.8 °C)   Resp 20   Ht 5' 7\" (1.702 m)   Wt 204 lb 11.2 oz (92.9 kg)   SpO2 98%   BMI 32.06 kg/m²     Last documented pain score (0-10 scale): Pain Level: 8  Last Weight:   Wt Readings from Last 1 Encounters:   08/12/20 204 lb 11.2 oz (92.9 kg)     Mental Status:  {IP PT MENTAL STATUS:20030}    IV Access:  { JENNIFER IV ACCESS:426618218}    Nursing Mobility/ADLs:  Walking   {CHP DME LJQA:975103929}  Transfer  {P DME GNVL:837612471}  Bathing  {CHP DME HDBL:953626175}  Dressing  {CHP DME FFKO:726038396}  Toileting  {CHP DME WPES:139795265}  Feeding  {CHP DME PTOZ:737169675}  Med Admin  {P DME BSVO:830521817}  Med Delivery   { JENNIFER MED Delivery:687996970}    Wound Care Documentation and Therapy:        Elimination:  Continence:   · Bowel: {YES / RT:99818}  · Bladder: {YES / OP:61560}  Urinary Catheter: {Urinary Catheter:027929381}   Colostomy/Ileostomy/Ileal Conduit: {YES / QK:91446}       Date of Last BM: ***    Intake/Output Summary (Last 24 hours) at 8/17/2020 1219  Last data filed at 8/17/2020 6156  Gross per 24 hour   Intake --   Output 1000 ml   Net -1000 ml     I/O last 3 completed shifts:  In: -   Out: 1000 [Urine:1000]    Safety Concerns:     [address] Safety Concerns:973116599}    Impairments/Disabilities:      [address] Impairments/Disabilities:384942215}    Nutrition Therapy:  Current Nutrition Therapy:   [address] JENNIFER Diet List:480451486}    Routes of Feeding: {CHP DME Other Feedings:384725806}  Liquids: {Slp liquid thickness:15864}  Daily Fluid Restriction: {CHP DME Yes amt example:504778825}  Last

## 2020-08-17 NOTE — PROGRESS NOTES
08/17/20 0943   Restrictions/Precautions   Restrictions/Precautions General Precautions; Fall Risk   General   Chart Reviewed Yes   Family / Caregiver Present Yes   Subjective   Subjective Patient in bed agrees to therapy   Pain Screening   Patient Currently in Pain Yes   Intervention List Patient able to continue with treatment   Oxygen Therapy   O2 Device None (Room air)   Orientation   Overall Orientation Status X   Orientation Level Oriented to person;Disoriented to place; Disoriented to time   Bed Mobility   Supine to Sit Moderate assistance  (with HOB and BR down , similar to home bed)   Scooting Dependent/Total   Transfers   Sit to Stand Maximum Assistance; Moderate Assistance   Stand to sit Moderate Assistance   Bed to Chair Moderate assistance   Comment Patient has poor standing balance posterior lean   Ambulation   Ambulation? Yes   Ambulation 1   Device Rolling Walker   Other Apparatus Wheelchair follow   Assistance Moderate assistance   Quality of Gait Poor unsteady poor balance   Gait Deviations Slow Laura;Decreased step length;Shuffles   Distance 6'   Comments Gait stopped due to poor balance patient sat in chair   Balance   Posture Poor   Sitting - Static Fair;-   Sitting - Dynamic Fair;-   Standing - Static Poor   Standing - Dynamic Poor   Activity Tolerance   Activity Tolerance Patient limited by fatigue;Patient limited by endurance; Patient limited by cognitive status   Safety Devices   Type of devices Left in chair;Chair alarm in place;Call light within reach  (Daughter present)   Physical Therapy    Electronically signed by Parag Staley PTA on 8/17/2020 at 9:51 AM

## 2020-09-10 LAB
BACTERIA: ABNORMAL /HPF
BILIRUBIN URINE: NEGATIVE
BLOOD, URINE: NEGATIVE
CLARITY: ABNORMAL
COLOR: YELLOW
EPITHELIAL CELLS, UA: ABNORMAL /HPF
GLUCOSE URINE: NEGATIVE MG/DL
KETONES, URINE: NEGATIVE MG/DL
LEUKOCYTE ESTERASE, URINE: ABNORMAL
NITRITE, URINE: POSITIVE
PH UA: 6 (ref 5–8)
PROTEIN UA: ABNORMAL MG/DL
RBC UA: ABNORMAL /HPF (ref 0–2)
SPECIFIC GRAVITY UA: 1.02 (ref 1–1.03)
UROBILINOGEN, URINE: 1 E.U./DL
WBC UA: ABNORMAL /HPF (ref 0–5)

## 2020-09-11 ENCOUNTER — HOSPITAL ENCOUNTER (EMERGENCY)
Facility: HOSPITAL | Age: 85
Discharge: HOME | End: 2020-09-11
Attending: EMERGENCY MEDICINE | Admitting: EMERGENCY MEDICINE

## 2020-09-11 ENCOUNTER — APPOINTMENT (OUTPATIENT)
Dept: GENERAL RADIOLOGY | Facility: HOSPITAL | Age: 85
End: 2020-09-11

## 2020-09-11 VITALS
SYSTOLIC BLOOD PRESSURE: 185 MMHG | WEIGHT: 198.4 LBS | OXYGEN SATURATION: 99 % | DIASTOLIC BLOOD PRESSURE: 63 MMHG | BODY MASS INDEX: 33.02 KG/M2 | TEMPERATURE: 98.3 F | HEART RATE: 56 BPM | RESPIRATION RATE: 16 BRPM

## 2020-09-11 DIAGNOSIS — N39.0 ACUTE UTI: Primary | ICD-10-CM

## 2020-09-11 LAB
ALBUMIN SERPL-MCNC: 3.8 G/DL (ref 3.5–5.2)
ALBUMIN/GLOB SERPL: 1.5 G/DL
ALP SERPL-CCNC: 88 U/L (ref 39–117)
ALT SERPL W P-5'-P-CCNC: 9 U/L (ref 1–33)
ANION GAP SERPL CALCULATED.3IONS-SCNC: 8 MMOL/L (ref 5–15)
AST SERPL-CCNC: 14 U/L (ref 1–32)
BASOPHILS # BLD AUTO: 0.04 10*3/MM3 (ref 0–0.2)
BASOPHILS NFR BLD AUTO: 0.5 % (ref 0–1.5)
BILIRUB SERPL-MCNC: 0.5 MG/DL (ref 0–1.2)
BUN SERPL-MCNC: 14 MG/DL (ref 8–23)
BUN/CREAT SERPL: 14.1 (ref 7–25)
CALCIUM SPEC-SCNC: 10.2 MG/DL (ref 8.6–10.5)
CHLORIDE SERPL-SCNC: 111 MMOL/L (ref 98–107)
CO2 SERPL-SCNC: 26 MMOL/L (ref 22–29)
CREAT SERPL-MCNC: 0.99 MG/DL (ref 0.57–1)
D-LACTATE SERPL-SCNC: 0.9 MMOL/L (ref 0.5–2)
DEPRECATED RDW RBC AUTO: 39.8 FL (ref 37–54)
EOSINOPHIL # BLD AUTO: 0.16 10*3/MM3 (ref 0–0.4)
EOSINOPHIL NFR BLD AUTO: 2.2 % (ref 0.3–6.2)
ERYTHROCYTE [DISTWIDTH] IN BLOOD BY AUTOMATED COUNT: 12.4 % (ref 12.3–15.4)
GFR SERPL CREATININE-BSD FRML MDRD: 53 ML/MIN/1.73
GLOBULIN UR ELPH-MCNC: 2.6 GM/DL
GLUCOSE SERPL-MCNC: 102 MG/DL (ref 65–99)
HCT VFR BLD AUTO: 41.3 % (ref 34–46.6)
HGB BLD-MCNC: 13.9 G/DL (ref 12–15.9)
HOLD SPECIMEN: NORMAL
HOLD SPECIMEN: NORMAL
IMM GRANULOCYTES # BLD AUTO: 0.02 10*3/MM3 (ref 0–0.05)
IMM GRANULOCYTES NFR BLD AUTO: 0.3 % (ref 0–0.5)
LYMPHOCYTES # BLD AUTO: 1.84 10*3/MM3 (ref 0.7–3.1)
LYMPHOCYTES NFR BLD AUTO: 24.9 % (ref 19.6–45.3)
MCH RBC QN AUTO: 29.4 PG (ref 26.6–33)
MCHC RBC AUTO-ENTMCNC: 33.7 G/DL (ref 31.5–35.7)
MCV RBC AUTO: 87.5 FL (ref 79–97)
MONOCYTES # BLD AUTO: 0.4 10*3/MM3 (ref 0.1–0.9)
MONOCYTES NFR BLD AUTO: 5.4 % (ref 5–12)
NEUTROPHILS NFR BLD AUTO: 4.92 10*3/MM3 (ref 1.7–7)
NEUTROPHILS NFR BLD AUTO: 66.7 % (ref 42.7–76)
NRBC BLD AUTO-RTO: 0 /100 WBC (ref 0–0.2)
PLATELET # BLD AUTO: 218 10*3/MM3 (ref 140–450)
PMV BLD AUTO: 10.2 FL (ref 6–12)
POTASSIUM SERPL-SCNC: 3.5 MMOL/L (ref 3.5–5.2)
PROT SERPL-MCNC: 6.4 G/DL (ref 6–8.5)
RBC # BLD AUTO: 4.72 10*6/MM3 (ref 3.77–5.28)
SODIUM SERPL-SCNC: 145 MMOL/L (ref 136–145)
WBC # BLD AUTO: 7.38 10*3/MM3 (ref 3.4–10.8)
WHOLE BLOOD HOLD SPECIMEN: NORMAL
WHOLE BLOOD HOLD SPECIMEN: NORMAL

## 2020-09-11 PROCEDURE — 80053 COMPREHEN METABOLIC PANEL: CPT | Performed by: EMERGENCY MEDICINE

## 2020-09-11 PROCEDURE — 25010000002 CEFTRIAXONE PER 250 MG: Performed by: EMERGENCY MEDICINE

## 2020-09-11 PROCEDURE — 25010000003 LIDOCAINE 1 % SOLUTION 20 ML VIAL: Performed by: EMERGENCY MEDICINE

## 2020-09-11 PROCEDURE — 96372 THER/PROPH/DIAG INJ SC/IM: CPT

## 2020-09-11 PROCEDURE — 71045 X-RAY EXAM CHEST 1 VIEW: CPT

## 2020-09-11 PROCEDURE — 83605 ASSAY OF LACTIC ACID: CPT | Performed by: EMERGENCY MEDICINE

## 2020-09-11 PROCEDURE — 99284 EMERGENCY DEPT VISIT MOD MDM: CPT

## 2020-09-11 PROCEDURE — 87040 BLOOD CULTURE FOR BACTERIA: CPT | Performed by: EMERGENCY MEDICINE

## 2020-09-11 PROCEDURE — 85025 COMPLETE CBC W/AUTO DIFF WBC: CPT | Performed by: EMERGENCY MEDICINE

## 2020-09-11 RX ORDER — SODIUM CHLORIDE 0.9 % (FLUSH) 0.9 %
10 SYRINGE (ML) INJECTION AS NEEDED
Status: DISCONTINUED | OUTPATIENT
Start: 2020-09-11 | End: 2020-09-11 | Stop reason: HOSPADM

## 2020-09-11 RX ORDER — CEFDINIR 300 MG/1
300 CAPSULE ORAL 2 TIMES DAILY
Qty: 20 CAPSULE | Refills: 0 | Status: ON HOLD | OUTPATIENT
Start: 2020-09-11 | End: 2020-01-01

## 2020-09-11 RX ADMIN — CEFTRIAXONE SODIUM 1 G: 1 INJECTION, POWDER, FOR SOLUTION INTRAMUSCULAR; INTRAVENOUS at 19:13

## 2020-09-11 NOTE — ED PROVIDER NOTES
"Subjective   Patient is sent from the assisted living facility with a complaint to the EMS who told us that she has \"E. coli in her urine\".  We did not have report initially but when called back with they told us that she had seem to be a little more altered than usual even though she does have significant dementia this morning.  They had urine was collected by home health yesterday they did reveal a UTI and they got a culture today that said she had E. coli in her urine so they sent her here to be evaluated.      History provided by:  EMS personnel and nursing home   used: No    Other   Location:  [facility]  Quality:  \"e. coli in urine\"  Severity:  Mild  Onset quality:  Sudden  Duration:  1 day  Timing:  Constant  Progression:  Unchanged  Chronicity:  New  Associated symptoms: no abdominal pain, no chest pain, no cough, no diarrhea, no ear pain, no fatigue, no headaches, no loss of consciousness, no nausea, no rash, no shortness of breath, no sore throat and no vomiting        Review of Systems   Constitutional: Negative.  Negative for fatigue.   HENT: Negative.  Negative for ear pain and sore throat.    Respiratory: Negative.  Negative for cough and shortness of breath.    Cardiovascular: Negative.  Negative for chest pain.   Gastrointestinal: Negative.  Negative for abdominal pain, diarrhea, nausea and vomiting.   Genitourinary: Positive for dysuria.   Musculoskeletal: Negative.    Skin: Negative.  Negative for rash.   Neurological: Negative.  Negative for loss of consciousness and headaches.   Psychiatric/Behavioral: Negative.    All other systems reviewed and are negative.      Past Medical History:   Diagnosis Date   • Dementia (CMS/HCC)    • Hypertension        Allergies   Allergen Reactions   • Sulfa Antibiotics Unknown - High Severity       Past Surgical History:   Procedure Laterality Date   • HYSTERECTOMY     • KNEE MENISCAL REPAIR         No family history on file.    Social " History     Socioeconomic History   • Marital status:      Spouse name: Not on file   • Number of children: Not on file   • Years of education: Not on file   • Highest education level: Not on file   Tobacco Use   • Smoking status: Never Smoker   • Smokeless tobacco: Never Used   Substance and Sexual Activity   • Alcohol use: Never     Frequency: Never   • Drug use: Never   • Sexual activity: Defer       Prior to Admission medications    Medication Sig Start Date End Date Taking? Authorizing Provider   amLODIPine-benazepril (LOTREL 5-20) 5-20 MG per capsule Take 1 capsule by mouth Daily. 1/9/20   Dmitriy Vargas DO   azithromycin (ZITHROMAX Z-MIRLANDE) 250 MG tablet Take 2 tablets the first day, then 1 tablet daily for 4 days. 12/16/19   Dmitriy Vargas DO   benzonatate (TESSALON PERLES) 100 MG capsule Take 1 capsule by mouth 3 (Three) Times a Day As Needed for Cough. 1/14/20   Dmitriy Vargas DO   busPIRone (BUSPAR) 15 MG tablet Take 15 mg by mouth 3 (Three) Times a Day. 12/25/19   Provider, MD Delilah   busPIRone (BUSPAR) 5 MG tablet Take 1 tablet by mouth 3 (Three) Times a Day. 7/13/20   Dmitriy Vargas DO   cefdinir (OMNICEF) 300 MG capsule Take 1 capsule by mouth 2 (Two) Times a Day. 12/20/19   Bharti Nolan APRN   doxycycline (ADOXA) 100 MG tablet Take 1 tablet by mouth 2 (Two) Times a Day. 1/14/20   Dmitriy Vargas DO   ergocalciferol (ERGOCALCIFEROL) 1.25 MG (27566 UT) capsule Take 1 capsule by mouth 2 (Two) Times a Week. 1/9/20 1/8/21  Dmitriy Vargas DO   escitalopram (LEXAPRO) 20 MG tablet Take 1 tablet by mouth Daily. 1/9/20   Dmitriy Vargas DO   fexofenadine (ALLEGRA ALLERGY) 180 MG tablet Take 1 tablet by mouth Daily. 1/14/20   Dmitriy Vargas DO   furosemide (LASIX) 20 MG tablet Take 1 tablet by mouth Daily. 1/9/20   Dmitriy Vargas DO   memantine (NAMENDA XR) 28 MG capsule sustained-release 24 hr extended release capsule Take 1 capsule by mouth  Daily. 1/13/20   Dmitriy Vargas DO   metoprolol succinate XL (TOPROL-XL) 50 MG 24 hr tablet Take 1 tablet by mouth Daily. 1/9/20   Dmitriy Vargas DO   traZODone (DESYREL) 50 MG tablet Take 1 tablet by mouth Every Night. 1/27/20   Dmitriy Vargas DO       Medications   sodium chloride 0.9 % flush 10 mL (has no administration in time range)   cefTRIAXone (ROCEPHIN) 1 g/100 mL 0.9% NS (MBP) (has no administration in time range)       Vitals:    09/11/20 1800   BP: (!) 185/74   Pulse:    Resp:    Temp:    SpO2:          Objective   Physical Exam   Constitutional: She appears well-developed and well-nourished.   HENT:   Head: Normocephalic and atraumatic.   Neck: Normal range of motion. Neck supple.   Cardiovascular: Normal rate and regular rhythm.   Pulmonary/Chest: Effort normal and breath sounds normal.   Abdominal: Soft. Bowel sounds are normal.   Musculoskeletal: Normal range of motion.   Neurological: She is alert.   Patient is confused but cooperative.   Psychiatric: She has a normal mood and affect. Her behavior is normal.   Nursing note and vitals reviewed.      Procedures         Lab Results (last 24 hours)     Procedure Component Value Units Date/Time    Blood Culture - Blood, Hand, Right [[id number]] Collected:  09/11/20 1700    Specimen:  Blood from Hand, Right Updated:  09/11/20 1718    CBC & Differential [[id number]] Collected:  09/11/20 1720    Specimen:  Blood from Arm, Left Updated:  09/11/20 1733    Narrative:       The following orders were created for panel order CBC & Differential.  Procedure                               Abnormality         Status                     ---------                               -----------         ------                     CBC Auto Differential[[id number]]        Normal              Final result                 Please view results for these tests on the individual orders.    Comprehensive Metabolic Panel [[id number]]  (Abnormal) Collected:  09/11/20 1720     Specimen:  Blood from Arm, Left Updated:  09/11/20 1749     Glucose 102 mg/dL      BUN 14 mg/dL      Creatinine 0.99 mg/dL      Sodium 145 mmol/L      Potassium 3.5 mmol/L      Chloride 111 mmol/L      CO2 26.0 mmol/L      Calcium 10.2 mg/dL      Total Protein 6.4 g/dL      Albumin 3.80 g/dL      ALT (SGPT) 9 U/L      AST (SGOT) 14 U/L      Alkaline Phosphatase 88 U/L      Total Bilirubin 0.5 mg/dL      eGFR Non African Amer 53 mL/min/1.73      Globulin 2.6 gm/dL      A/G Ratio 1.5 g/dL      BUN/Creatinine Ratio 14.1     Anion Gap 8.0 mmol/L     Narrative:       GFR Normal >60  Chronic Kidney Disease <60  Kidney Failure <15      Blood Culture - Blood, Arm, Left [[id number]] Collected:  09/11/20 1720    Specimen:  Blood from Arm, Left Updated:  09/11/20 1732    Lactic Acid, Plasma [[id number]]  (Normal) Collected:  09/11/20 1720    Specimen:  Blood from Arm, Left Updated:  09/11/20 1746     Lactate 0.9 mmol/L     CBC Auto Differential [[id number]]  (Normal) Collected:  09/11/20 1720    Specimen:  Blood from Arm, Left Updated:  09/11/20 1733     WBC 7.38 10*3/mm3      RBC 4.72 10*6/mm3      Hemoglobin 13.9 g/dL      Hematocrit 41.3 %      MCV 87.5 fL      MCH 29.4 pg      MCHC 33.7 g/dL      RDW 12.4 %      RDW-SD 39.8 fl      MPV 10.2 fL      Platelets 218 10*3/mm3      Neutrophil % 66.7 %      Lymphocyte % 24.9 %      Monocyte % 5.4 %      Eosinophil % 2.2 %      Basophil % 0.5 %      Immature Grans % 0.3 %      Neutrophils, Absolute 4.92 10*3/mm3      Lymphocytes, Absolute 1.84 10*3/mm3      Monocytes, Absolute 0.40 10*3/mm3      Eosinophils, Absolute 0.16 10*3/mm3      Basophils, Absolute 0.04 10*3/mm3      Immature Grans, Absolute 0.02 10*3/mm3      nRBC 0.0 /100 WBC           XR Chest 1 View   Final Result   1. No acute cardiopulmonary process.       This report was finalized on 09/11/2020 16:39 by Dr. Callum Mojica MD.          ED Course  ED Course as of Sep 11 1844   Fri Sep 11, 2020   1843 I told the patient's  daughter that her she does have urinary tract infection.  We finally got the culture results from Nancy and it turns out it is sensitive to cephalosporins so that so we will start her on.  By now there is no signs of overwhelming infection so she should be safely treated as an outpatient.  She is discharged in stable condition.    [TR]      ED Course User Index  [TR] Valdemar Villegas Jr., MD          MDM  Number of Diagnoses or Management Options  Acute UTI: new and requires workup     Amount and/or Complexity of Data Reviewed  Clinical lab tests: ordered and reviewed  Tests in the radiology section of CPT®: ordered and reviewed    Risk of Complications, Morbidity, and/or Mortality  Presenting problems: moderate  Diagnostic procedures: moderate  Management options: moderate    Patient Progress  Patient progress: stable      Final diagnoses:   Acute UTI          Valdemar Villegas Jr., MD  09/11/20 1417

## 2020-09-11 NOTE — ED NOTES
Unsuccessful attempt to collect labs to right hand and right AC.  Phlebotomy notified     Les Chavez RN  09/11/20 1686

## 2020-09-12 NOTE — ED NOTES
Pt incontinent of urine.  Pt cleansed of incontinent urine and placed in cleaned gown per oRm ROUSE and Les Good RN, RN  09/11/20 1954

## 2020-09-12 NOTE — ED NOTES
Rom ROUSE spoke with daughter Carla Alcantar.  Daughter states pt recently treated for ecoli urine at [facility]. [facility] home health rounded on her today and pt acting more confused than usual.  Dr. Villegas updated.     Les Chavez RN  09/11/20 1952

## 2020-09-13 LAB
ORGANISM: ABNORMAL
ORGANISM: ABNORMAL
URINE CULTURE, ROUTINE: ABNORMAL

## 2020-09-16 ENCOUNTER — TELEPHONE (OUTPATIENT)
Dept: INTERNAL MEDICINE | Facility: CLINIC | Age: 85
End: 2020-09-16

## 2020-09-16 LAB
BACTERIA SPEC AEROBE CULT: NORMAL
BACTERIA SPEC AEROBE CULT: NORMAL

## 2020-09-16 NOTE — TELEPHONE ENCOUNTER
Called [facility] ,  I am trying to find out if pt is seeing Dr Fine or if Dr Vargas is pcp,  We were sent refills but we havent seen pt since March,   If Dr Fine is seeing pt, I will have the faxes forward to them for refills.

## 2020-10-23 ENCOUNTER — HOSPITAL ENCOUNTER (INPATIENT)
Age: 85
LOS: 4 days | Discharge: HOME HEALTH | DRG: 690 | End: 2020-10-27
Attending: EMERGENCY MEDICINE | Admitting: FAMILY MEDICINE
Payer: MEDICARE

## 2020-10-23 ENCOUNTER — APPOINTMENT (OUTPATIENT)
Dept: GENERAL RADIOLOGY | Age: 85
DRG: 690 | End: 2020-10-23
Payer: MEDICARE

## 2020-10-23 PROBLEM — N39.0 UTI (URINARY TRACT INFECTION): Status: ACTIVE | Noted: 2020-10-23

## 2020-10-23 LAB
ADENOVIRUS BY PCR: NOT DETECTED
ALBUMIN SERPL-MCNC: 3.7 G/DL (ref 3.5–5.2)
ALP BLD-CCNC: 83 U/L (ref 35–104)
ALT SERPL-CCNC: 10 U/L (ref 5–33)
ANION GAP SERPL CALCULATED.3IONS-SCNC: 8 MMOL/L (ref 7–19)
APTT: 22.6 SEC (ref 26–36.2)
AST SERPL-CCNC: 17 U/L (ref 5–32)
BACTERIA: ABNORMAL /HPF
BASOPHILS ABSOLUTE: 0.1 K/UL (ref 0–0.2)
BASOPHILS RELATIVE PERCENT: 0.7 % (ref 0–1)
BILIRUB SERPL-MCNC: 0.6 MG/DL (ref 0.2–1.2)
BILIRUBIN URINE: NEGATIVE
BLOOD, URINE: NEGATIVE
BORDETELLA PARAPERTUSSIS BY PCR: NOT DETECTED
BORDETELLA PERTUSSIS BY PCR: NOT DETECTED
BUN BLDV-MCNC: 15 MG/DL (ref 8–23)
CALCIUM SERPL-MCNC: 10.4 MG/DL (ref 8.8–10.2)
CHLAMYDOPHILIA PNEUMONIAE BY PCR: NOT DETECTED
CHLORIDE BLD-SCNC: 106 MMOL/L (ref 98–111)
CLARITY: ABNORMAL
CO2: 27 MMOL/L (ref 22–29)
COLOR: YELLOW
CORONAVIRUS 229E BY PCR: NOT DETECTED
CORONAVIRUS HKU1 BY PCR: NOT DETECTED
CORONAVIRUS NL63 BY PCR: NOT DETECTED
CORONAVIRUS OC43 BY PCR: NOT DETECTED
CREAT SERPL-MCNC: 0.9 MG/DL (ref 0.5–0.9)
CRYSTALS, UA: ABNORMAL /HPF
EOSINOPHILS ABSOLUTE: 0.1 K/UL (ref 0–0.6)
EOSINOPHILS RELATIVE PERCENT: 1.9 % (ref 0–5)
EPITHELIAL CELLS, UA: 3 /HPF (ref 0–5)
GFR AFRICAN AMERICAN: >59
GFR NON-AFRICAN AMERICAN: 59
GLUCOSE BLD-MCNC: 93 MG/DL (ref 74–109)
GLUCOSE URINE: NEGATIVE MG/DL
HCT VFR BLD CALC: 42.4 % (ref 37–47)
HEMOGLOBIN: 13.9 G/DL (ref 12–16)
HUMAN METAPNEUMOVIRUS BY PCR: NOT DETECTED
HUMAN RHINOVIRUS/ENTEROVIRUS BY PCR: NOT DETECTED
HYALINE CASTS: 12 /HPF (ref 0–8)
IMMATURE GRANULOCYTES #: 0 K/UL
INFLUENZA A BY PCR: NOT DETECTED
INFLUENZA B BY PCR: NOT DETECTED
INR BLD: 1.03 (ref 0.88–1.18)
KETONES, URINE: NEGATIVE MG/DL
LACTIC ACID: 1.2 MMOL/L (ref 0.5–1.9)
LEUKOCYTE ESTERASE, URINE: ABNORMAL
LYMPHOCYTES ABSOLUTE: 2.2 K/UL (ref 1.1–4.5)
LYMPHOCYTES RELATIVE PERCENT: 30.1 % (ref 20–40)
MCH RBC QN AUTO: 29.6 PG (ref 27–31)
MCHC RBC AUTO-ENTMCNC: 32.8 G/DL (ref 33–37)
MCV RBC AUTO: 90.2 FL (ref 81–99)
MONOCYTES ABSOLUTE: 0.4 K/UL (ref 0–0.9)
MONOCYTES RELATIVE PERCENT: 6 % (ref 0–10)
MYCOPLASMA PNEUMONIAE BY PCR: NOT DETECTED
NEUTROPHILS ABSOLUTE: 4.4 K/UL (ref 1.5–7.5)
NEUTROPHILS RELATIVE PERCENT: 60.9 % (ref 50–65)
NITRITE, URINE: POSITIVE
PARAINFLUENZA VIRUS 1 BY PCR: NOT DETECTED
PARAINFLUENZA VIRUS 2 BY PCR: NOT DETECTED
PARAINFLUENZA VIRUS 3 BY PCR: NOT DETECTED
PARAINFLUENZA VIRUS 4 BY PCR: NOT DETECTED
PDW BLD-RTO: 12.9 % (ref 11.5–14.5)
PH UA: 7 (ref 5–8)
PLATELET # BLD: 248 K/UL (ref 130–400)
PMV BLD AUTO: 10.7 FL (ref 9.4–12.3)
POTASSIUM REFLEX MAGNESIUM: 4.7 MMOL/L (ref 3.5–5)
PROTEIN UA: NEGATIVE MG/DL
PROTHROMBIN TIME: 13.4 SEC (ref 12–14.6)
RBC # BLD: 4.7 M/UL (ref 4.2–5.4)
RBC UA: 1 /HPF (ref 0–4)
RESPIRATORY SYNCYTIAL VIRUS BY PCR: NOT DETECTED
SARS-COV-2, PCR: NOT DETECTED
SODIUM BLD-SCNC: 141 MMOL/L (ref 136–145)
SPECIFIC GRAVITY UA: 1.01 (ref 1–1.03)
TOTAL PROTEIN: 5.8 G/DL (ref 6.6–8.7)
UROBILINOGEN, URINE: 2 E.U./DL
WBC # BLD: 7.3 K/UL (ref 4.8–10.8)
WBC UA: 10 /HPF (ref 0–5)

## 2020-10-23 PROCEDURE — 36415 COLL VENOUS BLD VENIPUNCTURE: CPT

## 2020-10-23 PROCEDURE — 2580000003 HC RX 258: Performed by: FAMILY MEDICINE

## 2020-10-23 PROCEDURE — 85025 COMPLETE CBC W/AUTO DIFF WBC: CPT

## 2020-10-23 PROCEDURE — 85610 PROTHROMBIN TIME: CPT

## 2020-10-23 PROCEDURE — 6360000002 HC RX W HCPCS: Performed by: FAMILY MEDICINE

## 2020-10-23 PROCEDURE — 81001 URINALYSIS AUTO W/SCOPE: CPT

## 2020-10-23 PROCEDURE — 93005 ELECTROCARDIOGRAM TRACING: CPT | Performed by: EMERGENCY MEDICINE

## 2020-10-23 PROCEDURE — 87086 URINE CULTURE/COLONY COUNT: CPT

## 2020-10-23 PROCEDURE — 87186 SC STD MICRODIL/AGAR DIL: CPT

## 2020-10-23 PROCEDURE — 80053 COMPREHEN METABOLIC PANEL: CPT

## 2020-10-23 PROCEDURE — 99284 EMERGENCY DEPT VISIT MOD MDM: CPT

## 2020-10-23 PROCEDURE — 87040 BLOOD CULTURE FOR BACTERIA: CPT

## 2020-10-23 PROCEDURE — 71045 X-RAY EXAM CHEST 1 VIEW: CPT

## 2020-10-23 PROCEDURE — 1210000000 HC MED SURG R&B

## 2020-10-23 PROCEDURE — 0202U NFCT DS 22 TRGT SARS-COV-2: CPT

## 2020-10-23 PROCEDURE — 99999 PR OFFICE/OUTPT VISIT,PROCEDURE ONLY: CPT | Performed by: EMERGENCY MEDICINE

## 2020-10-23 PROCEDURE — 83605 ASSAY OF LACTIC ACID: CPT

## 2020-10-23 PROCEDURE — 85730 THROMBOPLASTIN TIME PARTIAL: CPT

## 2020-10-23 RX ORDER — HYDROCHLOROTHIAZIDE 12.5 MG/1
12.5 TABLET ORAL DAILY
COMMUNITY

## 2020-10-23 RX ORDER — NOREPINEPHRINE BIT/0.9 % NACL 16MG/250ML
INFUSION BOTTLE (ML) INTRAVENOUS
Status: DISPENSED
Start: 2020-10-23 | End: 2020-10-24

## 2020-10-23 RX ORDER — SODIUM CHLORIDE 0.9 % (FLUSH) 0.9 %
10 SYRINGE (ML) INJECTION EVERY 12 HOURS SCHEDULED
Status: DISCONTINUED | OUTPATIENT
Start: 2020-10-23 | End: 2020-10-24

## 2020-10-23 RX ORDER — ALLOPURINOL 100 MG/1
100 TABLET ORAL DAILY
COMMUNITY

## 2020-10-23 RX ORDER — SODIUM CHLORIDE 0.9 % (FLUSH) 0.9 %
10 SYRINGE (ML) INJECTION PRN
Status: DISCONTINUED | OUTPATIENT
Start: 2020-10-23 | End: 2020-10-27 | Stop reason: HOSPADM

## 2020-10-23 RX ADMIN — SODIUM CHLORIDE, PRESERVATIVE FREE 10 ML: 5 INJECTION INTRAVENOUS at 21:38

## 2020-10-23 RX ADMIN — ENOXAPARIN SODIUM 40 MG: 40 INJECTION SUBCUTANEOUS at 21:38

## 2020-10-23 RX ADMIN — SODIUM CHLORIDE, PRESERVATIVE FREE 1 G: 5 INJECTION INTRAVENOUS at 19:20

## 2020-10-23 SDOH — HEALTH STABILITY: MENTAL HEALTH: HOW OFTEN DO YOU HAVE A DRINK CONTAINING ALCOHOL?: NEVER

## 2020-10-23 ASSESSMENT — ENCOUNTER SYMPTOMS
SHORTNESS OF BREATH: 0
DIARRHEA: 0
ABDOMINAL PAIN: 0
EYE PAIN: 0
VOMITING: 0

## 2020-10-23 NOTE — ED NOTES
Pt presents to ED with fever 102.1 covid swabbed yesterday but patient has been self isolating. Pt has history of UTI and nursing facility feels as though it could be urinary related but sent her to be further evaluated.       Derek Moreno RN  10/23/20 5692

## 2020-10-23 NOTE — ED NOTES
Bed: 14  Expected date:   Expected time:   Means of arrival:   Comments:  [facility]: Fever (R/O)     Oh De La Cruz RN  10/23/20 6383

## 2020-10-23 NOTE — ED PROVIDER NOTES
[address] [facility] EMERG[facility]Y DEPT  eMERGENCY dEPARTMENT eNCOUnter      Pt Name: Leandra Evans  MRN: [id number]  [place] 3/31/1933  Date of evaluation: 10/23/2020  Provider: Lyssa Chan MD    CHIEF COMPLAINT       Chief Complaint   Patient presents with    Fever     Pt presents with fever 102.1, covid swabbed but not resulted yet         HISTORY OF PRESENT ILLNESS   (Location/Symptom, Timing/Onset,Context/Setting, Quality, Duration, Modifying Factors, Severity)  Note limiting factors. Leandra Evans is a 80 y.o. female who presents to the emergency department due to fever and confusion. Patient lives in assisted living. Has had fever and increasing confusion over the past 24 hours. Had a Covid swab but results not back yet. Other than confusion and fever no other complaints. Patient has dementia at baseline but is more confused today than normal.  A lot of the history is from her daughter. Daughter tells me this is very similar to the last time she had a urinary tract infection. No vomiting. No diarrhea. Patient denies any pain. Daughter said she thought that the patient was having some discomfort in her right ear which patient denied. Patient denies any complaints of any kind at this time. HPI    NursingNotes were reviewed. REVIEW OF SYSTEMS    (2-9 systems for level 4, 10 or more for level 5)     Review of Systems   Constitutional: Positive for fever. Eyes: Negative for pain. Respiratory: Negative for shortness of breath. Cardiovascular: Negative for chest pain and palpitations. Gastrointestinal: Negative for abdominal pain, diarrhea and vomiting. Genitourinary: Negative for dysuria. Musculoskeletal: Negative for neck pain and neck stiffness. Skin: Negative for rash. Neurological: Negative for weakness and headaches. Psychiatric/Behavioral: Positive for confusion. All other systems reviewed and are negative.       A complete review of systems was performed and is negative except as noted above in the HPI. PAST MEDICAL HISTORY     Past Medical History:   Diagnosis Date    Dementia [facility])          SURGICAL HISTORY     History reviewed. No pertinent surgical history. CURRENT MEDICATIONS       Previous Medications    ACETAMINOPHEN (TYLENOL) 500 MG TABLET    Take 1 tablet by mouth nightly as needed (sleep)    AMLODIPINE (NORVASC) 5 MG TABLET    Take 1 tablet by mouth daily    ASPIRIN 81 MG CHEWABLE TABLET    Take 81 mg by mouth daily    BENAZEPRIL (LOTENSIN) 40 MG TABLET    Take 40 mg by mouth daily    BUSPIRONE (BUSPAR) 5 MG TABLET    Take 5 mg by mouth 3 times daily    ERGOCALCIFEROL (VITAMIN D2 PO)    Take 50,000 Units by mouth twice a week On Sunday and Thursday    ESCITALOPRAM (LEXAPRO) 20 MG TABLET    Take 20 mg by mouth daily    MEGESTROL ACETATE (MEGACE) 400 MG/10ML SUSP    Take 10 mLs by mouth daily    MEMANTINE ER (NAMENDA XR) 28 MG CP24 EXTENDED RELEASE CAPSULE    Take 28 mg by mouth daily    METOPROLOL SUCCINATE (TOPROL XL) 50 MG EXTENDED RELEASE TABLET    Take 50 mg by mouth daily       ALLERGIES     Sulfa antibiotics    FAMILY HISTORY     History reviewed. No pertinent family history. SOCIAL HISTORY       Social History     Socioeconomic History    Marital status:       Spouse name: None    Number of children: None    Years of education: None    Highest education level: None   Occupational History    None   Social Needs    Financial resource strain: None    Food insecurity     Worry: None     Inability: None    Transportation needs     Medical: None     Non-medical: None   Tobacco Use    Smoking status: None   Substance and Sexual Activity    Alcohol use: None    Drug use: None    Sexual activity: None   Lifestyle    Physical activity     Days per week: None     Minutes per session: None    Stress: None   Relationships    Social connections     Talks on phone: None     Gets together: None     Attends Taoist service: None     Active member of club or organization: None     Attends meetings of clubs or organizations: None     Relationship status: None    Intimate partner violence     Fear of current or ex partner: None     Emotionally abused: None     Physically abused: None     Forced sexual activity: None   Other Topics Concern    None   Social History Narrative    None       SCREENINGS    [place] Coma Scale  Eye Opening: Spontaneous  Best Verbal Response: Oriented  Best Motor Response: Obeys commands  [place] Coma Scale Score: 15        PHYSICAL EXAM    (up to 7 for level 4, 8 or more for level 5)     ED Triage Vitals   BP Temp Temp src Pulse Resp SpO2 Height Weight   -- -- -- -- -- -- -- --       Physical Exam  Vitals signs reviewed. Constitutional:       General: She is not in acute distress. Appearance: She is well-developed. HENT:      Head: Normocephalic and atraumatic. Right Ear: External ear normal.      Left Ear: External ear normal.      Ears:      Comments: Tried to examine tympanic membranes bilaterally but patient would not allow me to look into her ears. Mouth/Throat:      Mouth: Mucous membranes are moist.      Pharynx: Oropharynx is clear. Eyes:      General: No scleral icterus. Pupils: Pupils are equal, round, and reactive to light. Comments: Pupils equal round and reactive to light. Could not assess extraocular movements as the patient did not seem to understand when trying to test EOM. Neck:      Musculoskeletal: Normal range of motion and neck supple. No neck rigidity. Vascular: No JVD. Cardiovascular:      Rate and Rhythm: Normal rate and regular rhythm. Pulses: Normal pulses. Heart sounds: Normal heart sounds. No murmur. Pulmonary:      Effort: Pulmonary effort is normal. No respiratory distress. Breath sounds: Normal breath sounds. Abdominal:      General: There is no distension. Palpations: Abdomen is soft. Tenderness:  There is no abdominal tenderness. There is no guarding or rebound. Musculoskeletal: Normal range of motion. General: No tenderness or deformity. Right lower leg: No edema. Left lower leg: No edema. Lymphadenopathy:      Cervical: No cervical adenopathy. Skin:     General: Skin is warm and dry. Capillary Refill: Capillary refill takes less than 2 seconds. Neurological:      General: No focal deficit present. Mental Status: She is alert. She is confused. GCS: GCS eye subscore is 4. GCS verbal subscore is 4. GCS motor subscore is 6. Cranial Nerves: Cranial nerves are intact. Sensory: Sensation is intact. Motor: Motor function is intact. Comments: Slightly limited exam.  Confused. No focal deficits. Psychiatric:         Mood and Affect: Mood normal.         Behavior: Behavior normal.         DIAGNOSTIC RESULTS     EKG: All EKG's are interpreted by the Emergency Department Physician who either signs or Co-signs this chart in the absence of a cardiologist.    Sinus rhythm. Normal QT. Right bundle-branch block. Left anterior fascicular block. LVH. RADIOLOGY:   Non-plain film images such as CT, Ultrasound and MRI are read by the radiologist.  Yin images are visualized and preliminarily interpreted by the emergency physician with the below findings:    Interpretation per the Radiologist below, if available at the time of this note:    XR CHEST PORTABLE   Final Result   No acute findings.    Signed by Dr Luna Camilo on 10/23/2020 3:07 PM            LABS:  Labs Reviewed   CBC WITH AUTO DIFFERENTIAL - Abnormal; Notable for the following components:       Result Value    MCHC 32.8 (*)     All other components within normal limits   COMPREHENSIVE METABOLIC PANEL W/ REFLEX TO MG FOR LOW K - Abnormal; Notable for the following components:    GFR Non-African American 59 (*)     Calcium 10.4 (*)     Total Protein 5.8 (*)     All other components within normal limits APTT - Abnormal; Notable for the following components:    aPTT 22.6 (*)     All other components within normal limits   URINE RT REFLEX TO CULTURE - Abnormal; Notable for the following components:    Clarity, UA CLOUDY (*)     Urobilinogen, Urine 2.0 (*)     Nitrite, Urine POSITIVE (*)     Leukocyte Esterase, Urine LARGE (*)     All other components within normal limits   MICROSCOPIC URINALYSIS - Abnormal; Notable for the following components:    Bacteria, UA 4+ (*)     Crystals, UA NEG (*)     Hyaline Casts, UA 12 (*)     WBC, UA 10 (*)     All other components within normal limits   RESPIRATORY PANEL, MOLECULAR, WITH COVID-19   CULTURE, BLOOD 1   CULTURE, BLOOD 2   CULTURE, URINE   PROTIME-INR   LACTIC ACID, PLASMA       All other labs were within normal range or not returned as of this dictation. EMERGENCY DEPARTMENT COURSE and DIFFERENTIALDIAGNOSIS/MDM:   Vitals:    Vitals:    10/23/20 1250 10/23/20 1258   BP:  (!) 138/58   Pulse: 53 53   Temp:  98.2 °F (36.8 °C)   TempSrc:  Oral   SpO2:  97%       MDM  Patient is nontoxic on exam.  She is in no distress. Slightly confused which daughter said is typical when she gets urinary tract infections. Discussed possibly doing head CT but do not see strong indication for this given lack of any focal deficits and the fact that her urinalysis is consistent with a UTI and that the daughter tells me that her current confusion is pretty typical for when she gets UTIs. Daughter okay holding off on CT for now. Patient's case discussed with Kulwinder Cavazos who is on for Dr. Dayami Obando and agreeable plan of care and admission. I will put in bridge orders per Dr. Dayami Obando request.  Daughter updated about plan. Confirmed with daughter patient's CODE STATUS. CONSULTS:  None    PROCEDURES:  Unless otherwise notedbelow, none     Procedures    FINAL IMPRESSION     1. Altered mental status, unspecified altered mental status type    2.  Urinary tract infection without hematuria, site unspecified          DISPOSITION/PLAN   DISPOSITION Admitted 10/23/2020 03:26:06 PM      PATIENT REFERRED TO:  @FUP@    DISCHARGE MEDICATIONS:  New Prescriptions    No medications on file          (Please note that portions of this note were completed with a voice recognition program.  Efforts were made to edit the dictations butoccasionally words are mis-transcribed.)    Jeovany Valdez MD (electronically signed)  AttendingEmergency Physician         Jeovany Valdez MD  10/23/20 8502

## 2020-10-24 PROBLEM — B99.9 INFECTIOUS ENCEPHALOPATHY: Status: ACTIVE | Noted: 2020-10-24

## 2020-10-24 PROBLEM — G93.49 INFECTIOUS ENCEPHALOPATHY: Status: ACTIVE | Noted: 2020-10-24

## 2020-10-24 PROCEDURE — 6360000002 HC RX W HCPCS: Performed by: FAMILY MEDICINE

## 2020-10-24 PROCEDURE — 1210000000 HC MED SURG R&B

## 2020-10-24 PROCEDURE — 6370000000 HC RX 637 (ALT 250 FOR IP): Performed by: FAMILY MEDICINE

## 2020-10-24 PROCEDURE — 2580000003 HC RX 258: Performed by: FAMILY MEDICINE

## 2020-10-24 RX ORDER — POLYETHYLENE GLYCOL 3350 17 G/17G
17 POWDER, FOR SOLUTION ORAL DAILY PRN
Status: DISCONTINUED | OUTPATIENT
Start: 2020-10-24 | End: 2020-10-27 | Stop reason: HOSPADM

## 2020-10-24 RX ORDER — AMLODIPINE BESYLATE 5 MG/1
5 TABLET ORAL DAILY
Status: DISCONTINUED | OUTPATIENT
Start: 2020-10-24 | End: 2020-10-27 | Stop reason: HOSPADM

## 2020-10-24 RX ORDER — ALLOPURINOL 100 MG/1
100 TABLET ORAL DAILY
Status: DISCONTINUED | OUTPATIENT
Start: 2020-10-24 | End: 2020-10-27 | Stop reason: HOSPADM

## 2020-10-24 RX ORDER — ACETAMINOPHEN 325 MG/1
650 TABLET ORAL EVERY 6 HOURS PRN
Status: DISCONTINUED | OUTPATIENT
Start: 2020-10-24 | End: 2020-10-27 | Stop reason: HOSPADM

## 2020-10-24 RX ORDER — ONDANSETRON 2 MG/ML
4 INJECTION INTRAMUSCULAR; INTRAVENOUS EVERY 6 HOURS PRN
Status: DISCONTINUED | OUTPATIENT
Start: 2020-10-24 | End: 2020-10-27 | Stop reason: HOSPADM

## 2020-10-24 RX ORDER — LISINOPRIL 20 MG/1
40 TABLET ORAL DAILY
Status: DISCONTINUED | OUTPATIENT
Start: 2020-10-24 | End: 2020-10-27 | Stop reason: HOSPADM

## 2020-10-24 RX ORDER — HYDROCHLOROTHIAZIDE 12.5 MG/1
12.5 CAPSULE, GELATIN COATED ORAL DAILY
Status: DISCONTINUED | OUTPATIENT
Start: 2020-10-24 | End: 2020-10-27 | Stop reason: HOSPADM

## 2020-10-24 RX ORDER — ASPIRIN 81 MG/1
81 TABLET, CHEWABLE ORAL DAILY
Status: DISCONTINUED | OUTPATIENT
Start: 2020-10-24 | End: 2020-10-27 | Stop reason: HOSPADM

## 2020-10-24 RX ORDER — METOPROLOL SUCCINATE 50 MG/1
50 TABLET, EXTENDED RELEASE ORAL DAILY
Status: DISCONTINUED | OUTPATIENT
Start: 2020-10-24 | End: 2020-10-27 | Stop reason: HOSPADM

## 2020-10-24 RX ORDER — SODIUM CHLORIDE 0.9 % (FLUSH) 0.9 %
10 SYRINGE (ML) INJECTION PRN
Status: DISCONTINUED | OUTPATIENT
Start: 2020-10-24 | End: 2020-10-27 | Stop reason: HOSPADM

## 2020-10-24 RX ORDER — PROMETHAZINE HYDROCHLORIDE 12.5 MG/1
12.5 TABLET ORAL EVERY 6 HOURS PRN
Status: DISCONTINUED | OUTPATIENT
Start: 2020-10-24 | End: 2020-10-27 | Stop reason: HOSPADM

## 2020-10-24 RX ORDER — ACETAMINOPHEN 500 MG
500 TABLET ORAL NIGHTLY PRN
Status: DISCONTINUED | OUTPATIENT
Start: 2020-10-24 | End: 2020-10-27 | Stop reason: HOSPADM

## 2020-10-24 RX ORDER — ESCITALOPRAM OXALATE 10 MG/1
20 TABLET ORAL DAILY
Status: DISCONTINUED | OUTPATIENT
Start: 2020-10-24 | End: 2020-10-27 | Stop reason: HOSPADM

## 2020-10-24 RX ORDER — SODIUM CHLORIDE 0.9 % (FLUSH) 0.9 %
10 SYRINGE (ML) INJECTION EVERY 12 HOURS SCHEDULED
Status: DISCONTINUED | OUTPATIENT
Start: 2020-10-24 | End: 2020-10-27 | Stop reason: HOSPADM

## 2020-10-24 RX ORDER — ACETAMINOPHEN 650 MG/1
650 SUPPOSITORY RECTAL EVERY 6 HOURS PRN
Status: DISCONTINUED | OUTPATIENT
Start: 2020-10-24 | End: 2020-10-27 | Stop reason: HOSPADM

## 2020-10-24 RX ORDER — MEMANTINE HYDROCHLORIDE 5 MG/1
10 TABLET ORAL 2 TIMES DAILY
Status: DISCONTINUED | OUTPATIENT
Start: 2020-10-24 | End: 2020-10-27 | Stop reason: HOSPADM

## 2020-10-24 RX ADMIN — ENOXAPARIN SODIUM 40 MG: 40 INJECTION SUBCUTANEOUS at 17:22

## 2020-10-24 RX ADMIN — ALLOPURINOL 100 MG: 100 TABLET ORAL at 18:07

## 2020-10-24 RX ADMIN — ASPIRIN 81 MG: 81 TABLET, CHEWABLE ORAL at 18:07

## 2020-10-24 RX ADMIN — LISINOPRIL 40 MG: 20 TABLET ORAL at 18:07

## 2020-10-24 RX ADMIN — AMLODIPINE BESYLATE 5 MG: 5 TABLET ORAL at 18:07

## 2020-10-24 RX ADMIN — MEMANTINE HYDROCHLORIDE 10 MG: 5 TABLET ORAL at 20:30

## 2020-10-24 RX ADMIN — HYDROCHLOROTHIAZIDE 12.5 MG: 12.5 CAPSULE ORAL at 18:07

## 2020-10-24 RX ADMIN — SODIUM CHLORIDE, PRESERVATIVE FREE 1 G: 5 INJECTION INTRAVENOUS at 18:11

## 2020-10-24 RX ADMIN — SODIUM CHLORIDE, PRESERVATIVE FREE 10 ML: 5 INJECTION INTRAVENOUS at 20:30

## 2020-10-24 RX ADMIN — METOPROLOL SUCCINATE 50 MG: 50 TABLET, EXTENDED RELEASE ORAL at 18:07

## 2020-10-24 RX ADMIN — ESCITALOPRAM OXALATE 20 MG: 10 TABLET ORAL at 18:07

## 2020-10-24 ASSESSMENT — PAIN SCALES - GENERAL
PAINLEVEL_OUTOF10: 0
PAINLEVEL_OUTOF10: 0

## 2020-10-24 NOTE — H&P
[facility]  History and Physical    Patient:  Yelena Westfall  MRN: [id number]    CHIEF COMPLAINT: Confusion      PCP: No primary care provider on file. HISTORY OF PRESENT ILLNESS:   The patient is a 80 y.o. female who has known vascular dementia and history of recurrent UTIs who presents with worsening confusion at her assisted living facility. Patient was also found to be febrile over the last 24 hours leading to her hospitalization. Covid swab was negative. Daughter reports that this is very similar to the symptoms she usually gets with the UTI. Denies any nausea, vomiting, diarrhea, pain. Patient is confused and cannot provide any history. REVIEW OF SYSTEMS:    Past Medical History:      Diagnosis Date    [facility] ([facility]     Dementia [facility])        Past Surgical History:      Procedure Laterality Date    HYSTERECTOMY      JOINT REPLACEMENT      knee and shoulder replacements       Medications Prior to Admission:    Prior to Admission medications    Medication Sig Start Date End Date Taking?  Authorizing Provider   hydroCHLOROthiazide (HYDRODIURIL) 12.5 MG tablet Take 12.5 mg by mouth daily   Yes Historical Provider, MD   allopurinol (ZYLOPRIM) 100 MG tablet Take 100 mg by mouth daily   Yes Historical Provider, MD   acetaminophen (TYLENOL) 500 MG tablet Take 1 tablet by mouth nightly as needed (sleep) 8/17/20   Jonathan Patterson PA-C   amLODIPine (NORVASC) 5 MG tablet Take 1 tablet by mouth daily 8/17/20   Jonathan Patterson PA-C   aspirin 81 MG chewable tablet Take 81 mg by mouth daily    Historical Provider, MD   memantine ER (NAMENDA XR) 28 MG CP24 extended release capsule Take 28 mg by mouth daily    Historical Provider, MD   metoprolol succinate (TOPROL XL) 50 MG extended release tablet Take 50 mg by mouth daily    Historical Provider, MD   escitalopram (LEXAPRO) 20 MG tablet Take 20 mg by mouth daily    Historical Provider, MD   Ergocalciferol (VITAMIN D2 PO) Take 50,000 Units by mouth twice a week On Sunday and Thursday    Historical Provider, MD   benazepril (LOTENSIN) 40 MG tablet Take 40 mg by mouth daily    Historical Provider, MD       Allergies:  Sulfa antibiotics    Social History:   Social History     Socioeconomic History    Marital status:      Spouse name: Not on file    Number of children: Not on file    Years of education: Not on file    Highest education level: Not on file   Occupational History    Not on file   Social Needs    Financial resource strain: Not on file    Food insecurity     Worry: Not on file     Inability: Not on file    Transportation needs     Medical: Not on file     Non-medical: Not on file   Tobacco Use    Smoking status: Never Smoker    Smokeless tobacco: Never Used   Substance and Sexual Activity    Alcohol use: Never     Frequency: Never    Drug use: Never    Sexual activity: Not on file   Lifestyle    Physical activity     Days per week: Not on file     Minutes per session: Not on file    Stress: Not on file   Relationships    Social connections     Talks on phone: Not on file     Gets together: Not on file     Attends Jainism service: Not on file     Active member of club or organization: Not on file     Attends meetings of clubs or organizations: Not on file     Relationship status: Not on file    Intimate partner violence     Fear of current or ex partner: Not on file     Emotionally abused: Not on file     Physically abused: Not on file     Forced sexual activity: Not on file   Other Topics Concern    Not on file   Social History Narrative    Not on file       Family History:   History reviewed. No pertinent family history.      Review of systems:  Review of Systems   Unable to perform ROS: Mental status change        A full 14 point review of systems is otherwise negative outside listed above and HPI      Physical Exam:        Physical Exam  Constitutional:       Comments: Elderly and frail-appearing   HENT:      Head: Previous UTI was sensitive to Rocephin. Currently on Rocephin 1 g daily. 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 Treating as above. CKD stage III  Creatinine stable. Will monitor daily    Essential hypertension  Continue home medications    Disposition: Inpatient    DVT prophylaxis: Lovenox    CODE STATUS: DNR    Principal Problem:    UTI (urinary tract infection)  Active Problems:    Vascular dementia ([facility] [address]    CKD (chronic kidney disease), stage III    Essential hypertension    Infectious encephalopathy  Resolved Problems:    * No resolved hospital problems.  Yosi Ruff MD  10/24/2020 2:41 PM

## 2020-10-25 LAB
ALBUMIN SERPL-MCNC: 3.6 G/DL (ref 3.5–5.2)
ALP BLD-CCNC: 84 U/L (ref 35–104)
ALT SERPL-CCNC: 9 U/L (ref 5–33)
ANION GAP SERPL CALCULATED.3IONS-SCNC: 10 MMOL/L (ref 7–19)
AST SERPL-CCNC: 12 U/L (ref 5–32)
BASOPHILS ABSOLUTE: 0.1 K/UL (ref 0–0.2)
BASOPHILS RELATIVE PERCENT: 0.7 % (ref 0–1)
BILIRUB SERPL-MCNC: 0.5 MG/DL (ref 0.2–1.2)
BUN BLDV-MCNC: 13 MG/DL (ref 8–23)
CALCIUM SERPL-MCNC: 10.2 MG/DL (ref 8.8–10.2)
CHLORIDE BLD-SCNC: 106 MMOL/L (ref 98–111)
CO2: 27 MMOL/L (ref 22–29)
CREAT SERPL-MCNC: 1 MG/DL (ref 0.5–0.9)
EOSINOPHILS ABSOLUTE: 0.3 K/UL (ref 0–0.6)
EOSINOPHILS RELATIVE PERCENT: 3.5 % (ref 0–5)
GFR AFRICAN AMERICAN: >59
GFR NON-AFRICAN AMERICAN: 52
GLUCOSE BLD-MCNC: 97 MG/DL (ref 74–109)
HCT VFR BLD CALC: 41.6 % (ref 37–47)
HEMOGLOBIN: 14 G/DL (ref 12–16)
IMMATURE GRANULOCYTES #: 0 K/UL
LYMPHOCYTES ABSOLUTE: 2.3 K/UL (ref 1.1–4.5)
LYMPHOCYTES RELATIVE PERCENT: 33 % (ref 20–40)
MCH RBC QN AUTO: 30 PG (ref 27–31)
MCHC RBC AUTO-ENTMCNC: 33.7 G/DL (ref 33–37)
MCV RBC AUTO: 89.1 FL (ref 81–99)
MONOCYTES ABSOLUTE: 0.5 K/UL (ref 0–0.9)
MONOCYTES RELATIVE PERCENT: 6.7 % (ref 0–10)
NEUTROPHILS ABSOLUTE: 3.9 K/UL (ref 1.5–7.5)
NEUTROPHILS RELATIVE PERCENT: 55.8 % (ref 50–65)
PDW BLD-RTO: 13 % (ref 11.5–14.5)
PLATELET # BLD: 226 K/UL (ref 130–400)
PMV BLD AUTO: 10.8 FL (ref 9.4–12.3)
POTASSIUM REFLEX MAGNESIUM: 3.8 MMOL/L (ref 3.5–5)
RBC # BLD: 4.67 M/UL (ref 4.2–5.4)
SODIUM BLD-SCNC: 143 MMOL/L (ref 136–145)
TOTAL PROTEIN: 5.6 G/DL (ref 6.6–8.7)
WBC # BLD: 7.1 K/UL (ref 4.8–10.8)

## 2020-10-25 PROCEDURE — 2580000003 HC RX 258: Performed by: FAMILY MEDICINE

## 2020-10-25 PROCEDURE — 1210000000 HC MED SURG R&B

## 2020-10-25 PROCEDURE — 6370000000 HC RX 637 (ALT 250 FOR IP): Performed by: FAMILY MEDICINE

## 2020-10-25 PROCEDURE — 80053 COMPREHEN METABOLIC PANEL: CPT

## 2020-10-25 PROCEDURE — 6360000002 HC RX W HCPCS: Performed by: FAMILY MEDICINE

## 2020-10-25 PROCEDURE — 36415 COLL VENOUS BLD VENIPUNCTURE: CPT

## 2020-10-25 PROCEDURE — 85025 COMPLETE CBC W/AUTO DIFF WBC: CPT

## 2020-10-25 RX ADMIN — PIPERACILLIN SODIUM AND TAZOBACTAM SODIUM 3.38 G: 3; .375 INJECTION, POWDER, LYOPHILIZED, FOR SOLUTION INTRAVENOUS at 17:51

## 2020-10-25 RX ADMIN — ALLOPURINOL 100 MG: 100 TABLET ORAL at 08:17

## 2020-10-25 RX ADMIN — ASPIRIN 81 MG: 81 TABLET, CHEWABLE ORAL at 08:17

## 2020-10-25 RX ADMIN — LISINOPRIL 40 MG: 20 TABLET ORAL at 08:17

## 2020-10-25 RX ADMIN — SODIUM CHLORIDE, PRESERVATIVE FREE 10 ML: 5 INJECTION INTRAVENOUS at 22:40

## 2020-10-25 RX ADMIN — PIPERACILLIN SODIUM AND TAZOBACTAM SODIUM 3.38 G: 3; .375 INJECTION, POWDER, LYOPHILIZED, FOR SOLUTION INTRAVENOUS at 11:09

## 2020-10-25 RX ADMIN — HYDROCHLOROTHIAZIDE 12.5 MG: 12.5 CAPSULE ORAL at 08:17

## 2020-10-25 RX ADMIN — SODIUM CHLORIDE, PRESERVATIVE FREE 10 ML: 5 INJECTION INTRAVENOUS at 08:17

## 2020-10-25 RX ADMIN — ENOXAPARIN SODIUM 40 MG: 40 INJECTION SUBCUTANEOUS at 17:50

## 2020-10-25 RX ADMIN — AMLODIPINE BESYLATE 5 MG: 5 TABLET ORAL at 08:17

## 2020-10-25 RX ADMIN — MEMANTINE HYDROCHLORIDE 10 MG: 5 TABLET ORAL at 22:39

## 2020-10-25 RX ADMIN — ESCITALOPRAM OXALATE 20 MG: 10 TABLET ORAL at 08:17

## 2020-10-25 RX ADMIN — MEMANTINE HYDROCHLORIDE 10 MG: 5 TABLET ORAL at 08:17

## 2020-10-25 RX ADMIN — METOPROLOL SUCCINATE 50 MG: 50 TABLET, EXTENDED RELEASE ORAL at 08:17

## 2020-10-25 NOTE — PLAN OF CARE
Problem: Falls - Risk of:  Goal: Will remain free from falls  Description: Will remain free from falls  10/25/2020 1429 by Jordan Harp RN  Outcome: Ongoing  10/25/2020 0322 by Yvonne Gutierres LPN  Outcome: Ongoing  Goal: Absence of physical injury  Description: Absence of physical injury  10/25/2020 1429 by Jordan Harp RN  Outcome: Ongoing  10/25/2020 0322 by Yvonne Gutierres LPN  Outcome: Ongoing

## 2020-10-25 NOTE — PROGRESS NOTES
[facility]  Daily Progress Note  Ana Duran  MRN: [id number] LOS: 2    Admit Date: 10/23/2020   10/25/2020 9:25 AM    Subjective:          Chief Complaint:  Chief Complaint   Patient presents with    Fever     Pt presents with fever 102.1, covid swabbed but not resulted yet       Interval History:    Reviewed overnight events and nursing notes. Patient generally improved this morning. White count downtrending. No further fevers. No family at bedside and patient confused. Review of Systems   Unable to perform ROS: Dementia       DIET:  DIET GENERAL;    Medications:      piperacillin-tazobactam  3.375 g Intravenous Q8H    allopurinol  100 mg Oral Daily    amLODIPine  5 mg Oral Daily    aspirin  81 mg Oral Daily    lisinopril  40 mg Oral Daily    escitalopram  20 mg Oral Daily    hydroCHLOROthiazide  12.5 mg Oral Daily    metoprolol succinate  50 mg Oral Daily    memantine  10 mg Oral BID    sodium chloride flush  10 mL Intravenous 2 times per day    enoxaparin  40 mg Subcutaneous Daily       Data:     Code Status: DNR-CC    History reviewed. No pertinent family history. Social History     Socioeconomic History    Marital status:       Spouse name: Not on file    Number of children: Not on file    Years of education: Not on file    Highest education level: Not on file   Occupational History    Not on file   Social Needs    Financial resource strain: Not on file    Food insecurity     Worry: Not on file     Inability: Not on file    Transportation needs     Medical: Not on file     Non-medical: Not on file   Tobacco Use    Smoking status: Never Smoker    Smokeless tobacco: Never Used   Substance and Sexual Activity    Alcohol use: Never     Frequency: Never    Drug use: Never    Sexual activity: Not on file   Lifestyle    Physical activity     Days per week: Not on file     Minutes per session: Not on file    Stress: Not on file   Relationships    Social connections     Talks on phone: Not on file     Gets together: Not on file     Attends Yarsanism service: Not on file     Active member of club or organization: Not on file     Attends meetings of clubs or organizations: Not on file     Relationship status: Not on file    Intimate partner violence     Fear of current or ex partner: Not on file     Emotionally abused: Not on file     Physically abused: Not on file     Forced sexual activity: Not on file   Other Topics Concern    Not on file   Social History Narrative    Not on file       Labs:  CBC:   Recent Labs     10/23/20  1415 10/25/20  0348   WBC 7.3 7.1   HGB 13.9 14.0    226     BMP:    Recent Labs     10/23/20  1345 10/25/20  0348    143   K 4.7 3.8    106   CO2 27 27   BUN 15 13   CREATININE 0.9 1.0*   GLUCOSE 93 97     Hepatic:   Recent Labs     10/23/20  1345 10/25/20  0348   AST 17 12   ALT 10 9   BILITOT 0.6 0.5   ALKPHOS 83 84     Lipids: No results for input(s): CHOL, HDL in the last 72 hours. Invalid input(s): LDLCALCU  INR:   Recent Labs     10/23/20  1345   INR 1.03       Objective:     Vitals: BP (!) 150/72   Pulse 60   Temp 97.5 °F (36.4 °C) (Temporal)   Resp 20   Ht 5' 5\" (1.651 m)   Wt 183 lb 9 oz (83.3 kg)   SpO2 93%   BMI 30.55 kg/m²      Intake/Output Summary (Last 24 hours) at 10/25/2020 0925  Last data filed at 10/25/2020 0523  Gross per 24 hour   Intake 220 ml   Output 1050 ml   Net -830 ml    Temp (24hrs), Av.3 °F (36.3 °C), Min:96.9 °F (36.1 °C), Max:97.5 °F (36.4 °C)    Glucose:  No results for input(s): POCGLU in the last 72 hours. Physical Exam  Constitutional:       Comments: Elderly and frail-appearing   HENT:      Head: Normocephalic. Nose: Nose normal.      Mouth/Throat:      Mouth: Mucous membranes are moist.   Eyes:      Pupils: Pupils are equal, round, and reactive to light. Cardiovascular:      Rate and Rhythm: Normal rate and regular rhythm. Pulses: Normal pulses. Pulmonary:      Effort: Pulmonary effort is normal. No respiratory distress. Breath sounds: Normal breath sounds. Abdominal:      General: Abdomen is flat. Palpations: Abdomen is soft. Musculoskeletal: Normal range of motion. General: No swelling. Skin:     General: Skin is warm and dry. Capillary Refill: Capillary refill takes less than 2 seconds. Neurological:      Mental Status: She is alert. Comments: Conversational but not oriented   Psychiatric:         Mood and Affect: Mood normal.           Assessment and Plan:     Primary Problem:  UTI (urinary tract infection)    Hospital Problem list:  Principal Problem:    UTI (urinary tract infection)  Active Problems:    Vascular dementia ([facility]    CKD (chronic kidney disease), stage III    Essential hypertension    Infectious encephalopathy  Resolved Problems:    * No resolved hospital problems. *      PMH:  Past Medical History:   Diagnosis Date    Cancer ([facility]     Dementia ([facility]        Treatment Plan:  Urinary tract infection  Urine culture growing E. coli. Previous UTI was sensitive to Rocephin. Currently on Rocephin 1 g daily. Sensitivities pending. Culture indicates multidrug resistance. Plan:  -Changes Zosyn until sensitivities are complete given multidrug-resistant organism. Patient is improving on Rocephin with downtrending white count no further fevers. Will likely be able to narrow coverage once sensitivities are obtained.     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 Treating as above.     CKD stage III  Creatinine stable. Will monitor daily     Essential hypertension  Continue home medications    Disposition: Continue inpatient care awaiting sensitivities    Reviewed treatment plans with the patient and/or family. 20 minutes spent in face to face interaction and coordination of care.      Electronically signed by Jer Carlin MD on 10/25/2020 at 9:25 AM

## 2020-10-26 LAB
EKG P AXIS: 47 DEGREES
EKG P-R INTERVAL: 192 MS
EKG Q-T INTERVAL: 466 MS
EKG QRS DURATION: 122 MS
EKG QTC CALCULATION (BAZETT): 452 MS
EKG T AXIS: 48 DEGREES
ORGANISM: ABNORMAL
ORGANISM: ABNORMAL
URINE CULTURE, ROUTINE: ABNORMAL

## 2020-10-26 PROCEDURE — 2580000003 HC RX 258: Performed by: FAMILY MEDICINE

## 2020-10-26 PROCEDURE — 93010 ELECTROCARDIOGRAM REPORT: CPT | Performed by: INTERNAL MEDICINE

## 2020-10-26 PROCEDURE — 6370000000 HC RX 637 (ALT 250 FOR IP): Performed by: FAMILY MEDICINE

## 2020-10-26 PROCEDURE — 6360000002 HC RX W HCPCS: Performed by: FAMILY MEDICINE

## 2020-10-26 PROCEDURE — 1210000000 HC MED SURG R&B

## 2020-10-26 RX ORDER — ENALAPRILAT 2.5 MG/2ML
1.25 INJECTION INTRAVENOUS EVERY 6 HOURS PRN
Status: DISCONTINUED | OUTPATIENT
Start: 2020-10-26 | End: 2020-10-27 | Stop reason: HOSPADM

## 2020-10-26 RX ORDER — HYDRALAZINE HYDROCHLORIDE 20 MG/ML
10 INJECTION INTRAMUSCULAR; INTRAVENOUS EVERY 6 HOURS PRN
Status: DISCONTINUED | OUTPATIENT
Start: 2020-10-26 | End: 2020-10-26 | Stop reason: RX

## 2020-10-26 RX ORDER — CLONIDINE HYDROCHLORIDE 0.1 MG/1
0.1 TABLET ORAL EVERY 4 HOURS PRN
Status: DISCONTINUED | OUTPATIENT
Start: 2020-10-26 | End: 2020-10-27 | Stop reason: HOSPADM

## 2020-10-26 RX ADMIN — PIPERACILLIN SODIUM AND TAZOBACTAM SODIUM 3.38 G: 3; .375 INJECTION, POWDER, LYOPHILIZED, FOR SOLUTION INTRAVENOUS at 18:22

## 2020-10-26 RX ADMIN — PIPERACILLIN SODIUM AND TAZOBACTAM SODIUM 3.38 G: 3; .375 INJECTION, POWDER, LYOPHILIZED, FOR SOLUTION INTRAVENOUS at 10:25

## 2020-10-26 RX ADMIN — ESCITALOPRAM OXALATE 20 MG: 10 TABLET ORAL at 07:47

## 2020-10-26 RX ADMIN — ASPIRIN 81 MG: 81 TABLET, CHEWABLE ORAL at 07:47

## 2020-10-26 RX ADMIN — PIPERACILLIN SODIUM AND TAZOBACTAM SODIUM 3.38 G: 3; .375 INJECTION, POWDER, LYOPHILIZED, FOR SOLUTION INTRAVENOUS at 02:39

## 2020-10-26 RX ADMIN — ALLOPURINOL 100 MG: 100 TABLET ORAL at 07:47

## 2020-10-26 RX ADMIN — ENOXAPARIN SODIUM 40 MG: 40 INJECTION SUBCUTANEOUS at 18:23

## 2020-10-26 RX ADMIN — MEMANTINE HYDROCHLORIDE 10 MG: 5 TABLET ORAL at 07:47

## 2020-10-26 RX ADMIN — AMLODIPINE BESYLATE 5 MG: 5 TABLET ORAL at 07:47

## 2020-10-26 RX ADMIN — METOPROLOL SUCCINATE 50 MG: 50 TABLET, EXTENDED RELEASE ORAL at 07:48

## 2020-10-26 RX ADMIN — SODIUM CHLORIDE, PRESERVATIVE FREE 10 ML: 5 INJECTION INTRAVENOUS at 07:48

## 2020-10-26 RX ADMIN — LISINOPRIL 40 MG: 20 TABLET ORAL at 07:47

## 2020-10-26 RX ADMIN — HYDROCHLOROTHIAZIDE 12.5 MG: 12.5 CAPSULE ORAL at 07:48

## 2020-10-26 NOTE — PROGRESS NOTES
[facility]  Daily Progress Note  Marcus Bolton  MRN: [id number] LOS: 3    Admit Date: 10/23/2020   10/26/2020 7:36 AM          Chief Complaint:  Chief Complaint   Patient presents with    Fever     Pt presents with fever 102.1, covid swabbed but not resulted yet       Interval History:    Reviewed overnight events and nursing notes. Status:  improved  No reports of pain  Denies shortness of breath  Rested well last night  No chest pain reported  Family at bedside, seems to be close to baseline mentally    DIET:  DIET GENERAL;    Medications:      piperacillin-tazobactam  3.375 g Intravenous Q8H    allopurinol  100 mg Oral Daily    amLODIPine  5 mg Oral Daily    aspirin  81 mg Oral Daily    lisinopril  40 mg Oral Daily    escitalopram  20 mg Oral Daily    hydroCHLOROthiazide  12.5 mg Oral Daily    metoprolol succinate  50 mg Oral Daily    memantine  10 mg Oral BID    sodium chloride flush  10 mL Intravenous 2 times per day    enoxaparin  40 mg Subcutaneous Daily       Data:     Code Status: DNR-CC    History reviewed. No pertinent family history. Social History     Socioeconomic History    Marital status:       Spouse name: Not on file    Number of children: Not on file    Years of education: Not on file    Highest education level: Not on file   Occupational History    Not on file   Social Needs    Financial resource strain: Not on file    Food insecurity     Worry: Not on file     Inability: Not on file    Transportation needs     Medical: Not on file     Non-medical: Not on file   Tobacco Use    Smoking status: Never Smoker    Smokeless tobacco: Never Used   Substance and Sexual Activity    Alcohol use: Never     Frequency: Never    Drug use: Never    Sexual activity: Not on file   Lifestyle    Physical activity     Days per week: Not on file     Minutes per session: Not on file    Stress: Not on file   Relationships    Social connections     Talks on phone: Not on file     Gets together: Not on file     Attends Pentecostalism service: Not on file     Active member of club or organization: Not on file     Attends meetings of clubs or organizations: Not on file     Relationship status: Not on file    Intimate partner violence     Fear of current or ex partner: Not on file     Emotionally abused: Not on file     Physically abused: Not on file     Forced sexual activity: Not on file   Other Topics Concern    Not on file   Social History Narrative    Not on file       Labs:  Hematology:  Recent Labs     10/23/20  1345 10/23/20  1415 10/25/20  0348   WBC  --  7.3 7.1   HGB  --  13.9 14.0   HCT  --  42.4 41.6   PLT  --  248 226   INR 1.03  --   --      Chemistry:  Recent Labs     10/23/20  1345 10/25/20  0348    143   K 4.7 3.8    106   CO2 27 27   GLUCOSE 93 97   BUN 15 13   CREATININE 0.9 1.0*   ANIONGAP 8 10   LABGLOM 59* 52*   GFRAA >59 >59   CALCIUM 10.4* 10.2     Recent Labs     10/23/20  1345 10/25/20  0348   PROT 5.8* 5.6*   LABALBU 3.7 3.6   AST 17 12   ALT 10 9   ALKPHOS 83 84   BILITOT 0.6 0.5       Objective:     Vitals: BP (!) 149/66   Pulse (!) 48   Temp 96.2 °F (35.7 °C) (Temporal)   Resp 18   Ht 5' 5\" (1.651 m)   Wt 183 lb 9 oz (83.3 kg)   SpO2 98%   BMI 30.55 kg/m²      Intake/Output Summary (Last 24 hours) at 10/26/2020 0736  Last data filed at 10/26/2020 0705  Gross per 24 hour   Intake 240 ml   Output 1150 ml   Net -910 ml    Temp (24hrs), Av.7 °F (35.9 °C), Min:96.2 °F (35.7 °C), Max:97.5 °F (36.4 °C)    Glucose:  No results for input(s): POCGLU in the last 72 hours.   Physical Examination:   General appearance -awake alert elderly and frail appearing  Mouth - mucous membranes moist, pharynx normal without lesions  Neck - supple, no significant adenopathy  Lymphatics - no palpable lymphadenopathy, no hepatosplenomegaly  Chest - clear to auscultation, no wheezes, rales or rhonchi, symmetric air entry, no tachypnea, retractions or cyanosis  Heart - normal rate, regular rhythm, normal S1, S2, no murmurs, rubs, clicks or gallops  Abdomen - soft, nontender, nondistended, no masses or organomegaly bowel sounds normal  Neurological -awake alert conversational but not oriented  Musculoskeletal - no joint tenderness, deformity or swelling  Extremities - peripheral pulses normal, no pedal edema, no clubbing or cyanosis  Skin - normal coloration and turgor, no rashes, no suspicious skin lesions noted      Assessment and Plan:     Primary Problem:  UTI (urinary tract infection)    Hospital Problem list:  Principal Problem:    UTI (urinary tract infection)  Active Problems:    Febrile illness--resolved    Bradycardia--continue cardiac monitoring    Vascular dementia--continue with Namenda    CKD (chronic kidney disease), stage III--monitor I&O's, labs at baseline    Essential hypertension--Norvasc, Toprol, Zestril, HCTZ add as needed clonidine    Infectious encephalopathy--improved    Treatment Plan:  Recommendations as above   continue with IV Zosyn, awaiting sensitivities. Cultures noted  Physical therapy evaluation  Per family was ambulatory without assistance   Monitor BP and heart rate, adjust medications as necessary  Change to oral antibiotics and plan for discharge tomorrow. Conference with family at bedside    Discharge plannin[address] [place] [address]--the [address] [place] [id number] with the patient, nursing staff and family  30 minutes spent in face to face interaction and coordination of care. Electronically signed by Katia Quiroga PA-C on 10/26/2020 at 7:36 AM       Urinary tract infection present on admission. Patient started [facility] at the [facility]. Patient continued decline. Admitted for IV antibiotics. Patient currently refusing oral medications-family conference. Offered dop-off or G-tube but not good options as patient would probably remove them.     Encourage compliance with medicines. Conference with family at bedside. Consult physical therapy to help with ambulation. Hopefully return to assisted care living in 1-2 days. I have discussed the care of Mary Mcmullen, including pertinent history and exam findings with the ARNP/PA. I have seen and examined the patient and the key elements of all parts of the encounter have been performed by me. I agree with the assessment and plan as outlined by the ARNP/PA. Please refer to my separate note for complete documentation.      Electronically signed by Travis Vee MD on 10/26/2020 at 8:34 AM

## 2020-10-26 NOTE — PROGRESS NOTES
Palliative Care/Spiritual Care: Met with pt and pt's daughter to initiate Palliative care. Pt's daughter says pt has a UTI and dementia. Pt is known to palliative care. Advance Directives: Daughter says pt has a POA her daughter Daya Lee is POA. Reviewed pt's ACP note and there are no changes. Pain/other symptoms: Pt says she is not having pain. Social/Spiritual:  Pt says she attended [employer].        Pt/family discussion r/t goals: Pt has two daughters, grandchildren, and great-grandchildren. Pt came from the [address] [place] [address]. Pt ambulates with a walker, and feeds hers but needs assistance with her shower and toilet needs. Pt's goal is for pt to return to the [facility] with previous quality of life and previous daily living skills. Provided spiritual care with sustaining presence, nurtured hope, and prayer. Pt expressed gratitude for spiritual care.       Electronically signed by Arden Rouse on 10/26/2020 at 3:02 PM

## 2020-10-26 NOTE — CARE COORDINATION
Patient is current with [facility] for  [facility]. Will follow. Please advise when patient discharges. WILL NEED [facility] ORDERS TO RESUME HH SERVICES WHEN PATIENT DISCHARGES. Thank you. [address] [place] [address] [phone]. [phone].     Cuca Ham RN, Home Care Liaison  [phone] P  Electronically signed by Wilfred Palomino RN on 10/26/2020 at 10:18 AM

## 2020-10-27 VITALS
TEMPERATURE: 97.2 F | BODY MASS INDEX: 30.58 KG/M2 | HEIGHT: 65 IN | HEART RATE: 51 BPM | SYSTOLIC BLOOD PRESSURE: 143 MMHG | RESPIRATION RATE: 18 BRPM | OXYGEN SATURATION: 94 % | WEIGHT: 183.56 LBS | DIASTOLIC BLOOD PRESSURE: 59 MMHG

## 2020-10-27 LAB — SARS-COV-2, PCR: NOT DETECTED

## 2020-10-27 PROCEDURE — U0003 INFECTIOUS AGENT DETECTION BY NUCLEIC ACID (DNA OR RNA); SEVERE ACUTE RESPIRATORY SYNDROME CORONAVIRUS 2 (SARS-COV-2) (CORONAVIRUS DISEASE [COVID-19]), AMPLIFIED PROBE TECHNIQUE, MAKING USE OF HIGH THROUGHPUT TECHNOLOGIES AS DESCRIBED BY CMS-2020-01-R: HCPCS

## 2020-10-27 PROCEDURE — 6360000002 HC RX W HCPCS: Performed by: FAMILY MEDICINE

## 2020-10-27 PROCEDURE — 2580000003 HC RX 258: Performed by: FAMILY MEDICINE

## 2020-10-27 PROCEDURE — 6370000000 HC RX 637 (ALT 250 FOR IP): Performed by: FAMILY MEDICINE

## 2020-10-27 RX ORDER — TETRACYCLINE HYDROCHLORIDE 500 MG/1
500 CAPSULE ORAL
Qty: 14 CAPSULE | Refills: 0 | Status: ON HOLD | OUTPATIENT
Start: 2020-10-27 | End: 2021-08-19

## 2020-10-27 RX ORDER — CEFTRIAXONE 1 G/1
1 INJECTION, POWDER, FOR SOLUTION INTRAMUSCULAR; INTRAVENOUS EVERY 24 HOURS
Status: DISCONTINUED | OUTPATIENT
Start: 2020-10-27 | End: 2020-10-27 | Stop reason: HOSPADM

## 2020-10-27 RX ORDER — CEFTRIAXONE 1 G/1
1 INJECTION, POWDER, FOR SOLUTION INTRAMUSCULAR; INTRAVENOUS EVERY 24 HOURS
Qty: 3 EACH | Refills: 0 | Status: CANCELLED | OUTPATIENT
Start: 2020-10-27

## 2020-10-27 RX ORDER — TETRACYCLINE HYDROCHLORIDE 500 MG/1
500 CAPSULE ORAL
Qty: 14 CAPSULE | Refills: 0 | Status: CANCELLED | OUTPATIENT
Start: 2020-10-27

## 2020-10-27 RX ORDER — CEFTRIAXONE 1 G/1
1 INJECTION, POWDER, FOR SOLUTION INTRAMUSCULAR; INTRAVENOUS EVERY 24 HOURS
Qty: 3 EACH | Refills: 0 | Status: ON HOLD | OUTPATIENT
Start: 2020-10-27 | End: 2021-08-19

## 2020-10-27 RX ORDER — TETRACYCLINE HYDROCHLORIDE 250 MG/1
500 CAPSULE ORAL
Status: DISCONTINUED | OUTPATIENT
Start: 2020-10-27 | End: 2020-10-27 | Stop reason: HOSPADM

## 2020-10-27 RX ADMIN — ASPIRIN 81 MG: 81 TABLET, CHEWABLE ORAL at 09:04

## 2020-10-27 RX ADMIN — MEMANTINE HYDROCHLORIDE 10 MG: 5 TABLET ORAL at 09:02

## 2020-10-27 RX ADMIN — ESCITALOPRAM OXALATE 20 MG: 10 TABLET ORAL at 09:02

## 2020-10-27 RX ADMIN — LISINOPRIL 40 MG: 20 TABLET ORAL at 09:46

## 2020-10-27 RX ADMIN — PIPERACILLIN SODIUM AND TAZOBACTAM SODIUM 3.38 G: 3; .375 INJECTION, POWDER, LYOPHILIZED, FOR SOLUTION INTRAVENOUS at 03:10

## 2020-10-27 RX ADMIN — MEMANTINE HYDROCHLORIDE 10 MG: 5 TABLET ORAL at 00:30

## 2020-10-27 RX ADMIN — AMLODIPINE BESYLATE 5 MG: 5 TABLET ORAL at 09:46

## 2020-10-27 RX ADMIN — ALLOPURINOL 100 MG: 100 TABLET ORAL at 09:04

## 2020-10-27 RX ADMIN — HYDROCHLOROTHIAZIDE 12.5 MG: 12.5 CAPSULE ORAL at 09:04

## 2020-10-27 NOTE — CARE COORDINATION
[address] [address] notified of patient discharge today and CALEB order. DC Summary and AVS faxed.   Electronically signed by Crow Lozano RN on 10/27/20 at 2:40 PM CDT

## 2020-10-27 NOTE — ADT AUTH CERT
Utilization Reviews         Urinary Tract Infection (UTI) - Care Day 4 (10/26/2020) by Moises Ku RN         Review Status  Review Entered    Completed  10/27/2020 12:35        Criteria Review       Care Day: 4 Care Date: 10/26/2020 Level of Care: Inpatient Floor    Guideline Day 3    Clinical Status    (X) * Hemodynamic stability    10/27/2020 1:35 PM EDT by Juan Null      VS- 96.9 14 48 162/70    (X) * Mental status at baseline    10/27/2020 1:35 PM EDT by Tika Underwood at bedside, seems to be close to baseline mentally    ( ) * Antibiotic regimen for next level of care established    ( ) * Urine output adequate    ( ) * Renal function at baseline or acceptable for next level of care    (X) * Pain absent or managed    10/27/2020 1:35 PM EDT by Juan Null      managed    ( ) * Oral intake adequate    (X) * Afebrile or temperature acceptable for next level of care    10/27/2020 1:35 PM EDT by Juan Null      afebrile    (X) * Vomiting absent    ( ) * Discharge plans and education understood    Activity    ( ) * Ambulatory or acceptable for next level of care    Routes    (X) * Oral hydration, medications, [J] and diet    10/27/2020 1:35 PM EDT by Juan Null      tolerating Po and diet    Medications    (X) Antibiotics [K]    10/27/2020 1:35 PM EDT by Juan Null      Zosyn 3.375mg iv x3    * Milestone    Additional Notes    10/26       Meds: Allopurinol 100mg po x1, Norvasc 5mg po x1, Asa 81mg po x1, , Lovenox 40mg sc x1, lexapro 20mg po x1, Microzide 12.5mg po x1, Namenda 10mg po x1, Toprol 50mg po x1,    Interval History:      Reviewed overnight events and nursing notes.     Status:  improved    No reports of pain    Denies shortness of breath    Rested well last night    No chest pain reported       Physical Examination:    General appearance -awake alert elderly and frail appearing    Mouth - mucous membranes moist, pharynx normal without lesions    Neck - supple, no significant adenopathy    Lymphatics - no palpable lymphadenopathy, no hepatosplenomegaly    Chest - clear to auscultation, no wheezes, rales or rhonchi, symmetric air entry, no tachypnea, retractions or cyanosis    Heart - normal rate, regular rhythm, normal S1, S2, no murmurs, rubs, clicks or gallops    Abdomen - soft, nontender, nondistended, no masses or organomegaly bowel sounds normal    Neurological -awake alert conversational but not oriented    Musculoskeletal - no joint tenderness, deformity or swelling    Extremities - peripheral pulses normal, no pedal edema, no clubbing or cyanosis    Skin - normal coloration and turgor, no rashes, no suspicious skin lesions noted       Assessment and Plan:         Primary Problem:    UTI (urinary tract infection)         Hospital Problem list:    Principal Problem:      UTI (urinary tract infection)    Active Problems:      Febrile illness--resolved      Bradycardia--continue cardiac monitoring      Vascular dementia--continue with Namenda      CKD (chronic kidney disease), stage III--monitor I&O's, labs at baseline      Essential hypertension--Norvasc, Toprol, Zestril, HCTZ add as needed clonidine      Infectious encephalopathy--improved         Treatment Plan:    Recommendations as above    continue with IV Zosyn, awaiting sensitivities.  Cultures noted    Physical therapy evaluation    Per family was ambulatory without assistance    Monitor BP and heart rate, adjust medications as necessary    Change to oral antibiotics and plan for discharge tomorrow.     Conference with family at bedside         Discharge plannin[address] [place] [address]--the [address] with the patient, nursing staff and family    30 minutes spent in face to face interaction and coordination of care.         Electronically signed by Isaiah Carter PA-C on 10/26/2020 at 7:36 AM              Urinary tract infection present on admission.  Patient started Omnice[facility] at the [facility] assisted living.  Patient continued decline.  Admitted for IV antibiotics.         Patient currently refusing oral medications-family conference.  Offered dop-off or G-tube but not good options as patient would probably remove them.         Encourage compliance with medicines. Conference with family at bedside.     Consult physical therapy to help with ambulation.         Hopefully return to assisted care living in 1-2 days.              Urinary Tract Infection (UTI) - Care Day 3 (10/25/2020) by Yogesh Spicer RN         Review Status  Review Entered    Completed  10/27/2020 12:11        Criteria Review       Care Day: 3 Care Date: 10/25/2020 Level of Care: Inpatient Floor    Guideline Day 3    Clinical Status    (X) * Hemodynamic stability    10/27/2020 1:11 PM EDT by Skip Lizarraga      VS- 97.5 20 60 150/72 97%    ( ) * Mental status at baseline    ( ) * Antibiotic regimen for next level of care established    ( ) * Urine output adequate    ( ) * Renal function at baseline or acceptable for next level of care    (X) * Pain absent or managed    10/27/2020 1:11 PM EDT by Skip Lizarraga      no pain    ( ) * Oral intake adequate    (X) * Afebrile or temperature acceptable for next level of care    10/27/2020 1:11 PM EDT by Skip Lizarraga      afebrile    (X) * Vomiting absent    10/27/2020 1:11 PM EDT by Jose Otero none noted    ( ) * Discharge plans and education understood    Activity    ( ) * Ambulatory or acceptable for next level of care    Routes    (X) * Oral hydration, medications, [J] and diet    10/27/2020 1:11 PM EDT by Skip Lizararga      taking po meds    Medications    (X) Antibiotics [K]    10/27/2020 1:11 PM EDT by Skip Lizarraga      Rocephin 1 gm iv x1    * Milestone    Additional Notes    10/25       Creatinine 1.0 (H)    GFR Non- 52 (A)    GFR  >59    Calcium 10.2    Total Protein 5.6 (L)       Meds: Allopurinol 100mg po x1, coli.  Previous UTI was sensitive to Rocephin.  Currently on Rocephin 1 g daily.  Sensitivities pending.  Culture indicates multidrug resistance.     Plan:    -Changes Zosyn until sensitivities are complete given multidrug-resistant organism.  Patient is improving on Rocephin with downtrending white count no further fevers.  Will likely be able to narrow coverage once sensitivities are obtained.         Vascular dementia    Patient with vascular dementia now infectious encephalopathy secondary to UTI.  Treating as above.         CKD stage III    Creatinine stable.  Will monitor daily         Essential hypertension    Continue home medications         Disposition: Continue inpatient care awaiting sensitivities

## 2020-10-27 NOTE — CARE COORDINATION
Pt is a resident at [facility]. She is active with [address] [facility], [address]. Please run COVID on pt at [place].      [facility]  [phone]    Electronically signed by Francy Hernandez on 10/27/2020 at 8:31 AM

## 2020-10-28 LAB
BLOOD CULTURE, ROUTINE: NORMAL
CULTURE, BLOOD 2: NORMAL

## 2020-10-30 ENCOUNTER — APPOINTMENT (OUTPATIENT)
Dept: GENERAL RADIOLOGY | Facility: HOSPITAL | Age: 85
End: 2020-10-30

## 2020-10-30 ENCOUNTER — APPOINTMENT (OUTPATIENT)
Dept: CT IMAGING | Facility: HOSPITAL | Age: 85
End: 2020-10-30

## 2020-10-30 ENCOUNTER — HOSPITAL ENCOUNTER (EMERGENCY)
Facility: HOSPITAL | Age: 85
Discharge: NURSING, REHAB OR LONG-TERM CARE | End: 2020-10-31
Admitting: FAMILY MEDICINE

## 2020-10-30 DIAGNOSIS — N17.9 AKI (ACUTE KIDNEY INJURY) (HCC): ICD-10-CM
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
## 2020-10-30 DIAGNOSIS — S01.111A LACERATION OF RIGHT EYEBROW, INITIAL ENCOUNTER: ICD-10-CM

## 2020-10-30 LAB
ALBUMIN SERPL-MCNC: 4 G/DL (ref 3.5–5.2)
ALBUMIN/GLOB SERPL: 1.6 G/DL
ALP SERPL-CCNC: 99 U/L (ref 39–117)
ALT SERPL W P-5'-P-CCNC: 12 U/L (ref 1–33)
ANION GAP SERPL CALCULATED.3IONS-SCNC: 11 MMOL/L (ref 5–15)
AST SERPL-CCNC: 20 U/L (ref 1–32)
BASOPHILS # BLD AUTO: 0.08 10*3/MM3 (ref 0–0.2)
BASOPHILS NFR BLD AUTO: 0.8 % (ref 0–1.5)
BILIRUB SERPL-MCNC: 0.2 MG/DL (ref 0–1.2)
BUN SERPL-MCNC: 32 MG/DL (ref 8–23)
BUN/CREAT SERPL: 23.4 (ref 7–25)
CALCIUM SPEC-SCNC: 10.7 MG/DL (ref 8.6–10.5)
CHLORIDE SERPL-SCNC: 102 MMOL/L (ref 98–107)
CO2 SERPL-SCNC: 29 MMOL/L (ref 22–29)
CREAT SERPL-MCNC: 1.37 MG/DL (ref 0.57–1)
DEPRECATED RDW RBC AUTO: 43.8 FL (ref 37–54)
EOSINOPHIL # BLD AUTO: 0.16 10*3/MM3 (ref 0–0.4)
EOSINOPHIL NFR BLD AUTO: 1.5 % (ref 0.3–6.2)
ERYTHROCYTE [DISTWIDTH] IN BLOOD BY AUTOMATED COUNT: 13.8 % (ref 12.3–15.4)
GFR SERPL CREATININE-BSD FRML MDRD: 36 ML/MIN/1.73
GLOBULIN UR ELPH-MCNC: 2.5 GM/DL
GLUCOSE SERPL-MCNC: 113 MG/DL (ref 65–99)
HCT VFR BLD AUTO: 43 % (ref 34–46.6)
HGB BLD-MCNC: 14.5 G/DL (ref 12–15.9)
IMM GRANULOCYTES # BLD AUTO: 0.05 10*3/MM3 (ref 0–0.05)
IMM GRANULOCYTES NFR BLD AUTO: 0.5 % (ref 0–0.5)
INR PPP: 1.05 (ref 0.91–1.09)
LYMPHOCYTES # BLD AUTO: 1.9 10*3/MM3 (ref 0.7–3.1)
LYMPHOCYTES NFR BLD AUTO: 18.1 % (ref 19.6–45.3)
MCH RBC QN AUTO: 29.9 PG (ref 26.6–33)
MCHC RBC AUTO-ENTMCNC: 33.7 G/DL (ref 31.5–35.7)
MCV RBC AUTO: 88.7 FL (ref 79–97)
MONOCYTES # BLD AUTO: 0.64 10*3/MM3 (ref 0.1–0.9)
MONOCYTES NFR BLD AUTO: 6.1 % (ref 5–12)
NEUTROPHILS NFR BLD AUTO: 7.68 10*3/MM3 (ref 1.7–7)
NEUTROPHILS NFR BLD AUTO: 73 % (ref 42.7–76)
NRBC BLD AUTO-RTO: 0 /100 WBC (ref 0–0.2)
PLATELET # BLD AUTO: 294 10*3/MM3 (ref 140–450)
PMV BLD AUTO: 10.9 FL (ref 6–12)
POTASSIUM SERPL-SCNC: 3.9 MMOL/L (ref 3.5–5.2)
PROT SERPL-MCNC: 6.5 G/DL (ref 6–8.5)
PROTHROMBIN TIME: 13.3 SECONDS (ref 11.9–14.6)
RBC # BLD AUTO: 4.85 10*6/MM3 (ref 3.77–5.28)
SODIUM SERPL-SCNC: 142 MMOL/L (ref 136–145)
WBC # BLD AUTO: 10.51 10*3/MM3 (ref 3.4–10.8)

## 2020-10-30 PROCEDURE — 85610 PROTHROMBIN TIME: CPT | Performed by: PHYSICIAN ASSISTANT

## 2020-10-30 PROCEDURE — 25010000003 LIDOCAINE 1 % SOLUTION: Performed by: PHYSICIAN ASSISTANT

## 2020-10-30 PROCEDURE — 73523 X-RAY EXAM HIPS BI 5/> VIEWS: CPT

## 2020-10-30 PROCEDURE — P9612 CATHETERIZE FOR URINE SPEC: HCPCS

## 2020-10-30 PROCEDURE — 85025 COMPLETE CBC W/AUTO DIFF WBC: CPT | Performed by: PHYSICIAN ASSISTANT

## 2020-10-30 PROCEDURE — 99284 EMERGENCY DEPT VISIT MOD MDM: CPT

## 2020-10-30 PROCEDURE — 70450 CT HEAD/BRAIN W/O DYE: CPT

## 2020-10-30 PROCEDURE — 80053 COMPREHEN METABOLIC PANEL: CPT | Performed by: PHYSICIAN ASSISTANT

## 2020-10-30 PROCEDURE — 72125 CT NECK SPINE W/O DYE: CPT

## 2020-10-30 RX ORDER — LIDOCAINE HYDROCHLORIDE 10 MG/ML
10 INJECTION, SOLUTION INFILTRATION; PERINEURAL ONCE
Status: COMPLETED | OUTPATIENT
Start: 2020-10-30 | End: 2020-10-30

## 2020-10-30 RX ORDER — ACETAMINOPHEN 500 MG
500 TABLET ORAL ONCE
Status: COMPLETED | OUTPATIENT
Start: 2020-10-30 | End: 2020-01-01

## 2020-10-30 RX ORDER — SODIUM CHLORIDE 0.9 % (FLUSH) 0.9 %
10 SYRINGE (ML) INJECTION AS NEEDED
Status: DISCONTINUED | OUTPATIENT
Start: 2020-10-30 | End: 2020-01-01 | Stop reason: HOSPADM

## 2020-10-30 RX ADMIN — LIDOCAINE HYDROCHLORIDE 10 ML: 10 INJECTION, SOLUTION INFILTRATION; PERINEURAL at 23:22

## 2020-10-30 RX ADMIN — SODIUM CHLORIDE 1000 ML: 9 INJECTION, SOLUTION INTRAVENOUS at 23:22

## 2020-10-31 LAB
CREAT SERPL-MCNC: 1.1 MG/DL (ref 0.5–0.9)
GFR AFRICAN AMERICAN: 57
GFR NON-AFRICAN AMERICAN: 47

## 2020-11-08 PROBLEM — I26.09 ACUTE PULMONARY EMBOLISM WITH ACUTE COR PULMONALE (HCC): Status: ACTIVE | Noted: 2020-01-01

## 2020-11-09 PROBLEM — G30.1 ALZHEIMER'S DEMENTIA, LATE ONSET, WITH BEHAVIORAL DISTURBANCE (HCC): Chronic | Status: ACTIVE | Noted: 2020-01-01

## 2020-11-09 PROBLEM — I50.23 ACUTE ON CHRONIC SYSTOLIC CONGESTIVE HEART FAILURE (HCC): Chronic | Status: ACTIVE | Noted: 2020-01-01

## 2020-11-09 PROBLEM — F02.818 ALZHEIMER'S DEMENTIA, LATE ONSET, WITH BEHAVIORAL DISTURBANCE (HCC): Chronic | Status: ACTIVE | Noted: 2020-01-01

## 2020-11-09 PROBLEM — I50.32 CHRONIC DIASTOLIC CHF (CONGESTIVE HEART FAILURE) (HCC): Chronic | Status: ACTIVE | Noted: 2020-01-01

## 2020-11-09 PROBLEM — I10 ESSENTIAL HYPERTENSION: Chronic | Status: ACTIVE | Noted: 2020-01-01

## 2020-11-09 NOTE — PROGRESS NOTES
Discharge Planning Assessment  [facility]     Patient Name: Nichole Mcgregor  MRN: [id number]  Today's Date: 11/9/2020    Admit Date: 11/8/2020    Discharge Needs Assessment     Row Name 11/09/20 1124       Living Environment    Lives With  facility resident    Current Living Arrangements  independent/assisted living facility    Duration at Residence  Pt resides at [facility] and plans to return.  Phone: [phone].    Primary Care Provided by  self    Provides Primary Care For  no one    Quality of Family Relationships  helpful;involved;supportive    Able to Return to Prior Arrangements  yes       Resource/Environmental Concerns    Resource/Environmental Concerns  none       Transition Planning    Patient/Family Anticipated Services at Transition  home health care    Transportation Anticipated  family or friend will provide       Discharge Needs Assessment    Readmission Within the Last 30 Days  no previous admission in last 30 days    Equipment Currently Used at Home  walker, rolling;wheelchair    Concerns to be Addressed  no discharge needs identified    Anticipated Changes Related to Illness  none    Equipment Needed After Discharge  none    Outpatient/Agency/Support Group Needs  homecare agency Pt is current with [facility].  They will need resumption of care orders upon dc.    Current Discharge Risk  chronically ill    Discharge Coordination/Progress  SW spoke to pt's daughter, Loly.  She stated pt plans to dc back to [place] and resume [facility].  Pt has PCP and RX coverage.  Pt can afford medications.  SW will follow.        Discharge Plan    No documentation.       Continued Care and Services - Admitted Since 11/8/2020    Coordination has not been started for this encounter.         Demographic Summary    No documentation.       Functional Status    No documentation.       Psychosocial    No documentation.       Abuse/Neglect    No documentation.       Legal    No documentation.       Substance Abuse    No  documentation.       Patient Forms    No documentation.           ABNER RuizW

## 2020-11-09 NOTE — PAYOR COMM NOTE
"11/9/20 [facility] [phone]  [phone]      [id number]        Nichole Green (87 y.o. Female)     Date of Birth Social Security Number Address Home Phone MRN    03/31/1933  [place] [address] [address] [address]  [address] [facility] [address] [phone] [id number]    Yazidism Marital Status          Orthodox        Admission Date Admission Type Admitting Provider Attending Provider Department, Room/Bed    11/8/20 Emergency Arthur Fine MD Eickholz, James Noah, MD [facility] 4B, 447/1    Discharge Date Discharge Disposition Discharge Destination                       Attending Provider: Arthur Fine MD    Allergies: Sulfa Antibiotics    Isolation: None   Infection: None   Code Status: No CPR    Ht: 160 cm (62.99\")   Wt: 82.6 kg (182 lb 1.6 oz)    Admission Cmt: None   Principal Problem: None                Active Insurance as of 11/8/2020     Primary Coverage     Payor Plan Insurance Group Employer/Plan Group    HUMANA MEDICARE REPLACEMENT HUMANA MEDICARE REPLACEMENT [id number]     Payor Plan Address Payor Plan Phone Number Payor Plan Fax Number Effective Dates    [address] [address] [phone]  1/1/2018 - None Entered    [facility] [id number]       Subscriber Name Subscriber Birth Date Member ID       NICHOLE GREEN 3/31/1933 [id number]                 Emergency Contacts      (Rel.) Home Phone Work Phone Mobile Phone    KARUNA BURTON (Power of ) [phone] -- --    RA DOMINGUEZ (Daughter) [phone] -- --    ELIEZERYARA (Daughter) [phone] -- --         Department Encounter #   11/8/2020  5:52 PM [address] [id number]   Kelin Lopez APRN   Nurse Practitioner   Emergency Medicine   ED Provider Notes   Attested   Date of Service:  11/09/20 0012   Creation Time:  11/09/20 0012            Attested        Attestation signed by López Vickers MD at 11/09/20 0626            For this patient " encounter, I reviewed the NP or PA documentation, treatment plan, and medical decision making. López Vickers MD 11/9/2020 06:26 CST      Expand All Collapse All      Show:Clear all  [x]Manual[x]Template[]Copied    Added by:  [x]Kelin Lopez APRN    []Joe for details     Subjective      Patient is a very pleasant 87-year-old female who presents from the [facility] with complaint of increased confusion and generalized weakness.  The patient has a history of dementia.  She is unable to answer any my questions appropriately.  The patient is able to tell me her name but is unable to tell me her birthday.  She is very pleasant and will talk about other subjects but is unable to tell me what is going on today.  Per EMS report the patient was more confused earlier today.  They state that the patient had a fall approximately 1 week ago but was seen at this ER and had a normal CT scan of the head.  The patient had been doing well until today.  They stated that patient seem more confused.  She has not had any further recent falls.  They state that the patient normally is able to use her walker and walk to the dining [place] and eat and drink on her own but does require some assistance with activities such as bathing.  They state that the patient would not get out of her chair today and did not eat or drink.  Stated this is quite unusual for the patient so she was brought to the ER for further evaluation.  Again the patient is unable to tell me any of her current symptoms.  She is not in any acute distress.  She presents here today for further evaluation.  I discussed the patient with her daughter who verifies that this is the current history on the patient.                    History provided by:  EMS personnel, nursing home and relative  History limited by:  Dementia   used: No    Altered Mental Status  Presenting symptoms: behavior changes and lethargy    Severity:  Mild  Most recent  episode:  Today  Episode history:  Continuous  Duration:  1 day  Timing:  Constant  Progression:  Unchanged  Chronicity:  New  Context: dementia and nursing home resident    Context: not alcohol use, not drug use, not head injury, not homeless, taking medications as prescribed, not recent change in medication, not recent illness and not recent infection    Associated symptoms: no abdominal pain, normal movement, no agitation, no bladder incontinence, no decreased appetite, no depression, no difficulty breathing, no eye deviation, no fever, no hallucinations, no headaches, no light-headedness, no nausea, no palpitations, no rash, no seizures, no slurred speech, no suicidal behavior, no visual change, no vomiting and no weakness       Review of Systems   Unable to perform ROS: Dementia   Constitutional: Negative for decreased appetite and fever.   Cardiovascular: Negative for palpitations.   Gastrointestinal: Negative for abdominal pain, nausea and vomiting.   Genitourinary: Negative for bladder incontinence.   Skin: Negative for rash.   Neurological: Negative for seizures, weakness, light-headedness and headaches.   Psychiatric/Behavioral: Negative for agitation and hallucinations.         History        Past Medical History:   Diagnosis Date   • Dementia (CMS/HCC)     • Hypertension          Physical Exam  Vitals signs and nursing note reviewed.   Constitutional:       Appearance: She is well-developed.   HENT:      Head: Normocephalic and atraumatic.   Eyes:      Pupils: Pupils are equal, round, and reactive to light.   Neck:      Musculoskeletal: Normal range of motion.   Cardiovascular:      Rate and Rhythm: Bradycardia present.   Pulmonary:      Effort: Pulmonary effort is normal.      Breath sounds: Normal breath sounds.   Abdominal:      General: Bowel sounds are normal.      Palpations: Abdomen is soft.   Musculoskeletal: Normal range of motion.   Skin:     General: Skin is warm and dry.      Capillary  Refill: Capillary refill takes less than 2 seconds.   Neurological:      Mental Status: She is alert. She is confused.          Nov 09 0018   Mon Nov 09, 2020 0016 The patient had a slightly elevated troponin.  Repeat troponin was the same.  EKG x2 showed no acute changes associated with a STEMI.  I did note that the patient's oxygen saturation at 1 time was 93% on room air.  It is documented that her oxygen saturation got down to 83% however this is an erroneous reading.  I was advised this by the nursing staff.  The patient had elevated D-dimer and was taken to CT scan.  She has bilateral PEs.  There is also a 12 mm opacity that was noted in the chest that will need follow-up and of advised the patient's daughter of this.  Patient was given Lovenox for the PEs.  I called and discussed her case with Dr. Platt, cardiologist on-call.  At this time the patient does not need to go to [facility].  He is advised the patient should be admitted to her primary care provider and does not need to go to the intensive care unit as she has stable vital signs.  I called and spoke with Dr. Lagunas, who is on-call for the patient's primary care provider.  She is okay with the patient being admitted to the telemetry floor.  I have placed an order for BMP in the morning as well as an echocardiogram.  She states that this may be done in the a.m.  The patient will be admitted at this time in stable condition.  Advised the patient and her daughter of all findings and the patient's daughters agree with plan of care.    [LF]       ED Course User Index  [LF] Kelin Lopez APRN Eickholz, James Noah, MD   Physician   Medicine   H&P   Signed   Date of Service:  11/09/20 0817   Creation Time:  11/09/20 0817            Signed        Expand All Collapse All      Show:Clear all  [x]Manual[x]Template[]Copied    Added by:  [x]Arthur Fine MD    []Joe for details  History and Physical        CHIEF COMPLAINT: Not acting  right     Reason for Admission: DVT/PE     History Obtained From: Daughter at bedside     HISTORY OF PRESENT ILLNESS:       The patient is a 87 y.o. female with significant past medical history of late onset dementia with behavioral disturbances residing in local assisted care who presents with increased shortness of breath and not acting right.  Patient seen the emergency room CTA of the chest revealed bilateral pulmonary embolus.  Patient was not in respiratory distress.  Patient admitted after dose of Lovenox given in the emergency room.  Carlton and open conversation held with daughter at bedside in an 87-year-old lady with significant dementia our treatment plan.  DNR and treated in a conservative manner.  No other precipitating or relieving factors noted.     Past Medical History:    Medical History               Vital Signs   Temp:  [97.4 °F (36.3 °C)-98.4 °F (36.9 °C)] 97.4 °F (36.3 °C)  Heart Rate:  [47-92] 92  Resp:  [16-18] 18  BP: (113-153)/(44-82) 153/57            Physical Exam:  Constitutional: The patient is not oriented to person, place, or time.  She is talking and incoherent speech ,they appear well-developed.   HEENT:   Head: Normocephalic and atraumatic.   Eyes: Pupils are equal, round, and reactive to light.   Neck: Neck supple without masses or carotid bruit.  Cardiovascular: Regular rhythm and normal heart sounds.    Pulmonary/Chest: Effort normal and breath sounds normal. CTAB  Abdominal: Soft. Bowel sounds are normal. There is  no distension or  Tenderness appreciated. There is no rebound and no guarding.   Musculoskeletal: Normal range of motion. There is  no edema or tenderness. No sign of blood clot  Neurological: Patient is at her baseline.  Incoherent speech at times.  Tangential talking.  Does not recognize me or her daughter at bedside.  Not oriented.    Skin: Skin is warm and dry without significant rashes      Results Review:              I        Procedures      Value Units  Date/Time       Basic Metabolic Panel [[id number]]  (Abnormal) Collected: 11/09/20 0455     Specimen: Blood Updated: 11/09/20 0536       Glucose 79 mg/dL         BUN 34 mg/dL         Creatinine 1.20 mg/dL         Sodium 141 mmol/L         Potassium 3.5 mmol/L         Comment: Slight hemolysis detected by analyzer. Results may be affected.          Chloride 104 mmol/L         CO2 29.0 mmol/L         Calcium 11.4 mg/dL         eGFR Non African Amer 42 mL/min/1.73         BUN/Creatinine Ratio 28.3       Anion Gap 8.0 mmol/L       Narrative:       GFR Normal >60  Chronic Kidney Disease <60  Kidney Failure <15        Troponin [[id number]]  (Abnormal) Collected: 11/08/20 2042     Specimen: Blood Updated: 11/08/20 2109       Troponin T 0.032         taking Biotin.         D-dimer, Quantitative [[id number]]  (Abnormal) Collected: 11/08/20 2016     Specimen: Blood Updated: 11/08/20 2033       D-Dimer, Quantitative 2.54 mg/L (FEU)       Narrative:       Reference Range is 0-0.50 mg/L FEU. However, results      Catch [[id number]]  (Abnormal) Collected: 11/08/20 1926      Specimen: Urine, Clean Catch Updated: 11/08/20 1953       RBC, UA 3-5 /HPF         WBC, UA None Seen /HPF         Bacteria, UA None Seen /HPF         Squamous Epithelial Cells, UA 3-6 /HPF         Hyaline Casts, UA None Seen /LPF         Methodology Manual       1.0 E.U./dL       Comprehensive Metabolic Panel [[id number]]  (Abnormal) Collected: 11/08/20 1838     Specimen: Blood Updated: 11/08/20 1929       Glucose 88 mg/dL         BUN 38 mg/dL         Creatinine 1.38 mg/dL         Sodium 141 mmol/L         Potassium 3.8 mmol/L         Chloride 105 mmol/L         CO2 29.0 mmol/L         Calcium 11.3 mg/dL         Total Protein 5.7 g/dL         Albumin 3.30 g/dL         ALT (SGPT) 16 U/L         AST (SGOT) 33 U/L         Alkaline Phosphatase 123 U/L         Total Bilirubin 0.5 mg/dL         eGFR Non African Amer 36 mL/min/1.73         Globulin 2.4 gm/dL         A/G  Ratio 1.4 g/dL         BUN/Creatinine Ratio 27.5       Anion Gap 7.0 mmol/L       Narrative:       GFR Normal >60  Chronic Kidney Disease <60  Kidney Failure <15        Troponin [[id number]]  (Abnormal) Collected: 11/08/20 1838     Specimen: Blood Updated: 11/08/20 1912       Troponin T 0.032 ng/mL       Narrative:        Updated: 11/08/20 1850        Site Left Radial       Cipriano's Test Positive       pH, Arterial 7.429 pH units         pCO2, Arterial 43.3 mm Hg         pO2, Arterial 67.8 mm Hg         Comment: 84 Value below reference range          HCO3, Arterial 28.6 mmol/L         Comment: 83 Value above reference range          Base Excess, Arterial 3.7 mmol/L         Comment: 83 Value above reference range          O2 Saturation, Arterial 95.6 %         Temperature 37.0 C         Barometric Pressure for Blood Gas 754 mmHg         Modality Room Air       Ventilator Mode NA       Collected by [id number]       Comment: Meter: [id number]     :  [id number]          pCO2, Temperature Corrected 43.3 mm Hg         pH, Temp Corrected 7.429 pH Units         pO2, Temperature Corrected 67.8         Procedure Component Value Units Date/Time      CT Angiogram Chest [[id number]] Collected: 11/09/20 0736       Updated: 11/09/20 0755     Narrative:       EXAMINATION: CT ANGIOGRAM CHEST-  11/9/2020 7:37 AM CST      HISTORY: Elevated d-dimer, altered mental status     COMPARISON: None.      DLP: 309 mGy cm     In order to have a CT radiation dose as low as reasonably achievable,  Automated Exposure Control was utilized for adjustment of the mA and/or  KV according to patient size.     TECHNIQUE: Helical tomographic images of the chest were obtained after  the administration of intravenous contrast following angiogram protocol.  Additionally,  reconstructions in the coronal and sagittal planes and 3D  reconstructions were provided.        FINDINGS:    BASE OF THE NECK: The imaged portion of the base of the neck  appears  unremarkable.     PULMONARY ARTERIES: There is adequate visualization of the pulmonary  arteries. There are multiple filling defects including segmental and  subsegmental branches in the RIGHT middle lobe, LEFT upper lobe,  lingula, and LEFT lower lobe pulmonary arteries. Lobar embolus in the  distal LEFT main pulmonary artery. Borderline RIGHT ventricular diameter  measuring up to 3.6 cm with LEFT ventricular diameter measuring up to  3.9 cm.      LUNGS: Patchy area of groundglass opacity in the LEFT lung apex measures  up to 1.2 cm. Some mild linear atelectasis noted in the lung bases  bilaterally.     AIRWAY: The trachea is patent. Main bronchi are also grossly patent.     CARDIAC: There are some coronary calcifications. The heart is not  enlarged. There is no pericardial effusion.      MEDIASTINUM/LYMPH NODES: No enlarged axillary or mediastinal lymph nodes  are present.      SURROUNDING STRUCTURES: There are degenerative changes in the spine. No  acute osseous abnormality is identified on this examination..      UPPER ABDOMEN: The imaged portion of the upper abdomen appears  unremarkable.            Impression:          1.  Multiple bilateral segmental and subsegmental pulmonary emboli as  well as lobar emboli on the LEFT.     2.  Very mild prominence of the RIGHT ventricle with RV-LV ratio less  remaining than 1.            ASSESSMENT AND PLAN:       1.     Acute pulmonary embolism with acute cor pulmonale (CMS/HCC)    Alzheimer's dementia, late onset, with behavioral disturbance (CMS/HCC)    Essential hypertension    Acute on chronic systolic congestive heart failure (CMS/HCC)    Chronic diastolic CHF (congestive heart failure) (CMS/HCC)    Pulmonary embolus-patient resting comfortably in the bed without oxygen and not in respiratory distress.  Carlton and open discussion held with daughter at bedside that at our age a 50-50 chance of survival with a pulmonary embolus and at nearly 89 years of age that  number increases dramatically.  I would recommend conservative treatment and starting Xarelto at active DVT/PE dosing.  She does not require oxygen at this point in time.  She is a DNR.  Comfort care.  Apparently cardiology was consulted out of the emergency room but I do not think they will have much to offer in this situation.  Offered to scan lower extremities to look for possible DVT but it would not change our treatment plan and could exacerbate patient's restlessness/agitation.  Risk versus benefit discussed with daughter at bedside and she agrees.  Our plan is to treat with Xarelto twice daily for 3 weeks and then daily for 3 months.  Family request treatment in a conservative plan and I agree.  Patient has MOST form filled out in the office.  And daughter confirms DNR/comfort care today.     Arthur Fine MD  08:17 CST 11/9/2020            Current Facility-Administered Medications   Medication Dose Route Frequency Provider Last Rate Last Dose   • amLODIPine (NORVASC) tablet 5 mg  5 mg Oral Q24H Arthur Fine MD   5 mg at 11/09/20 0947    And   • lisinopril (PRINIVIL,ZESTRIL) tablet 20 mg  20 mg Oral Q24H Arthur Fine MD   20 mg at 11/09/20 0947   • escitalopram (LEXAPRO) tablet 20 mg  20 mg Oral Daily Arthur Fine MD   20 mg at 11/09/20 0947   • furosemide (LASIX) tablet 20 mg  20 mg Oral Daily Arthur Fine MD   20 mg at 11/09/20 0947   • memantine (NAMENDA) tablet 10 mg  10 mg Oral Q12H Arthur Fine MD   10 mg at 11/09/20 0947   • metoprolol succinate XL (TOPROL-XL) 24 hr tablet 50 mg  50 mg Oral Daily Arthur Fine MD   50 mg at 11/09/20 0947   • rivaroxaban (XARELTO) tablet 15 mg  15 mg Oral BID With Meals Arthur Fine MD   15 mg at 11/09/20 0947   • sodium chloride 0.9 % flush 10 mL  10 mL Intravenous PRN Kelin Lopez, LEO       • sodium chloride 0.9 % flush 10 mL  10 mL Intravenous Q12H Arthur Fine MD   10 mL at  11/09/20 0946   • sodium chloride 0.9 % flush 10 mL  10 mL Intravenous PRN Arthur Fine MD       • sodium chloride 0.9 % infusion  100 mL/hr Intravenous Continuous Arthur Fine  mL/hr at 11/09/20 0959 100 mL/hr at 11/09/20 0959   • traZODone (DESYREL) tablet 50 mg  50 mg Oral Nightly Arthur Fine MD

## 2020-11-09 NOTE — PLAN OF CARE
Goal Outcome Evaluation:  Plan of Care Reviewed With: patient  Progress: no change   Pt was admitted to us from ED with bilateral PE and elevated troponin's. MD notified for orders, MD stated she already spoke with ER and did not have any other orders for shift. Pt NPO throughout night. Pt is oriented to self but that is all. No c/o pain. Safety maintained. Will cont to monitor and contact with any questions.

## 2020-11-09 NOTE — H&P
History and Physical      CHIEF COMPLAINT: Not acting right    Reason for Admission: DVT/PE    History Obtained From: Daughter at bedside    HISTORY OF PRESENT ILLNESS:      The patient is a 87 y.o. female with significant past medical history of late onset dementia with behavioral disturbances residing in local assisted care who presents with increased shortness of breath and not acting right.  Patient seen the emergency room CTA of the chest revealed bilateral pulmonary embolus.  Patient was not in respiratory distress.  Patient admitted after dose of Lovenox given in the emergency room.  Carlton and open conversation held with daughter at bedside in an 87-year-old lady with significant dementia our treatment plan.  DNR and treated in a conservative manner.  No other precipitating or relieving factors noted.    Past Medical History:    Past Medical History:   Diagnosis Date   • Dementia (CMS/HCC)    • Hypertension        Past Surgical History:    Past Surgical History:   Procedure Laterality Date   • HYSTERECTOMY     • KNEE MENISCAL REPAIR         Medications Prior to Admission:    Medications Prior to Admission   Medication Sig Dispense Refill Last Dose   • amLODIPine-benazepril (LOTREL 5-20) 5-20 MG per capsule Take 1 capsule by mouth Daily. 30 capsule 3    • benzonatate (TESSALON PERLES) 100 MG capsule Take 1 capsule by mouth 3 (Three) Times a Day As Needed for Cough. 30 capsule 1    • busPIRone (BUSPAR) 15 MG tablet Take 15 mg by mouth 3 (Three) Times a Day.      • busPIRone (BUSPAR) 5 MG tablet Take 1 tablet by mouth 3 (Three) Times a Day. 90 tablet 3    • cefdinir (OMNICEF) 300 MG capsule Take 1 capsule by mouth 2 (Two) Times a Day. 20 capsule 0    • ergocalciferol (ERGOCALCIFEROL) 1.25 MG (53388 UT) capsule Take 1 capsule by mouth 2 (Two) Times a Week. 8 capsule 11    • escitalopram (LEXAPRO) 20 MG tablet Take 1 tablet by mouth Daily. 30 tablet 3    • fexofenadine (ALLEGRA ALLERGY) 180 MG tablet Take 1 tablet  by mouth Daily. 30 tablet 5    • furosemide (LASIX) 20 MG tablet Take 1 tablet by mouth Daily. 30 tablet 3    • memantine (NAMENDA XR) 28 MG capsule sustained-release 24 hr extended release capsule Take 1 capsule by mouth Daily. 30 capsule 3    • metoprolol succinate XL (TOPROL-XL) 50 MG 24 hr tablet Take 1 tablet by mouth Daily. 30 tablet 3    • traZODone (DESYREL) 50 MG tablet Take 1 tablet by mouth Every Night. 90 tablet 0        Allergies:  Sulfa antibiotics    Social History:   Social History     Socioeconomic History   • Marital status:      Spouse name: Not on file   • Number of children: Not on file   • Years of education: Not on file   • Highest education level: Not on file   Tobacco Use   • Smoking status: Never Smoker   • Smokeless tobacco: Never Used   Substance and Sexual Activity   • Alcohol use: Never     Frequency: Never   • Drug use: Never   • Sexual activity: Defer       Family History:   History reviewed. No pertinent family history.    REVIEW OF SYSTEMS:    Unable to obtain an accurate review of systems due to significant dementia.  Daughter at bedside states she has been in her usual state of health at the assisted living.  She carries on conversation occasionally with family but most the time is incoherent speech.  She has not been in any significant respiratory distress.  She recently was hospitalized at [facility] for a urinary tract infection.  Those records are reviewed.        Vital Signs   Temp:  [97.4 °F (36.3 °C)-98.4 °F (36.9 °C)] 97.4 °F (36.3 °C)  Heart Rate:  [47-92] 92  Resp:  [16-18] 18  BP: (113-153)/(44-82) 153/57          Physical Exam:  Constitutional: The patient is not oriented to person, place, or time.  She is talking and incoherent speech ,they appear well-developed.   HEENT:   Head: Normocephalic and atraumatic.   Eyes: Pupils are equal, round, and reactive to light.   Neck: Neck supple without masses or carotid bruit.  Cardiovascular: Regular rhythm and normal heart  sounds.    Pulmonary/Chest: Effort normal and breath sounds normal. CTAB  Abdominal: Soft. Bowel sounds are normal. There is  no distension or  Tenderness appreciated. There is no rebound and no guarding.   Musculoskeletal: Normal range of motion. There is  no edema or tenderness. No sign of blood clot  Neurological: Patient is at her baseline.  Incoherent speech at times.  Tangential talking.  Does not recognize me or her daughter at bedside.  Not oriented.    Skin: Skin is warm and dry without significant rashes     Results Review:   I reviewed the patient's new imaging results and agree with the interpretation.    DATA:  Lab Results (last 24 hours)     Procedure Component Value Units Date/Time    Basic Metabolic Panel [[id number]]  (Abnormal) Collected: 11/09/20 0455    Specimen: Blood Updated: 11/09/20 0536     Glucose 79 mg/dL      BUN 34 mg/dL      Creatinine 1.20 mg/dL      Sodium 141 mmol/L      Potassium 3.5 mmol/L      Comment: Slight hemolysis detected by analyzer. Results may be affected.        Chloride 104 mmol/L      CO2 29.0 mmol/L      Calcium 11.4 mg/dL      eGFR Non African Amer 42 mL/min/1.73      BUN/Creatinine Ratio 28.3     Anion Gap 8.0 mmol/L     Narrative:      GFR Normal >60  Chronic Kidney Disease <60  Kidney Failure <15      Troponin [[id number]]  (Abnormal) Collected: 11/08/20 2042    Specimen: Blood Updated: 11/08/20 2109     Troponin T 0.032 ng/mL     Narrative:      Troponin T Reference Range:  <= 0.03 ng/mL-   Negative for AMI  >0.03 ng/mL-     Abnormal for myocardial necrosis.  Clinicians would have to utilize clinical acumen, EKG, Troponin and serial changes to determine if it is an Acute Myocardial Infarction or myocardial injury due to an underlying chronic condition.       Results may be falsely decreased if patient taking Biotin.      D-dimer, Quantitative [[id number]]  (Abnormal) Collected: 11/08/20 2016    Specimen: Blood Updated: 11/08/20 2033     D-Dimer, Quantitative 2.54  mg/L (FEU)     Narrative:      Reference Range is 0-0.50 mg/L FEU. However, results <0.50 mg/L FEU tends to rule out DVT or PE. Results >0.50 mg/L FEU are not useful in predicting absence or presence of DVT or PE.      COVID PRE-OP / PRE-PROCEDURE SCREENING ORDER (NO ISOLATION) - Swab, Nasopharynx [[id number]]  (Normal) Collected: 11/08/20 1841    Specimen: Swab from Nasopharynx Updated: 11/08/20 1954    Narrative:      The following orders were created for panel order COVID PRE-OP / PRE-PROCEDURE SCREENING ORDER (NO ISOLATION) - Swab, Nasopharynx.  Procedure                               Abnormality         Status                     ---------                               -----------         ------                     Respiratory Panel PCR w/...[[id number]]  Normal              Final result                 Please view results for these tests on the individual orders.    Respiratory Panel PCR w/COVID-19(SARS-CoV-2) BECCA/ANGELIC/ERIN/PAD/COR/MAD/DELVIS In-House, NP Swab in UTM/VTM, 3-4 HR TAT - Swab, Nasopharynx [[id number]]  (Normal) Collected: 11/08/20 1841    Specimen: Swab from Nasopharynx Updated: 11/08/20 1954     ADENOVIRUS, PCR Not Detected     Coronavirus 229E Not Detected     Coronavirus HKU1 Not Detected     Coronavirus NL63 Not Detected     Coronavirus OC43 Not Detected     COVID19 Not Detected     Human Metapneumovirus Not Detected     Human Rhinovirus/Enterovirus Not Detected     Influenza A PCR Not Detected     Influenza A H1 Not Detected     Influenza A H1 2009 PCR Not Detected     Influenza A H3 Not Detected     Influenza B PCR Not Detected     Parainfluenza Virus 1 Not Detected     Parainfluenza Virus 2 Not Detected     Parainfluenza Virus 3 Not Detected     Parainfluenza Virus 4 Not Detected     RSV, PCR Not Detected     Bordetella pertussis pcr Not Detected     Bordetella parapertussis PCR Not Detected     Chlamydophila pneumoniae PCR Not Detected     Mycoplasma pneumo by PCR Not Detected    Narrative:       Fact sheet for providers: https://docs.[employer]/wp-content/uploads/ITZ1024-7090-CX4.1-EUA-Provider-Fact-Sheet-3.pdf    Fact sheet for patients: https://docs.[employer]/wp-content/uploads/AYG7149-2774-EQ3.1-EUA-Patient-Fact-Sheet-1.pdf    Urinalysis, Microscopic Only - Urine, Clean Catch [[id number]]  (Abnormal) Collected: 11/08/20 1926    Specimen: Urine, Clean Catch Updated: 11/08/20 1953     RBC, UA 3-5 /HPF      WBC, UA None Seen /HPF      Bacteria, UA None Seen /HPF      Squamous Epithelial Cells, UA 3-6 /HPF      Hyaline Casts, UA None Seen /LPF      Methodology Manual Light Microscopy    Urinalysis With Culture If Indicated - Urine, Clean Catch [[id number]]  (Abnormal) Collected: 11/08/20 1926    Specimen: Urine, Clean Catch Updated: 11/08/20 1945     Color, UA Yellow     Appearance, UA Clear     pH, UA 5.5     Specific Gravity, UA 1.017     Glucose, UA Negative     Ketones, UA Negative     Bilirubin, UA Negative     Blood, UA Trace     Protein, UA Negative     Leuk Esterase, UA Negative     Nitrite, UA Negative     Urobilinogen, UA 1.0 E.U./dL    Comprehensive Metabolic Panel [[id number]]  (Abnormal) Collected: 11/08/20 1838    Specimen: Blood Updated: 11/08/20 1929     Glucose 88 mg/dL      BUN 38 mg/dL      Creatinine 1.38 mg/dL      Sodium 141 mmol/L      Potassium 3.8 mmol/L      Chloride 105 mmol/L      CO2 29.0 mmol/L      Calcium 11.3 mg/dL      Total Protein 5.7 g/dL      Albumin 3.30 g/dL      ALT (SGPT) 16 U/L      AST (SGOT) 33 U/L      Alkaline Phosphatase 123 U/L      Total Bilirubin 0.5 mg/dL      eGFR Non African Amer 36 mL/min/1.73      Globulin 2.4 gm/dL      A/G Ratio 1.4 g/dL      BUN/Creatinine Ratio 27.5     Anion Gap 7.0 mmol/L     Narrative:      GFR Normal >60  Chronic Kidney Disease <60  Kidney Failure <15      Troponin [[id number]]  (Abnormal) Collected: 11/08/20 1838    Specimen: Blood Updated: 11/08/20 1912     Troponin T 0.032 ng/mL     Narrative:      Troponin T Reference  Range:  <= 0.03 ng/mL-   Negative for AMI  >0.03 ng/mL-     Abnormal for myocardial necrosis.  Clinicians would have to utilize clinical acumen, EKG, Troponin and serial changes to determine if it is an Acute Myocardial Infarction or myocardial injury due to an underlying chronic condition.       Results may be falsely decreased if patient taking Biotin.      BNP [[id number]]  (Normal) Collected: 11/08/20 1838    Specimen: Blood Updated: 11/08/20 1910     proBNP 215.9 pg/mL     Narrative:      Among patients with dyspnea, NT-proBNP is highly sensitive for the detection of acute congestive heart failure. In addition NT-proBNP of <300 pg/ml effectively rules out acute congestive heart failure with 99% negative predictive value.    Results may be falsely decreased if patient taking Biotin.      Lactic Acid, Plasma [[id number]]  (Normal) Collected: 11/08/20 1838    Specimen: Blood Updated: 11/08/20 1909     Lactate 1.0 mmol/L     CBC & Differential [[id number]]  (Normal) Collected: 11/08/20 1838    Specimen: Blood Updated: 11/08/20 1852    Narrative:      The following orders were created for panel order CBC & Differential.  Procedure                               Abnormality         Status                     ---------                               -----------         ------                     CBC Auto Differential[[id number]]        Normal              Final result                 Please view results for these tests on the individual orders.    CBC Auto Differential [[id number]]  (Normal) Collected: 11/08/20 1838    Specimen: Blood Updated: 11/08/20 1852     WBC 8.69 10*3/mm3      RBC 4.61 10*6/mm3      Hemoglobin 13.9 g/dL      Hematocrit 40.5 %      MCV 87.9 fL      MCH 30.2 pg      MCHC 34.3 g/dL      RDW 14.0 %      RDW-SD 44.4 fl      MPV 10.7 fL      Platelets 218 10*3/mm3      Neutrophil % 57.6 %      Lymphocyte % 32.8 %      Monocyte % 6.9 %      Eosinophil % 1.5 %      Basophil % 0.9 %      Immature Grans % 0.3 %       Neutrophils, Absolute 5.00 10*3/mm3      Lymphocytes, Absolute 2.85 10*3/mm3      Monocytes, Absolute 0.60 10*3/mm3      Eosinophils, Absolute 0.13 10*3/mm3      Basophils, Absolute 0.08 10*3/mm3      Immature Grans, Absolute 0.03 10*3/mm3      nRBC 0.0 /100 WBC     Blood Gas, Arterial - [[id number]]  (Abnormal) Collected: 11/08/20 1840    Specimen: Arterial Blood Updated: 11/08/20 1850     Site Left Radial     Cipriano's Test Positive     pH, Arterial 7.429 pH units      pCO2, Arterial 43.3 mm Hg      pO2, Arterial 67.8 mm Hg      Comment: 84 Value below reference range        HCO3, Arterial 28.6 mmol/L      Comment: 83 Value above reference range        Base Excess, Arterial 3.7 mmol/L      Comment: 83 Value above reference range        O2 Saturation, Arterial 95.6 %      Temperature 37.0 C      Barometric Pressure for Blood Gas 754 mmHg      Modality Room Air     Ventilator Mode NA     Collected by [id number]     Comment: Meter: [id number]     :  [id number]        pCO2, Temperature Corrected 43.3 mm Hg      pH, Temp Corrected 7.429 pH Units      pO2, Temperature Corrected 67.8 mm Hg     Blood Culture - Blood, Arm, Right [[id number]] Collected: 11/08/20 1838    Specimen: Blood from Arm, Right Updated: 11/08/20 1849    Blood Culture - Blood, Arm, Left [[id number]] Collected: 11/08/20 1838    Specimen: Blood from Arm, Left Updated: 11/08/20 1849        Imaging Results (Last 24 Hours)     Procedure Component Value Units Date/Time    CT Angiogram Chest [[id number]] Collected: 11/09/20 0736     Updated: 11/09/20 0755    Narrative:      EXAMINATION: CT ANGIOGRAM CHEST-  11/9/2020 7:37 AM CST      HISTORY: Elevated d-dimer, altered mental status     COMPARISON: None.      DLP: 309 mGy cm     In order to have a CT radiation dose as low as reasonably achievable,  Automated Exposure Control was utilized for adjustment of the mA and/or  KV according to patient size.     TECHNIQUE: Helical tomographic images of the  chest were obtained after  the administration of intravenous contrast following angiogram protocol.  Additionally,  reconstructions in the coronal and sagittal planes and 3D  reconstructions were provided.        FINDINGS:    BASE OF THE NECK: The imaged portion of the base of the neck appears  unremarkable.     PULMONARY ARTERIES: There is adequate visualization of the pulmonary  arteries. There are multiple filling defects including segmental and  subsegmental branches in the RIGHT middle lobe, LEFT upper lobe,  lingula, and LEFT lower lobe pulmonary arteries. Lobar embolus in the  distal LEFT main pulmonary artery. Borderline RIGHT ventricular diameter  measuring up to 3.6 cm with LEFT ventricular diameter measuring up to  3.9 cm.      LUNGS: Patchy area of groundglass opacity in the LEFT lung apex measures  up to 1.2 cm. Some mild linear atelectasis noted in the lung bases  bilaterally.     AIRWAY: The trachea is patent. Main bronchi are also grossly patent.     CARDIAC: There are some coronary calcifications. The heart is not  enlarged. There is no pericardial effusion.      MEDIASTINUM/LYMPH NODES: No enlarged axillary or mediastinal lymph nodes  are present.      SURROUNDING STRUCTURES: There are degenerative changes in the spine. No  acute osseous abnormality is identified on this examination..      UPPER ABDOMEN: The imaged portion of the upper abdomen appears  unremarkable.           Impression:         1.  Multiple bilateral segmental and subsegmental pulmonary emboli as  well as lobar emboli on the LEFT.     2.  Very mild prominence of the RIGHT ventricle with RV-LV ratio less  remaining than 1.     3.  Small focal area of groundglass in the LEFT lung apex measuring up  to 1.2 cm. Follow-up CT chest recommended in 3 months to document  resolution.     4.  Gray calcifications noted.     5.  A preliminary report was provided by Dr. Fitzgerald of [facility] at  2227 hours.        This report was finalized on  11/09/2020 07:52 by Dr. Chance Bustamante MD.    CT Head Without Contrast [[id number]] Collected: 11/08/20 2045     Updated: 11/08/20 2049    Narrative:      EXAMINATION: CT HEAD WO CONTRAST-      11/8/2020 8:30 PM CST     HISTORY: Recent head injury. Altered mental status.     In order to have a CT radiation dose as low as reasonably achievable  Automated Exposure Control was utilized for adjustment of the mA and/or  KV according to patient size.     DLP in mGycm= 644.     Noncontrast head CT compared with 10/30/2020.     Axial, sagittal, and coronal noncontrast CT imaging of the head.     The visualized paranasal sinuses are clear.     The brain and ventricles have an age appropriate appearance.  Mild atrophy and moderate small vessel disease.  There is no hemorrhage or mass-effect.   No acute infarction is seen.     No calvarial abnormality.       Impression:      1. Stable chronic change.  2. No acute intracranial abnormality is seen.                                         This report was finalized on 11/08/2020 20:46 by Dr. Jose Kinney MD.    XR Chest 1 View [[id number]] Collected: 11/08/20 1838     Updated: 11/08/20 1841    Narrative:      EXAMINATION: XR CHEST 1 VW-     11/8/2020 6:20 PM CST     HISTORY: Altered mental status.     One view chest x-ray compared with 9/11/2020.     Heart size is normal.  The mediastinum is within normal limits.      The lungs are normally expanded with no pneumonia or pneumothorax.  Chronic interstitial lung disease.  No congestive failure changes.                                                                       Impression:      1. No acute disease.           This report was finalized on 11/08/2020 18:38 by Dr. Jose Kinney MD.            ASSESSMENT AND PLAN:      1.     Acute pulmonary embolism with acute cor pulmonale (CMS/HCC)    Alzheimer's dementia, late onset, with behavioral disturbance (CMS/HCC)    Essential hypertension    Acute on chronic systolic congestive  heart failure (CMS/HCC)    Chronic diastolic CHF (congestive heart failure) (CMS/HCC)    Pulmonary embolus-patient resting comfortably in the bed without oxygen and not in respiratory distress.  Carlton and open discussion held with daughter at bedside that at our age a 50-50 chance of survival with a pulmonary embolus and at nearly 89 years of age that number increases dramatically.  I would recommend conservative treatment and starting Xarelto at active DVT/PE dosing.  She does not require oxygen at this point in time.  She is a DNR.  Comfort care.  Apparently cardiology was consulted out of the emergency room but I do not think they will have much to offer in this situation.  Offered to scan lower extremities to look for possible DVT but it would not change our treatment plan and could exacerbate patient's restlessness/agitation.  Risk versus benefit discussed with daughter at bedside and she agrees.  Our plan is to treat with Xarelto twice daily for 3 weeks and then daily for 3 months.  Family request treatment in a conservative plan and I agree.  Patient has MOST form filled out in the office.  And daughter confirms DNR/comfort care today.    Arthur Fine MD  08:17 CST 11/9/2020

## 2020-11-09 NOTE — ED PROVIDER NOTES
Subjective   Patient is a very pleasant 87-year-old female who presents from the [facility] with complaint of increased confusion and generalized weakness.  The patient has a history of dementia.  She is unable to answer any my questions appropriately.  The patient is able to tell me her name but is unable to tell me her birthday.  She is very pleasant and will talk about other subjects but is unable to tell me what is going on today.  Per EMS report the patient was more confused earlier today.  They state that the patient had a fall approximately 1 week ago but was seen at this ER and had a normal CT scan of the head.  The patient had been doing well until today.  They stated that patient seem more confused.  She has not had any further recent falls.  They state that the patient normally is able to use her walker and walk to the dining [place] and eat and drink on her own but does require some assistance with activities such as bathing.  They state that the patient would not get out of her chair today and did not eat or drink.  Stated this is quite unusual for the patient so she was brought to the ER for further evaluation.  Again the patient is unable to tell me any of her current symptoms.  She is not in any acute distress.  She presents here today for further evaluation.  I discussed the patient with her daughter who verifies that this is the current history on the patient.      History provided by:  EMS personnel, nursing home and relative  History limited by:  Dementia   used: No    Altered Mental Status  Presenting symptoms: behavior changes and lethargy    Severity:  Mild  Most recent episode:  Today  Episode history:  Continuous  Duration:  1 day  Timing:  Constant  Progression:  Unchanged  Chronicity:  New  Context: dementia and nursing home resident    Context: not alcohol use, not drug use, not head injury, not homeless, taking medications as prescribed, not recent change in  medication, not recent illness and not recent infection    Associated symptoms: no abdominal pain, normal movement, no agitation, no bladder incontinence, no decreased appetite, no depression, no difficulty breathing, no eye deviation, no fever, no hallucinations, no headaches, no light-headedness, no nausea, no palpitations, no rash, no seizures, no slurred speech, no suicidal behavior, no visual change, no vomiting and no weakness        Review of Systems   Unable to perform ROS: Dementia   Constitutional: Negative for decreased appetite and fever.   Cardiovascular: Negative for palpitations.   Gastrointestinal: Negative for abdominal pain, nausea and vomiting.   Genitourinary: Negative for bladder incontinence.   Skin: Negative for rash.   Neurological: Negative for seizures, weakness, light-headedness and headaches.   Psychiatric/Behavioral: Negative for agitation and hallucinations.       Past Medical History:   Diagnosis Date   • Dementia (CMS/HCC)    • Hypertension        Allergies   Allergen Reactions   • Sulfa Antibiotics Unknown - High Severity       Past Surgical History:   Procedure Laterality Date   • HYSTERECTOMY     • KNEE MENISCAL REPAIR         History reviewed. No pertinent family history.    Social History     Socioeconomic History   • Marital status:      Spouse name: Not on file   • Number of children: Not on file   • Years of education: Not on file   • Highest education level: Not on file   Tobacco Use   • Smoking status: Never Smoker   • Smokeless tobacco: Never Used   Substance and Sexual Activity   • Alcohol use: Never     Frequency: Never   • Drug use: Never   • Sexual activity: Defer           Objective   Physical Exam  Vitals signs and nursing note reviewed.   Constitutional:       Appearance: She is well-developed.   HENT:      Head: Normocephalic and atraumatic.   Eyes:      Pupils: Pupils are equal, round, and reactive to light.   Neck:      Musculoskeletal: Normal range of  motion.   Cardiovascular:      Rate and Rhythm: Bradycardia present.   Pulmonary:      Effort: Pulmonary effort is normal.      Breath sounds: Normal breath sounds.   Abdominal:      General: Bowel sounds are normal.      Palpations: Abdomen is soft.   Musculoskeletal: Normal range of motion.   Skin:     General: Skin is warm and dry.      Capillary Refill: Capillary refill takes less than 2 seconds.   Neurological:      Mental Status: She is alert. She is confused.         Procedures           ED Course  ED Course as of Nov 09 0018   Mon Nov 09, 2020 0016 The patient had a slightly elevated troponin.  Repeat troponin was the same.  EKG x2 showed no acute changes associated with a STEMI.  I did note that the patient's oxygen saturation at 1 time was 93% on room air.  It is documented that her oxygen saturation got down to 83% however this is an erroneous reading.  I was advised this by the nursing staff.  The patient had elevated D-dimer and was taken to CT scan.  She has bilateral PEs.  There is also a 12 mm opacity that was noted in the chest that will need follow-up and of advised the patient's daughter of this.  Patient was given Lovenox for the PEs.  I called and discussed her case with Dr. Platt, cardiologist on-call.  At this time the patient does not need to go to [facility].  He is advised the patient should be admitted to her primary care provider and does not need to go to the intensive care unit as she has stable vital signs.  I called and spoke with Dr. Lagunas, who is on-call for the patient's primary care provider.  She is okay with the patient being admitted to the telemetry floor.  I have placed an order for BMP in the morning as well as an echocardiogram.  She states that this may be done in the a.m.  The patient will be admitted at this time in stable condition.  Advised the patient and her daughter of all findings and the patient's daughters agree with plan of care.    [LF]      ED Course  User Index  [LF] Kelin Lopez, APRN                                   CT Head Without Contrast   Final Result   1. Stable chronic change.   2. No acute intracranial abnormality is seen.                                                       This report was finalized on 11/08/2020 20:46 by Dr. Jose Kinney MD.      XR Chest 1 View   Final Result   1. No acute disease.               This report was finalized on 11/08/2020 18:38 by Dr. Jose Kinney MD.      CT Angiogram Chest    (Results Pending)     Labs Reviewed   COMPREHENSIVE METABOLIC PANEL - Abnormal; Notable for the following components:       Result Value    BUN 38 (*)     Creatinine 1.38 (*)     Calcium 11.3 (*)     Total Protein 5.7 (*)     Albumin 3.30 (*)     AST (SGOT) 33 (*)     Alkaline Phosphatase 123 (*)     eGFR Non African Amer 36 (*)     BUN/Creatinine Ratio 27.5 (*)     All other components within normal limits    Narrative:     GFR Normal >60  Chronic Kidney Disease <60  Kidney Failure <15     URINALYSIS W/ CULTURE IF INDICATED - Abnormal; Notable for the following components:    Blood, UA Trace (*)     All other components within normal limits   TROPONIN (IN-HOUSE) - Abnormal; Notable for the following components:    Troponin T 0.032 (*)     All other components within normal limits    Narrative:     Troponin T Reference Range:  <= 0.03 ng/mL-   Negative for AMI  >0.03 ng/mL-     Abnormal for myocardial necrosis.  Clinicians would have to utilize clinical acumen, EKG, Troponin and serial changes to determine if it is an Acute Myocardial Infarction or myocardial injury due to an underlying chronic condition.       Results may be falsely decreased if patient taking Biotin.     BLOOD GAS, ARTERIAL - Abnormal; Notable for the following components:    pO2, Arterial 67.8 (*)     HCO3, Arterial 28.6 (*)     Base Excess, Arterial 3.7 (*)     pO2, Temperature Corrected 67.8 (*)     All other components within normal limits   D-DIMER, QUANTITATIVE -  Abnormal; Notable for the following components:    D-Dimer, Quantitative 2.54 (*)     All other components within normal limits    Narrative:     Reference Range is 0-0.50 mg/L FEU. However, results <0.50 mg/L FEU tends to rule out DVT or PE. Results >0.50 mg/L FEU are not useful in predicting absence or presence of DVT or PE.     URINALYSIS, MICROSCOPIC ONLY - Abnormal; Notable for the following components:    RBC, UA 3-5 (*)     Squamous Epithelial Cells, UA 3-6 (*)     All other components within normal limits   TROPONIN (IN-HOUSE) - Abnormal; Notable for the following components:    Troponin T 0.032 (*)     All other components within normal limits    Narrative:     Troponin T Reference Range:  <= 0.03 ng/mL-   Negative for AMI  >0.03 ng/mL-     Abnormal for myocardial necrosis.  Clinicians would have to utilize clinical acumen, EKG, Troponin and serial changes to determine if it is an Acute Myocardial Infarction or myocardial injury due to an underlying chronic condition.       Results may be falsely decreased if patient taking Biotin.     RESPIRATORY PANEL PCR W/ COVID-19 (SARS-COV-2) BECCA/ANGELIC/ERIN/PAD/COR/MAD/DELVIS IN-HOUSE, NP SWAB IN UTM/VTP, 3-4 HR TAT - Normal    Narrative:     Fact sheet for providers: https://docs.[employer]/wp-content/uploads/QRR1068-0843-VP4.1-EUA-Provider-Fact-Sheet-3.pdf    Fact sheet for patients: https://docs.[employer]/wp-content/uploads/TWQ9942-9536-PT8.1-EUA-Patient-Fact-Sheet-1.pdf   BNP (IN-HOUSE) - Normal    Narrative:     Among patients with dyspnea, NT-proBNP is highly sensitive for the detection of acute congestive heart failure. In addition NT-proBNP of <300 pg/ml effectively rules out acute congestive heart failure with 99% negative predictive value.    Results may be falsely decreased if patient taking Biotin.     LACTIC ACID, PLASMA - Normal   CBC WITH AUTO DIFFERENTIAL - Normal   COVID PRE-OP / PRE-PROCEDURE SCREENING ORDER (NO ISOLATION)    Narrative:     The  following orders were created for panel order COVID PRE-OP / PRE-PROCEDURE SCREENING ORDER (NO ISOLATION) - Swab, Nasopharynx.  Procedure                               Abnormality         Status                     ---------                               -----------         ------                     Respiratory Panel PCR w/...[[id number]]  Normal              Final result                 Please view results for these tests on the individual orders.   BLOOD CULTURE   BLOOD CULTURE   BLOOD GAS, ARTERIAL   BASIC METABOLIC PANEL   CBC AND DIFFERENTIAL    Narrative:     The following orders were created for panel order CBC & Differential.  Procedure                               Abnormality         Status                     ---------                               -----------         ------                     CBC Auto Differential[[id number]]        Normal              Final result                 Please view results for these tests on the individual orders.             MDM  Number of Diagnoses or Management Options  Acute pulmonary embolism with acute cor pulmonale, unspecified pulmonary embolism type (CMS/HCC): new and requires workup  Altered mental status, unspecified altered mental status type: new and requires workup  Elevated troponin: new and requires workup     Amount and/or Complexity of Data Reviewed  Clinical lab tests: ordered and reviewed  Tests in the radiology section of CPT®: ordered and reviewed  Tests in the medicine section of CPT®: ordered and reviewed  Decide to obtain previous medical records or to obtain history from someone other than the patient: yes  Discuss the patient with other providers: yes    Patient Progress  Patient progress: stable      Final diagnoses:   Acute pulmonary embolism with acute cor pulmonale, unspecified pulmonary embolism type (CMS/HCC)   Elevated troponin   Altered mental status, unspecified altered mental status type            Kelin Lopez, APRN  11/09/20  0018

## 2020-11-10 NOTE — PROGRESS NOTES
Nichole Mcgregor is a 87 y.o. female patient.    Daughter at bedside.  Bradycardia noted at night.      Current Facility-Administered Medications   Medication Dose Route Frequency Provider Last Rate Last Dose   • acetaminophen (TYLENOL) tablet 650 mg  650 mg Oral Q6H PRN Arthur Fine MD   650 mg at 11/09/20 1750   • amLODIPine (NORVASC) tablet 5 mg  5 mg Oral Q24H Arthur Fine MD   5 mg at 11/09/20 0947    And   • lisinopril (PRINIVIL,ZESTRIL) tablet 20 mg  20 mg Oral Q24H Arthur Fine MD   20 mg at 11/09/20 0947   • escitalopram (LEXAPRO) tablet 20 mg  20 mg Oral Daily Arthur Fine MD   20 mg at 11/09/20 0947   • furosemide (LASIX) tablet 20 mg  20 mg Oral Daily Arthur Fine MD   20 mg at 11/09/20 0947   • memantine (NAMENDA) tablet 10 mg  10 mg Oral Q12H Arthur Fine MD   10 mg at 11/09/20 2020   • metoprolol succinate XL (TOPROL-XL) 24 hr tablet 25 mg  25 mg Oral Daily Arthur Fine MD       • Pharmacy Consult   Does not apply Continuous PRN Arthur Fine MD       • rivaroxaban (XARELTO) tablet 15 mg  15 mg Oral BID With Meals Arthur Fine MD        Followed by   • [START ON 11/30/2020] rivaroxaban (XARELTO) tablet 20 mg  20 mg Oral Daily With Dinner Arthur Fine MD       • sodium chloride 0.9 % flush 10 mL  10 mL Intravenous PRN Kelin Lopez APRN       • sodium chloride 0.9 % flush 10 mL  10 mL Intravenous Q12H Arthur Fine MD   10 mL at 11/09/20 0946   • sodium chloride 0.9 % flush 10 mL  10 mL Intravenous PRN Arthur Fine MD       • sodium chloride 0.9 % infusion  100 mL/hr Intravenous Continuous Arthur Fine  mL/hr at 11/10/20 0128 100 mL/hr at 11/10/20 0128   • traZODone (DESYREL) tablet 50 mg  50 mg Oral Nightly Arthur Fine MD   50 mg at 11/09/20 2020     ALLERGIES:    Allergies   Allergen Reactions   • Sulfa Antibiotics Unknown - High Severity       Acute pulmonary  "embolism with acute cor pulmonale (CMS/HCC)    Alzheimer's dementia, late onset, with behavioral disturbance (CMS/[facility])    Essential hypertension    Acute on chronic systolic congestive heart failure (CMS/[facility])    Chronic diastolic CHF (congestive heart failure) (CMS/[facility])    Blood pressure 164/56, pulse (!) 47, temperature 97.6 °F (36.4 °C), temperature source Oral, resp. rate 16, height 160 cm (62.99\"), weight 83 kg (182 lb 15.7 oz), SpO2 100 %, not currently breastfeeding.      Subjective:  Symptoms:  Stable.    Diet:  Adequate intake.    Activity level: Impaired due to weakness.    Pain:  She reports no pain.      Review of Systems  Objective:  General Appearance:  Comfortable and well-appearing.    Vital signs: (most recent): Blood pressure 164/56, pulse (!) 47, temperature 97.6 °F (36.4 °C), temperature source Oral, resp. rate 16, height 160 cm (62.99\"), weight 83 kg (182 lb 15.7 oz), SpO2 100 %, not currently breastfeeding.  Vital signs are normal.  (Low heart rate while sleeping).    Output: Producing urine and minimal stool output.    HEENT: Normal HEENT exam.    Lungs:  Normal effort and normal respiratory rate.    Heart: Normal rate.  Regular rhythm.    Abdomen: Abdomen is soft.  Bowel sounds are normal.   There is generalized tenderness.     Extremities: Normal range of motion.    Neurological: (Awake and oriented name only).    Skin:  Warm and dry.              Labs:  Lab Results (last 72 hours)     Procedure Component Value Units Date/Time    Comprehensive Metabolic Panel [[id number]]  (Abnormal) Collected: 11/10/20 0451    Specimen: Blood Updated: 11/10/20 0551     Glucose 99 mg/dL      BUN 27 mg/dL      Creatinine 1.13 mg/dL      Sodium 142 mmol/L      Potassium 3.7 mmol/L      Chloride 110 mmol/L      CO2 28.0 mmol/L      Calcium 10.4 mg/dL      Total Protein 4.8 g/dL      Albumin 2.80 g/dL      ALT (SGPT) 11 U/L      AST (SGOT) 23 U/L      Alkaline Phosphatase 104 U/L      Total Bilirubin 0.4 mg/dL     "  eGFR Non African Amer 46 mL/min/1.73      Globulin 2.0 gm/dL      A/G Ratio 1.4 g/dL      BUN/Creatinine Ratio 23.9     Anion Gap 4.0 mmol/L     Narrative:      GFR Normal >60  Chronic Kidney Disease <60  Kidney Failure <15      CBC Auto Differential [[id number]]  (Abnormal) Collected: 11/10/20 0451    Specimen: Blood Updated: 11/10/20 0513     WBC 6.73 10*3/mm3      RBC 3.98 10*6/mm3      Hemoglobin 11.9 g/dL      Hematocrit 35.3 %      MCV 88.7 fL      MCH 29.9 pg      MCHC 33.7 g/dL      RDW 13.9 %      RDW-SD 44.6 fl      MPV 11.0 fL      Platelets 195 10*3/mm3      Neutrophil % 48.1 %      Lymphocyte % 39.7 %      Monocyte % 7.6 %      Eosinophil % 3.3 %      Basophil % 1.0 %      Immature Grans % 0.3 %      Neutrophils, Absolute 3.24 10*3/mm3      Lymphocytes, Absolute 2.67 10*3/mm3      Monocytes, Absolute 0.51 10*3/mm3      Eosinophils, Absolute 0.22 10*3/mm3      Basophils, Absolute 0.07 10*3/mm3      Immature Grans, Absolute 0.02 10*3/mm3      nRBC 0.0 /100 WBC     Blood Culture - Blood, Arm, Left [[id number]] Collected: 11/08/20 1838    Specimen: Blood from Arm, Left Updated: 11/09/20 1900     Blood Culture No growth at 24 hours    Blood Culture - Blood, Arm, Right [[id number]] Collected: 11/08/20 1838    Specimen: Blood from Arm, Right Updated: 11/09/20 1900     Blood Culture No growth at 24 hours    Basic Metabolic Panel [[id number]]  (Abnormal) Collected: 11/09/20 0455    Specimen: Blood Updated: 11/09/20 0536     Glucose 79 mg/dL      BUN 34 mg/dL      Creatinine 1.20 mg/dL      Sodium 141 mmol/L      Potassium 3.5 mmol/L      Comment: Slight hemolysis detected by analyzer. Results may be affected.        Chloride 104 mmol/L      CO2 29.0 mmol/L      Calcium 11.4 mg/dL      eGFR Non African Amer 42 mL/min/1.73      BUN/Creatinine Ratio 28.3     Anion Gap 8.0 mmol/L     Narrative:      GFR Normal >60  Chronic Kidney Disease <60  Kidney Failure <15      Troponin [[id number]]  (Abnormal) Collected: 11/08/20  2042    Specimen: Blood Updated: 11/08/20 2109     Troponin T 0.032 ng/mL     Narrative:      Troponin T Reference Range:  <= 0.03 ng/mL-   Negative for AMI  >0.03 ng/mL-     Abnormal for myocardial necrosis.  Clinicians would have to utilize clinical acumen, EKG, Troponin and serial changes to determine if it is an Acute Myocardial Infarction or myocardial injury due to an underlying chronic condition.       Results may be falsely decreased if patient taking Biotin.      D-dimer, Quantitative [[id number]]  (Abnormal) Collected: 11/08/20 2016    Specimen: Blood Updated: 11/08/20 2033     D-Dimer, Quantitative 2.54 mg/L (FEU)     Narrative:      Reference Range is 0-0.50 mg/L FEU. However, results <0.50 mg/L FEU tends to rule out DVT or PE. Results >0.50 mg/L FEU are not useful in predicting absence or presence of DVT or PE.      COVID PRE-OP / PRE-PROCEDURE SCREENING ORDER (NO ISOLATION) - Swab, Nasopharynx [[id number]]  (Normal) Collected: 11/08/20 1841    Specimen: Swab from Nasopharynx Updated: 11/08/20 1954    Narrative:      The following orders were created for panel order COVID PRE-OP / PRE-PROCEDURE SCREENING ORDER (NO ISOLATION) - Swab, Nasopharynx.  Procedure                               Abnormality         Status                     ---------                               -----------         ------                     Respiratory Panel PCR w/...[[id number]]  Normal              Final result                 Please view results for these tests on the individual orders.    Respiratory Panel PCR w/COVID-19(SARS-CoV-2) BECCA/ANGELIC/ERIN/PAD/COR/MAD/DELVIS In-House, NP Swab in UTM/VTM, 3-4 HR TAT - Swab, Nasopharynx [[id number]]  (Normal) Collected: 11/08/20 1841    Specimen: Swab from Nasopharynx Updated: 11/08/20 1954     ADENOVIRUS, PCR Not Detected     Coronavirus 229E Not Detected     Coronavirus HKU1 Not Detected     Coronavirus NL63 Not Detected     Coronavirus OC43 Not Detected     COVID19 Not Detected     Human  Metapneumovirus Not Detected     Human Rhinovirus/Enterovirus Not Detected     Influenza A PCR Not Detected     Influenza A H1 Not Detected     Influenza A H1 2009 PCR Not Detected     Influenza A H3 Not Detected     Influenza B PCR Not Detected     Parainfluenza Virus 1 Not Detected     Parainfluenza Virus 2 Not Detected     Parainfluenza Virus 3 Not Detected     Parainfluenza Virus 4 Not Detected     RSV, PCR Not Detected     Bordetella pertussis pcr Not Detected     Bordetella parapertussis PCR Not Detected     Chlamydophila pneumoniae PCR Not Detected     Mycoplasma pneumo by PCR Not Detected    Narrative:      Fact sheet for providers: https://docs.[employer]/wp-content/uploads/NWN1348-8548-BR4.1-EUA-Provider-Fact-Sheet-3.pdf    Fact sheet for patients: https://docs.[employer]/wp-content/uploads/QSU6006-4225-ZL9.1-EUA-Patient-Fact-Sheet-1.pdf    Urinalysis, Microscopic Only - Urine, Clean Catch [[id number]]  (Abnormal) Collected: 11/08/20 1926    Specimen: Urine, Clean Catch Updated: 11/08/20 1953     RBC, UA 3-5 /HPF      WBC, UA None Seen /HPF      Bacteria, UA None Seen /HPF      Squamous Epithelial Cells, UA 3-6 /HPF      Hyaline Casts, UA None Seen /LPF      Methodology Manual Light Microscopy    Urinalysis With Culture If Indicated - Urine, Clean Catch [[id number]]  (Abnormal) Collected: 11/08/20 1926    Specimen: Urine, Clean Catch Updated: 11/08/20 1945     Color, UA Yellow     Appearance, UA Clear     pH, UA 5.5     Specific Gravity, UA 1.017     Glucose, UA Negative     Ketones, UA Negative     Bilirubin, UA Negative     Blood, UA Trace     Protein, UA Negative     Leuk Esterase, UA Negative     Nitrite, UA Negative     Urobilinogen, UA 1.0 E.U./dL    Comprehensive Metabolic Panel [[id number]]  (Abnormal) Collected: 11/08/20 1838    Specimen: Blood Updated: 11/08/20 1929     Glucose 88 mg/dL      BUN 38 mg/dL      Creatinine 1.38 mg/dL      Sodium 141 mmol/L      Potassium 3.8 mmol/L      Chloride  105 mmol/L      CO2 29.0 mmol/L      Calcium 11.3 mg/dL      Total Protein 5.7 g/dL      Albumin 3.30 g/dL      ALT (SGPT) 16 U/L      AST (SGOT) 33 U/L      Alkaline Phosphatase 123 U/L      Total Bilirubin 0.5 mg/dL      eGFR Non African Amer 36 mL/min/1.73      Globulin 2.4 gm/dL      A/G Ratio 1.4 g/dL      BUN/Creatinine Ratio 27.5     Anion Gap 7.0 mmol/L     Narrative:      GFR Normal >60  Chronic Kidney Disease <60  Kidney Failure <15      Troponin [[id number]]  (Abnormal) Collected: 11/08/20 1838    Specimen: Blood Updated: 11/08/20 1912     Troponin T 0.032 ng/mL     Narrative:      Troponin T Reference Range:  <= 0.03 ng/mL-   Negative for AMI  >0.03 ng/mL-     Abnormal for myocardial necrosis.  Clinicians would have to utilize clinical acumen, EKG, Troponin and serial changes to determine if it is an Acute Myocardial Infarction or myocardial injury due to an underlying chronic condition.       Results may be falsely decreased if patient taking Biotin.      BNP [[id number]]  (Normal) Collected: 11/08/20 1838    Specimen: Blood Updated: 11/08/20 1910     proBNP 215.9 pg/mL     Narrative:      Among patients with dyspnea, NT-proBNP is highly sensitive for the detection of acute congestive heart failure. In addition NT-proBNP of <300 pg/ml effectively rules out acute congestive heart failure with 99% negative predictive value.    Results may be falsely decreased if patient taking Biotin.      Lactic Acid, Plasma [[id number]]  (Normal) Collected: 11/08/20 1838    Specimen: Blood Updated: 11/08/20 1909     Lactate 1.0 mmol/L     CBC & Differential [[id number]]  (Normal) Collected: 11/08/20 1838    Specimen: Blood Updated: 11/08/20 1852    Narrative:      The following orders were created for panel order CBC & Differential.  Procedure                               Abnormality         Status                     ---------                               -----------         ------                     CBC Auto  Differential[[id number]]        Normal              Final result                 Please view results for these tests on the individual orders.    CBC Auto Differential [[id number]]  (Normal) Collected: 11/08/20 1838    Specimen: Blood Updated: 11/08/20 1852     WBC 8.69 10*3/mm3      RBC 4.61 10*6/mm3      Hemoglobin 13.9 g/dL      Hematocrit 40.5 %      MCV 87.9 fL      MCH 30.2 pg      MCHC 34.3 g/dL      RDW 14.0 %      RDW-SD 44.4 fl      MPV 10.7 fL      Platelets 218 10*3/mm3      Neutrophil % 57.6 %      Lymphocyte % 32.8 %      Monocyte % 6.9 %      Eosinophil % 1.5 %      Basophil % 0.9 %      Immature Grans % 0.3 %      Neutrophils, Absolute 5.00 10*3/mm3      Lymphocytes, Absolute 2.85 10*3/mm3      Monocytes, Absolute 0.60 10*3/mm3      Eosinophils, Absolute 0.13 10*3/mm3      Basophils, Absolute 0.08 10*3/mm3      Immature Grans, Absolute 0.03 10*3/mm3      nRBC 0.0 /100 WBC     Blood Gas, Arterial - [[id number]]  (Abnormal) Collected: 11/08/20 1840    Specimen: Arterial Blood Updated: 11/08/20 1850     Site Left Radial     Cipriano's Test Positive     pH, Arterial 7.429 pH units      pCO2, Arterial 43.3 mm Hg      pO2, Arterial 67.8 mm Hg      Comment: 84 Value below reference range        HCO3, Arterial 28.6 mmol/L      Comment: 83 Value above reference range        Base Excess, Arterial 3.7 mmol/L      Comment: 83 Value above reference range        O2 Saturation, Arterial 95.6 %      Temperature 37.0 C      Barometric Pressure for Blood Gas 754 mmHg      Modality Room Air     Ventilator Mode NA     Collected by [id number]     Comment: Meter: [id number]     :  [id number]        pCO2, Temperature Corrected 43.3 mm Hg      pH, Temp Corrected 7.429 pH Units      pO2, Temperature Corrected 67.8 mm Hg           Imaging Results (Last 72 Hours)     Procedure Component Value Units Date/Time    CT Angiogram Chest [[id number]] Collected: 11/09/20 0736     Updated: 11/09/20 0755    Narrative:      EXAMINATION:  CT ANGIOGRAM CHEST-  11/9/2020 7:37 AM CST      HISTORY: Elevated d-dimer, altered mental status     COMPARISON: None.      DLP: 309 mGy cm     In order to have a CT radiation dose as low as reasonably achievable,  Automated Exposure Control was utilized for adjustment of the mA and/or  KV according to patient size.     TECHNIQUE: Helical tomographic images of the chest were obtained after  the administration of intravenous contrast following angiogram protocol.  Additionally,  reconstructions in the coronal and sagittal planes and 3D  reconstructions were provided.        FINDINGS:    BASE OF THE NECK: The imaged portion of the base of the neck appears  unremarkable.     PULMONARY ARTERIES: There is adequate visualization of the pulmonary  arteries. There are multiple filling defects including segmental and  subsegmental branches in the RIGHT middle lobe, LEFT upper lobe,  lingula, and LEFT lower lobe pulmonary arteries. Lobar embolus in the  distal LEFT main pulmonary artery. Borderline RIGHT ventricular diameter  measuring up to 3.6 cm with LEFT ventricular diameter measuring up to  3.9 cm.      LUNGS: Patchy area of groundglass opacity in the LEFT lung apex measures  up to 1.2 cm. Some mild linear atelectasis noted in the lung bases  bilaterally.     AIRWAY: The trachea is patent. Main bronchi are also grossly patent.     CARDIAC: There are some coronary calcifications. The heart is not  enlarged. There is no pericardial effusion.      MEDIASTINUM/LYMPH NODES: No enlarged axillary or mediastinal lymph nodes  are present.      SURROUNDING STRUCTURES: There are degenerative changes in the spine. No  acute osseous abnormality is identified on this examination..      UPPER ABDOMEN: The imaged portion of the upper abdomen appears  unremarkable.           Impression:         1.  Multiple bilateral segmental and subsegmental pulmonary emboli as  well as lobar emboli on the LEFT.     2.  Very mild prominence of the  RIGHT ventricle with RV-LV ratio less  remaining than 1.     3.  Small focal area of groundglass in the LEFT lung apex measuring up  to 1.2 cm. Follow-up CT chest recommended in 3 months to document  resolution.     4.  Gray calcifications noted.     5.  A preliminary report was provided by Dr. Fitzgerald of stat rad at  2227 hours.        This report was finalized on 11/09/2020 07:52 by Dr. Chance Bustamante MD.    CT Head Without Contrast [[id number]] Collected: 11/08/20 2045     Updated: 11/08/20 2049    Narrative:      EXAMINATION: CT HEAD WO CONTRAST-      11/8/2020 8:30 PM CST     HISTORY: Recent head injury. Altered mental status.     In order to have a CT radiation dose as low as reasonably achievable  Automated Exposure Control was utilized for adjustment of the mA and/or  KV according to patient size.     DLP in mGycm= 644.     Noncontrast head CT compared with 10/30/2020.     Axial, sagittal, and coronal noncontrast CT imaging of the head.     The visualized paranasal sinuses are clear.     The brain and ventricles have an age appropriate appearance.  Mild atrophy and moderate small vessel disease.  There is no hemorrhage or mass-effect.   No acute infarction is seen.     No calvarial abnormality.       Impression:      1. Stable chronic change.  2. No acute intracranial abnormality is seen.                                         This report was finalized on 11/08/2020 20:46 by Dr. Jose Kinney MD.    XR Chest 1 View [[id number]] Collected: 11/08/20 1838     Updated: 11/08/20 1841    Narrative:      EXAMINATION: XR CHEST 1 VW-     11/8/2020 6:20 PM CST     HISTORY: Altered mental status.     One view chest x-ray compared with 9/11/2020.     Heart size is normal.  The mediastinum is within normal limits.      The lungs are normally expanded with no pneumonia or pneumothorax.  Chronic interstitial lung disease.  No congestive failure changes.                                                                        Impression:      1. No acute disease.           This report was finalized on 11/08/2020 18:38 by Dr. Jose Kinney MD.                Assessment:    Condition: In stable condition.  Improving.   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  Will get pharmacy to adjust dose for age/creatinine clearance.  Suspect 15 mg twice daily for 3 weeks and 20 mg daily for 3 months.    Bradycardia-decreased metoprolol to 25 mg daily.  Follow heart rate.    Plan to discharge back to the [facility] Wednesday morning.  Extended conference with family at bedside today).     Problem List:     Acute pulmonary embolism with acute cor pulmonale (CMS/HCC)    Alzheimer's dementia, late onset, with behavioral disturbance (CMS/HCC)    Essential hypertension    Acute on chronic systolic congestive heart failure (CMS/HCC)    Chronic diastolic CHF (congestive heart failure) (CMS/HCC)    Arthur Fine MD  11/10/2020

## 2020-11-10 NOTE — PROGRESS NOTES
"Pharmacy Dosing Service  Anticoagulant  rivaroxaban (Xarelto)     Assessment/Action/Plan:  Pharmacy to dose Xarelto for PE. Patient was started on Xarelto 15 mg PO twice daily on 11/9/20. Recommendation to continue for a total of 21 days then decrease to 20 mg daily with dinner. Advanced age and poor renal function noted. No dose adjustment needed for ether risk factors for treating a DVT/PE per the medication package insert. Pharmacy will continue to follow.     Subjective:  Nichole Mcgregor is a 87 y.o. female on rivaroxaban (Xarelto) for indication of PE.    Objective:  [Ht: 160 cm (62.99\"); Wt: 83 kg (182 lb 15.7 oz); BMI: Body mass index is 32.42 kg/m².]  Estimated Creatinine Clearance: 35.8 mL/min (A) (by C-G formula based on SCr of 1.13 mg/dL (H)).   Lab Results   Component Value Date    INR 1.05 10/30/2020    INR 1.03 10/23/2020    PROTIME 13.3 10/30/2020    PROTIME 13.4 10/23/2020      Lab Results   Component Value Date    HGB 11.9 (L) 11/10/2020    HGB 13.9 11/08/2020    HGB 14.5 10/30/2020      Lab Results   Component Value Date     11/10/2020     11/08/2020     10/30/2020       Juan Jacome, PharmD  11/10/20 07:06 CST     "

## 2020-11-10 NOTE — PLAN OF CARE
Goal Outcome Evaluation:  Plan of Care Reviewed With: patient  Progress: no change   Pt was able to sleep thorough night. IV fluids cont. Pt took meds well when whole with ice cream. SB 43-55 on tele. Confused but alert to person. Safety maintained will cont to monitor.

## 2020-11-11 NOTE — PLAN OF CARE
Goal Outcome Evaluation:  Plan of Care Reviewed With: patient  Progress: improving  Outcome Summary: .  Pt more alert tonight. No c/o pain. Took meds well. SR/SB 49-65 on tele. VSS. Anticipated d/c to [facility] today. Safety maintained. Will cont to monitor

## 2020-11-11 NOTE — NURSING NOTE
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 On early morning vitals she was hypertensive, I gave 2 of her BP meds early.

## 2020-11-11 NOTE — DISCHARGE PLACEMENT REQUEST
"From:  TOY Starkey  [phone]    Nichole Green (87 y.o. Female)     Date of Birth Social Security Number Address Home Phone MRN    03/31/1933  [place]  [address] [facility] [id number] [phone] [id number]    Yazidi Marital Status          Hoahaoism        Admission Date Admission Type Admitting Provider Attending Provider Department, Room/Bed    11/8/20 Emergency Arthur Fine MD Eickholz, James Noah, MD [address], 447/1    Discharge Date Discharge Disposition Discharge Destination         Home or Self Care              Attending Provider: Arthur Fine MD    Allergies: Sulfa Antibiotics    Isolation: None   Infection: COVID (rule out) (11/11/20)   Code Status: No CPR    Ht: 160 cm (62.99\")   Wt: 83 kg (182 lb 15.7 oz)    Admission Cmt: None   Principal Problem: None                Active Insurance as of 11/8/2020     Primary Coverage     Payor Plan Insurance Group Employer/Plan Group    HUMANA MEDICARE REPLACEMENT HUMANA MEDICARE REPLACEMENT [id number]     Payor Plan Address Payor Plan Phone Number Payor Plan Fax Number Effective Dates    [address] [address] [phone]  1/1/2018 - None Entered    [facility] [id number]       Subscriber Name Subscriber Birth Date Member ID       NICHOLE GREEN 3/31/1933 [id number]                 Emergency Contacts      (Rel.) Home Phone Work Phone Mobile Phone    JOSEPHINEKARUNA (Power of ) [phone] -- --    RA DOMINGUEZ (Daughter) [phone] -- --    ELIEZERARELYYARA (Daughter) [phone] -- --        Ambulatory Referral to Home Health [[id number]] (Order [id number])  Order  Date: 11/11/2020 Department: [address] Ordering/Authorizing: Arthur Fine MD   Order History  Outpatient  Date/Time Action Taken User Additional Information   11/11/20 0859 Katelin Ha RN Ordering Mode: Per protocol: no cosign required   Order " Details    Frequency Duration Priority Order Class   None None Routine Internal Referral   Start Date/Time    Start Date   11/11/20   Order Information    Order Date Service Start Date Start Time   11/11/20 Medicine 11/11/20    Reference Links    Associated Reports External References   View Encounter Current Health Referral Information   Order Questions    Question Answer Comment   Face to Face Visit Date: 11/11/2020    Follow-up provider for Plan of Care? I treated the patient in an acute care facility and will not continue treatment after discharge.    Follow-up provider: CLOVER MCCRACKEN    Reason/Clinical Findings acute PE, weakness    Describe mobility limitations that make leaving home difficult: generalized weakenss, dementia    Nursing/Therapeutic Services Requested Other    Frequency: 1 Week 1           Verbal Order Info    Action Created on Order Mode Entered by Responsible Provider Signed by Signed on   Ordering 11/11/20 0859 Per protocol: no cosign required Katelin Lacy, NASIM      Source Order Set / Preference List    Preference List   [facility] FACILITY REFERRALS [[id number]]   Clinical Indications     ICD-10-CM ICD-9-CM   Acute pulmonary embolism with acute cor pulmonale, unspecified pulmonary embolism type (CMS/[facility])  - Primary     I26.09 415.19  415.0   Altered mental status, unspecified altered mental status type     R41.82 780.97   Reprint Order Requisition    Ambulatory Referral to Home Health (Order #[id number]) on 11/11/20   Encounter    View Encounter          Order Provider Info        Office phone Pager E-mail   Ordering User Katelin Lacy, NASIM -- -- --   Authorizing Provider Clover Mccracken [phone] -- --   Attending Provider Clover Mccracken [phone] -- --   Entered By Katelin Lacy, NASIM -- -- --   Ordering Provider Clover Mccracken [phone] -- --   Linked Charges    No charges linked to this procedure   Tracking Reports  Cosign  Tracking Order Transmittal Tracking   Authorized by:  Arthur Fine MD  (NPI: [id number])       Lab Component SmartPhrase Guide    Ambulatory Referral to Home Health (Order #[id number]) on 11/11/20            History & Physical      Arthur Fine MD at 11/09/20 0817          History and Physical      CHIEF COMPLAINT: Not acting right    Reason for Admission: DVT/PE    History Obtained From: Daughter at bedside    HISTORY OF PRESENT ILLNESS:      The patient is a 87 y.o. female with significant past medical history of late onset dementia with behavioral disturbances residing in local assisted care who presents with increased shortness of breath and not acting right.  Patient seen the emergency room CTA of the chest revealed bilateral pulmonary embolus.  Patient was not in respiratory distress.  Patient admitted after dose of Lovenox given in the emergency room.  Carlton and open conversation held with daughter at bedside in an 87-year-old lady with significant dementia our treatment plan.  DNR and treated in a conservative manner.  No other precipitating or relieving factors noted.    Past Medical History:    Past Medical History:   Diagnosis Date   • Dementia (CMS/HCC)    • Hypertension        Past Surgical History:    Past Surgical History:   Procedure Laterality Date   • HYSTERECTOMY     • KNEE MENISCAL REPAIR         Medications Prior to Admission:    Medications Prior to Admission   Medication Sig Dispense Refill Last Dose   • amLODIPine-benazepril (LOTREL 5-20) 5-20 MG per capsule Take 1 capsule by mouth Daily. 30 capsule 3    • benzonatate (TESSALON PERLES) 100 MG capsule Take 1 capsule by mouth 3 (Three) Times a Day As Needed for Cough. 30 capsule 1    • busPIRone (BUSPAR) 15 MG tablet Take 15 mg by mouth 3 (Three) Times a Day.      • busPIRone (BUSPAR) 5 MG tablet Take 1 tablet by mouth 3 (Three) Times a Day. 90 tablet 3    • cefdinir (OMNICEF) 300 MG capsule Take 1 capsule by mouth 2 (Two) Times  a Day. 20 capsule 0    • ergocalciferol (ERGOCALCIFEROL) 1.25 MG (97594 UT) capsule Take 1 capsule by mouth 2 (Two) Times a Week. 8 capsule 11    • escitalopram (LEXAPRO) 20 MG tablet Take 1 tablet by mouth Daily. 30 tablet 3    • fexofenadine (ALLEGRA ALLERGY) 180 MG tablet Take 1 tablet by mouth Daily. 30 tablet 5    • furosemide (LASIX) 20 MG tablet Take 1 tablet by mouth Daily. 30 tablet 3    • memantine (NAMENDA XR) 28 MG capsule sustained-release 24 hr extended release capsule Take 1 capsule by mouth Daily. 30 capsule 3    • metoprolol succinate XL (TOPROL-XL) 50 MG 24 hr tablet Take 1 tablet by mouth Daily. 30 tablet 3    • traZODone (DESYREL) 50 MG tablet Take 1 tablet by mouth Every Night. 90 tablet 0        Allergies:  Sulfa antibiotics    Social History:   Social History     Socioeconomic History   • Marital status:      Spouse name: Not on file   • Number of children: Not on file   • Years of education: Not on file   • Highest education level: Not on file   Tobacco Use   • Smoking status: Never Smoker   • Smokeless tobacco: Never Used   Substance and Sexual Activity   • Alcohol use: Never     Frequency: Never   • Drug use: Never   • Sexual activity: Defer       Family History:   History reviewed. No pertinent family history.    REVIEW OF SYSTEMS:    Unable to obtain an accurate review of systems due to significant dementia.  Daughter at bedside states she has been in her usual state of health at the assisted living.  She carries on conversation occasionally with family but most the time is incoherent speech.  She has not been in any significant respiratory distress.  She recently was hospitalized at [facility] for a urinary tract infection.  Those records are reviewed.        Vital Signs   Temp:  [97.4 °F (36.3 °C)-98.4 °F (36.9 °C)] 97.4 °F (36.3 °C)  Heart Rate:  [47-92] 92  Resp:  [16-18] 18  BP: (113-153)/(44-82) 153/57          Physical Exam:  Constitutional: The patient is not oriented to  person, place, or time.  She is talking and incoherent speech ,they appear well-developed.   HEENT:   Head: Normocephalic and atraumatic.   Eyes: Pupils are equal, round, and reactive to light.   Neck: Neck supple without masses or carotid bruit.  Cardiovascular: Regular rhythm and normal heart sounds.    Pulmonary/Chest: Effort normal and breath sounds normal. CTAB  Abdominal: Soft. Bowel sounds are normal. There is  no distension or  Tenderness appreciated. There is no rebound and no guarding.   Musculoskeletal: Normal range of motion. There is  no edema or tenderness. No sign of blood clot  Neurological: Patient is at her baseline.  Incoherent speech at times.  Tangential talking.  Does not recognize me or her daughter at bedside.  Not oriented.    Skin: Skin is warm and dry without significant rashes     Results Review:   I reviewed the patient's new imaging results and agree with the interpretation.    DATA:  Lab Results (last 24 hours)     Procedure Component Value Units Date/Time    Basic Metabolic Panel [[id number]]  (Abnormal) Collected: 11/09/20 0455    Specimen: Blood Updated: 11/09/20 0536     Glucose 79 mg/dL      BUN 34 mg/dL      Creatinine 1.20 mg/dL      Sodium 141 mmol/L      Potassium 3.5 mmol/L      Comment: Slight hemolysis detected by analyzer. Results may be affected.        Chloride 104 mmol/L      CO2 29.0 mmol/L      Calcium 11.4 mg/dL      eGFR Non African Amer 42 mL/min/1.73      BUN/Creatinine Ratio 28.3     Anion Gap 8.0 mmol/L     Narrative:      GFR Normal >60  Chronic Kidney Disease <60  Kidney Failure <15      Troponin [[id number]]  (Abnormal) Collected: 11/08/20 2042    Specimen: Blood Updated: 11/08/20 2109     Troponin T 0.032 ng/mL     Narrative:      Troponin T Reference Range:  <= 0.03 ng/mL-   Negative for AMI  >0.03 ng/mL-     Abnormal for myocardial necrosis.  Clinicians would have to utilize clinical acumen, EKG, Troponin and serial changes to determine if it is an Acute  Myocardial Infarction or myocardial injury due to an underlying chronic condition.       Results may be falsely decreased if patient taking Biotin.      D-dimer, Quantitative [[id number]]  (Abnormal) Collected: 11/08/20 2016    Specimen: Blood Updated: 11/08/20 2033     D-Dimer, Quantitative 2.54 mg/L (FEU)     Narrative:      Reference Range is 0-0.50 mg/L FEU. However, results <0.50 mg/L FEU tends to rule out DVT or PE. Results >0.50 mg/L FEU are not useful in predicting absence or presence of DVT or PE.      COVID PRE-OP / PRE-PROCEDURE SCREENING ORDER (NO ISOLATION) - Swab, Nasopharynx [[id number]]  (Normal) Collected: 11/08/20 1841    Specimen: Swab from Nasopharynx Updated: 11/08/20 1954    Narrative:      The following orders were created for panel order COVID PRE-OP / PRE-PROCEDURE SCREENING ORDER (NO ISOLATION) - Swab, Nasopharynx.  Procedure                               Abnormality         Status                     ---------                               -----------         ------                     Respiratory Panel PCR w/...[[id number]]  Normal              Final result                 Please view results for these tests on the individual orders.    Respiratory Panel PCR w/COVID-19(SARS-CoV-2) BECCA/ANGELIC/ERIN/PAD/COR/MAD/DELVIS In-House, NP Swab in UTM/[facility], 3-4 HR TAT - Swab, Nasopharynx [[id number]]  (Normal) Collected: 11/08/20 1841    Specimen: Swab from Nasopharynx Updated: 11/08/20 1954     ADENOVIRUS, PCR Not Detected     Coronavirus 229E Not Detected     Coronavirus HKU1 Not Detected     Coronavirus NL63 Not Detected     Coronavirus OC43 Not Detected     COVID19 Not Detected     Human Metapneumovirus Not Detected     Human Rhinovirus/Enterovirus Not Detected     Influenza A PCR Not Detected     Influenza A H1 Not Detected     Influenza A H1 2009 PCR Not Detected     Influenza A H3 Not Detected     Influenza B PCR Not Detected     Parainfluenza Virus 1 Not Detected     Parainfluenza Virus 2 Not Detected      Parainfluenza Virus 3 Not Detected     Parainfluenza Virus 4 Not Detected     RSV, PCR Not Detected     Bordetella pertussis pcr Not Detected     Bordetella parapertussis PCR Not Detected     Chlamydophila pneumoniae PCR Not Detected     Mycoplasma pneumo by PCR Not Detected    Narrative:      Fact sheet for providers: https://docs.[employer]/wp-content/uploads/VCX9256-2816-YS0.1-EUA-Provider-Fact-Sheet-3.pdf    Fact sheet for patients: https://docs.[employer]/wp-content/uploads/RCN6250-2184-GB9.1-EUA-Patient-Fact-Sheet-1.pdf    Urinalysis, Microscopic Only - Urine, Clean Catch [[id number]]  (Abnormal) Collected: 11/08/20 1926    Specimen: Urine, Clean Catch Updated: 11/08/20 1953     RBC, UA 3-5 /HPF      WBC, UA None Seen /HPF      Bacteria, UA None Seen /HPF      Squamous Epithelial Cells, UA 3-6 /HPF      Hyaline Casts, UA None Seen /LPF      Methodology Manual Light Microscopy    Urinalysis With Culture If Indicated - Urine, Clean Catch [[id number]]  (Abnormal) Collected: 11/08/20 1926    Specimen: Urine, Clean Catch Updated: 11/08/20 1945     Color, UA Yellow     Appearance, UA Clear     pH, UA 5.5     Specific Gravity, UA 1.017     Glucose, UA Negative     Ketones, UA Negative     Bilirubin, UA Negative     Blood, UA Trace     Protein, UA Negative     Leuk Esterase, UA Negative     Nitrite, UA Negative     Urobilinogen, UA 1.0 E.U./dL    Comprehensive Metabolic Panel [[id number]]  (Abnormal) Collected: 11/08/20 1838    Specimen: Blood Updated: 11/08/20 1929     Glucose 88 mg/dL      BUN 38 mg/dL      Creatinine 1.38 mg/dL      Sodium 141 mmol/L      Potassium 3.8 mmol/L      Chloride 105 mmol/L      CO2 29.0 mmol/L      Calcium 11.3 mg/dL      Total Protein 5.7 g/dL      Albumin 3.30 g/dL      ALT (SGPT) 16 U/L      AST (SGOT) 33 U/L      Alkaline Phosphatase 123 U/L      Total Bilirubin 0.5 mg/dL      eGFR Non African Amer 36 mL/min/1.73      Globulin 2.4 gm/dL      A/G Ratio 1.4 g/dL      BUN/Creatinine  Ratio 27.5     Anion Gap 7.0 mmol/L     Narrative:      GFR Normal >60  Chronic Kidney Disease <60  Kidney Failure <15      Troponin [[id number]]  (Abnormal) Collected: 11/08/20 1838    Specimen: Blood Updated: 11/08/20 1912     Troponin T 0.032 ng/mL     Narrative:      Troponin T Reference Range:  <= 0.03 ng/mL-   Negative for AMI  >0.03 ng/mL-     Abnormal for myocardial necrosis.  Clinicians would have to utilize clinical acumen, EKG, Troponin and serial changes to determine if it is an Acute Myocardial Infarction or myocardial injury due to an underlying chronic condition.       Results may be falsely decreased if patient taking Biotin.      BNP [[id number]]  (Normal) Collected: 11/08/20 1838    Specimen: Blood Updated: 11/08/20 1910     proBNP 215.9 pg/mL     Narrative:      Among patients with dyspnea, NT-proBNP is highly sensitive for the detection of acute congestive heart failure. In addition NT-proBNP of <300 pg/ml effectively rules out acute congestive heart failure with 99% negative predictive value.    Results may be falsely decreased if patient taking Biotin.      Lactic Acid, Plasma [[id number]]  (Normal) Collected: 11/08/20 1838    Specimen: Blood Updated: 11/08/20 1909     Lactate 1.0 mmol/L     CBC & Differential [[id number]]  (Normal) Collected: 11/08/20 1838    Specimen: Blood Updated: 11/08/20 1852    Narrative:      The following orders were created for panel order CBC & Differential.  Procedure                               Abnormality         Status                     ---------                               -----------         ------                     CBC Auto Differential[[id number]]        Normal              Final result                 Please view results for these tests on the individual orders.    CBC Auto Differential [[id number]]  (Normal) Collected: 11/08/20 1838    Specimen: Blood Updated: 11/08/20 1852     WBC 8.69 10*3/mm3      RBC 4.61 10*6/mm3      Hemoglobin 13.9 g/dL       Hematocrit 40.5 %      MCV 87.9 fL      MCH 30.2 pg      MCHC 34.3 g/dL      RDW 14.0 %      RDW-SD 44.4 fl      MPV 10.7 fL      Platelets 218 10*3/mm3      Neutrophil % 57.6 %      Lymphocyte % 32.8 %      Monocyte % 6.9 %      Eosinophil % 1.5 %      Basophil % 0.9 %      Immature Grans % 0.3 %      Neutrophils, Absolute 5.00 10*3/mm3      Lymphocytes, Absolute 2.85 10*3/mm3      Monocytes, Absolute 0.60 10*3/mm3      Eosinophils, Absolute 0.13 10*3/mm3      Basophils, Absolute 0.08 10*3/mm3      Immature Grans, Absolute 0.03 10*3/mm3      nRBC 0.0 /100 WBC     Blood Gas, Arterial - [[id number]]  (Abnormal) Collected: 11/08/20 1840    Specimen: Arterial Blood Updated: 11/08/20 1850     Site Left Radial     Cipriano's Test Positive     pH, Arterial 7.429 pH units      pCO2, Arterial 43.3 mm Hg      pO2, Arterial 67.8 mm Hg      Comment: 84 Value below reference range        HCO3, Arterial 28.6 mmol/L      Comment: 83 Value above reference range        Base Excess, Arterial 3.7 mmol/L      Comment: 83 Value above reference range        O2 Saturation, Arterial 95.6 %      Temperature 37.0 C      Barometric Pressure for Blood Gas 754 mmHg      Modality Room Air     Ventilator Mode NA     Collected by [id number]     Comment: Meter: [id number]     :  [id number]        pCO2, Temperature Corrected 43.3 mm Hg      pH, Temp Corrected 7.429 pH Units      pO2, Temperature Corrected 67.8 mm Hg     Blood Culture - Blood, Arm, Right [[id number]] Collected: 11/08/20 1838    Specimen: Blood from Arm, Right Updated: 11/08/20 1849    Blood Culture - Blood, Arm, Left [[id number]] Collected: 11/08/20 1838    Specimen: Blood from Arm, Left Updated: 11/08/20 1849        Imaging Results (Last 24 Hours)     Procedure Component Value Units Date/Time    CT Angiogram Chest [[id number]] Collected: 11/09/20 0736     Updated: 11/09/20 0755    Narrative:      EXAMINATION: CT ANGIOGRAM CHEST-  11/9/2020 7:37 AM CST      HISTORY: Elevated  d-dimer, altered mental status     COMPARISON: None.      DLP: 309 mGy cm     In order to have a CT radiation dose as low as reasonably achievable,  Automated Exposure Control was utilized for adjustment of the mA and/or  KV according to patient size.     TECHNIQUE: Helical tomographic images of the chest were obtained after  the administration of intravenous contrast following angiogram protocol.  Additionally,  reconstructions in the coronal and sagittal planes and 3D  reconstructions were provided.        FINDINGS:    BASE OF THE NECK: The imaged portion of the base of the neck appears  unremarkable.     PULMONARY ARTERIES: There is adequate visualization of the pulmonary  arteries. There are multiple filling defects including segmental and  subsegmental branches in the RIGHT middle lobe, LEFT upper lobe,  lingula, and LEFT lower lobe pulmonary arteries. Lobar embolus in the  distal LEFT main pulmonary artery. Borderline RIGHT ventricular diameter  measuring up to 3.6 cm with LEFT ventricular diameter measuring up to  3.9 cm.      LUNGS: Patchy area of groundglass opacity in the LEFT lung apex measures  up to 1.2 cm. Some mild linear atelectasis noted in the lung bases  bilaterally.     AIRWAY: The trachea is patent. Main bronchi are also grossly patent.     CARDIAC: There are some coronary calcifications. The heart is not  enlarged. There is no pericardial effusion.      MEDIASTINUM/LYMPH NODES: No enlarged axillary or mediastinal lymph nodes  are present.      SURROUNDING STRUCTURES: There are degenerative changes in the spine. No  acute osseous abnormality is identified on this examination..      UPPER ABDOMEN: The imaged portion of the upper abdomen appears  unremarkable.           Impression:         1.  Multiple bilateral segmental and subsegmental pulmonary emboli as  well as lobar emboli on the LEFT.     2.  Very mild prominence of the RIGHT ventricle with RV-LV ratio less  remaining than 1.     3.  Small  focal area of groundglass in the LEFT lung apex measuring up  to 1.2 cm. Follow-up CT chest recommended in 3 months to document  resolution.     4.  Gray calcifications noted.     5.  A preliminary report was provided by Dr. Fitzgerald of stat rad at  2227 hours.        This report was finalized on 11/09/2020 07:52 by Dr. Chance Bustamante MD.    CT Head Without Contrast [[id number]] Collected: 11/08/20 2045     Updated: 11/08/20 2049    Narrative:      EXAMINATION: CT HEAD WO CONTRAST-      11/8/2020 8:30 PM CST     HISTORY: Recent head injury. Altered mental status.     In order to have a CT radiation dose as low as reasonably achievable  Automated Exposure Control was utilized for adjustment of the mA and/or  KV according to patient size.     DLP in mGycm= 644.     Noncontrast head CT compared with 10/30/2020.     Axial, sagittal, and coronal noncontrast CT imaging of the head.     The visualized paranasal sinuses are clear.     The brain and ventricles have an age appropriate appearance.  Mild atrophy and moderate small vessel disease.  There is no hemorrhage or mass-effect.   No acute infarction is seen.     No calvarial abnormality.       Impression:      1. Stable chronic change.  2. No acute intracranial abnormality is seen.                                         This report was finalized on 11/08/2020 20:46 by Dr. Jose Kinney MD.    XR Chest 1 View [[id number]] Collected: 11/08/20 1838     Updated: 11/08/20 1841    Narrative:      EXAMINATION: XR CHEST 1 VW-     11/8/2020 6:20 PM CST     HISTORY: Altered mental status.     One view chest x-ray compared with 9/11/2020.     Heart size is normal.  The mediastinum is within normal limits.      The lungs are normally expanded with no pneumonia or pneumothorax.  Chronic interstitial lung disease.  No congestive failure changes.                                                                       Impression:      1. No acute disease.           This report was  finalized on 2020 18:38 by Dr. Jose Kinney MD.            ASSESSMENT AND PLAN:      1.     Acute pulmonary embolism with acute cor pulmonale (CMS/HCC)    Alzheimer's dementia, late onset, with behavioral disturbance (CMS/HCC)    Essential hypertension    Acute on chronic systolic congestive heart failure (CMS/HCC)    Chronic diastolic CHF (congestive heart failure) (CMS/HCC)    Pulmonary embolus-patient resting comfortably in the bed without oxygen and not in respiratory distress.  Carlton and open discussion held with daughter at bedside that at our age a 50-50 chance of survival with a pulmonary embolus and at nearly 89 years of age that number increases dramatically.  I would recommend conservative treatment and starting Xarelto at active DVT/PE dosing.  She does not require oxygen at this point in time.  She is a DNR.  Comfort care.  Apparently cardiology was consulted out of the emergency room but I do not think they will have much to offer in this situation.  Offered to scan lower extremities to look for possible DVT but it would not change our treatment plan and could exacerbate patient's restlessness/agitation.  Risk versus benefit discussed with daughter at bedside and she agrees.  Our plan is to treat with Xarelto twice daily for 3 weeks and then daily for 3 months.  Family request treatment in a conservative plan and I agree.  Patient has MOST form filled out in the office.  And daughter confirms DNR/comfort care today.    Arthur Fine MD  08:17 CST 2020      Electronically signed by Arthur Fine MD at 20 0822            Discharge Summary      Arthur Fine MD at 20 0742          Hospital Discharge Summary    Nichole Mcgregor  :  3/31/1933  MRN:  [id number]    Admit date:  2020  Discharge date:  2020    Admitting Physician:    Arthur Fine MD    Discharge Diagnoses:      Acute pulmonary embolism with acute cor pulmonale (CMS/HCC)     "Alzheimer's dementia, late onset, with behavioral disturbance (CMS/HCC)    Essential hypertension    Acute on chronic systolic congestive heart failure (CMS/HCC)    Chronic diastolic CHF (congestive heart failure) (CMS/[facility])      Hospital Course:   The patient was admitted for the above surgical/medical indication.  Please see admission H&P for further details concerning the admission.  The patient was seen daily and progress noted via daily updates in the progress notes.  The patient improved throughout her stay.  They reached maximum medical improvement and were considered stable for discharge home.  They understand the importance of follow-up concerning any abnormal lab values/x-rays.  All questions were answered to the best of my ability prior to their discharge home.    Pleasant 87-year-old white female with significant dementia that presents to the emergency room \"not acting herself\" elevated D-dimer precipitated a CTA of the chest which showed bilateral pulmonary embolus.  Patient has been treated in a conservative manner due to her advanced age and her dementia.  Patient was given a dose of Lovenox and started on Xarelto.  She remained stable throughout her hospitalization and did not require oxygen supplementation.  Patient was given 3 weeks of Xarelto 15 mg twice daily followed by 3 months of Xarelto 20 mg.  Conferences were held with the daughters at bedside concerning conservative treatment plan and they agree.  On the morning of November 11 she reached maximum patient benefit will be discharged back to the [facility].        Discharge Medications:         Discharge Medications      New Medications      Instructions Start Date   rivaroxaban 15 MG tablet  Commonly known as: XARELTO   15 mg, Oral, 2 Times Daily With Meals      rivaroxaban 20 MG tablet  Commonly known as: XARELTO   20 mg, Oral, Daily With Dinner   Start Date: November 30, 2020        Changes to Medications      Instructions " Start Date   ergocalciferol 1.25 MG (29453 UT) capsule  Commonly known as: ERGOCALCIFEROL  What changed: additional instructions   50,000 Units, Oral, 2 Times Weekly      metoprolol succinate XL 25 MG 24 hr tablet  Commonly known as: TOPROL-XL  What changed:   · medication strength  · how much to take   25 mg, Oral, Daily         Continue These Medications      Instructions Start Date   allopurinol 100 MG tablet  Commonly known as: ZYLOPRIM   100 mg, Oral, Daily      aspirin 81 MG EC tablet   81 mg, Oral, Daily      benazepril 40 MG tablet  Commonly known as: LOTENSIN   40 mg, Oral, Daily      diphenhydrAMINE-acetaminophen  MG tablet per tablet  Commonly known as: TYLENOL PM   2 tablets, Oral, Nightly PRN      escitalopram 20 MG tablet  Commonly known as: LEXAPRO   20 mg, Oral, Daily      furosemide 20 MG tablet  Commonly known as: LASIX   20 mg, Oral, Daily      hydroCHLOROthiazide 12.5 MG tablet  Commonly known as: HYDRODIURIL   12.5 mg, Oral, Daily      memantine 28 MG capsule sustained-release 24 hr extended release capsule  Commonly known as: NAMENDA XR   28 mg, Oral, Daily      traZODone 50 MG tablet  Commonly known as: DESYREL   50 mg, Oral, Nightly             Consults: Nonenormal EKG, normal sinus rhythm, unchanged from previous tracings    Significant Diagnostic Studies:      EKG: normal EKG, normal sinus rhythm, unchanged from previous tracings.      Treatments:   Anticoagulation    Disposition:   [facility]  Follow up with Arthur Fine MD next week for hospital follow-up can do via telemedicine, please schedule with LEO Krishnan.    Signed:  Arthur Fine MD   11/11/2020, 07:42 CST    Electronically signed by Arthur Fine MD at 11/11/20 0744       Discharge Order (From admission, onward)     Start     Ordered    11/11/20 0739  Discharge patient  Once     Comments: Patient returning to the [facility]   Expected Discharge Date: 11/11/20       Expected Discharge Time: Morning    Discharge Disposition: Home or Self Care    Physician of Record for Attribution - Please select from Treatment Team: CLOVER MCCRACKEN [[id number]]    Review needed by CMO to determine Physician of Record: No       Question Answer Comment   Physician of Record for Attribution - Please select from Treatment Team CLOVER MCCRACKEN    Review needed by CMO to determine Physician of Record No        11/11/20 0742

## 2020-11-11 NOTE — DISCHARGE SUMMARY
"Hospital Discharge Summary    Nichole Mcgregor  :  3/31/1933  MRN:  [id number]    Admit date:  2020  Discharge date:  2020    Admitting Physician:    Arthur Fine MD    Discharge Diagnoses:      Acute pulmonary embolism with acute cor pulmonale (CMS/HCC)    Alzheimer's dementia, late onset, with behavioral disturbance (CMS/HCC)    Essential hypertension    Acute on chronic systolic congestive heart failure (CMS/HCC)    Chronic diastolic CHF (congestive heart failure) (CMS/HCC)      Hospital Course:   The patient was admitted for the above surgical/medical indication.  Please see admission H&P for further details concerning the admission.  The patient was seen daily and progress noted via daily updates in the progress notes.  The patient improved throughout her stay.  They reached maximum medical improvement and were considered stable for discharge home.  They understand the importance of follow-up concerning any abnormal lab values/x-rays.  All questions were answered to the best of my ability prior to their discharge home.    Pleasant 87-year-old white female with significant dementia that presents to the emergency room \"not acting herself\" elevated D-dimer precipitated a CTA of the chest which showed bilateral pulmonary embolus.  Patient has been treated in a conservative manner due to her advanced age and her dementia.  Patient was given a dose of Lovenox and started on Xarelto.  She remained stable throughout her hospitalization and did not require oxygen supplementation.  Patient was given 3 weeks of Xarelto 15 mg twice daily followed by 3 months of Xarelto 20 mg.  Conferences were held with the daughters at bedside concerning conservative treatment plan and they agree.  On the morning of  she reached maximum patient benefit will be discharged back to the [facility] care [facility].        Discharge Medications:         Discharge Medications      New Medications      " Instructions Start Date   rivaroxaban 15 MG tablet  Commonly known as: XARELTO   15 mg, Oral, 2 Times Daily With Meals      rivaroxaban 20 MG tablet  Commonly known as: XARELTO   20 mg, Oral, Daily With Dinner   Start Date: November 30, 2020        Changes to Medications      Instructions Start Date   ergocalciferol 1.25 MG (16767 UT) capsule  Commonly known as: ERGOCALCIFEROL  What changed: additional instructions   50,000 Units, Oral, 2 Times Weekly      metoprolol succinate XL 25 MG 24 hr tablet  Commonly known as: TOPROL-XL  What changed:   · medication strength  · how much to take   25 mg, Oral, Daily         Continue These Medications      Instructions Start Date   allopurinol 100 MG tablet  Commonly known as: ZYLOPRIM   100 mg, Oral, Daily      aspirin 81 MG EC tablet   81 mg, Oral, Daily      benazepril 40 MG tablet  Commonly known as: LOTENSIN   40 mg, Oral, Daily      diphenhydrAMINE-acetaminophen  MG tablet per tablet  Commonly known as: TYLENOL PM   2 tablets, Oral, Nightly PRN      escitalopram 20 MG tablet  Commonly known as: LEXAPRO   20 mg, Oral, Daily      furosemide 20 MG tablet  Commonly known as: LASIX   20 mg, Oral, Daily      hydroCHLOROthiazide 12.5 MG tablet  Commonly known as: HYDRODIURIL   12.5 mg, Oral, Daily      memantine 28 MG capsule sustained-release 24 hr extended release capsule  Commonly known as: NAMENDA XR   28 mg, Oral, Daily      traZODone 50 MG tablet  Commonly known as: DESYREL   50 mg, Oral, Nightly             Consults: Nonenormal EKG, normal sinus rhythm, unchanged from previous tracings    Significant Diagnostic Studies:      EKG: normal EKG, normal sinus rhythm, unchanged from previous tracings.      Treatments:   Anticoagulation    Disposition:   [facility]  Follow up with Arthur Fine MD next week for hospital follow-up can do via telemedicine, please schedule with LEO Krishnan.    Signed:  Arthur Fine MD   11/11/2020, 07:42  CST

## 2020-11-11 NOTE — PROGRESS NOTES
Continued Stay Note  [facility]     Patient Name: Nichole Mcgregor  MRN: [id number]  Today's Date: 11/11/2020    Admit Date: 11/8/2020    Discharge Plan     Row Name 11/11/20 0909       Plan    Plan  [place] with [facility]    Patient/Family in Agreement with Plan  yes    Final Discharge Disposition Code  06 - home with home health care    Final Note  Pt's family thought pt was current with [facility] however, pt is actually current with [facility].  CHRIS spoke to Kelin at [facility] and faxed orders/dc summary.    Row Name 11/11/20 0847       Plan    Plan  [place] [facility] with [facility]    Patient/Family in Agreement with Plan  yes    Final Discharge Disposition Code  06 - home with home health care    Final Note  Pt is dc back to [facility] today.  Pt's daughter stated she was current with [facility] however, they haven't had her as a patient for months.  CHRIS requested home health orders from NASIM Kumar at [facility] aware of referral.        Discharge Codes    No documentation.       Expected Discharge Date and Time     Expected Discharge Date Expected Discharge Time    Nov 11, 2020             TOY Ruiz

## 2020-11-11 NOTE — PROGRESS NOTES
Continued Stay Note  [facility]     Patient Name: Nichole Mcgregor  MRN: [id number]  Today's Date: 11/11/2020    Admit Date: 11/8/2020    Discharge Plan     Row Name 11/11/20 0847       Plan    Plan  [place] [facility] with [facility]    Patient/Family in Agreement with Plan  yes    Final Discharge Disposition Code  06 - home with home health care    Final Note  Pt is dc back to [facility] today.  Pt's daughter stated she was current with [facility] however, they haven't had her as a patient for months.  SW requested home health orders from NASIM Kumar at [facility] aware of referral.        Discharge Codes    No documentation.       Expected Discharge Date and Time     Expected Discharge Date Expected Discharge Time    Nov 11, 2020             TOY Ruiz

## 2020-11-11 NOTE — PAYOR COMM NOTE
"11/10 CLINICAL UPDATE  [id number]   [id number]    Nichole Green (87 y.o. Female)     Date of Birth Social Security Number Address Home Phone MRN    03/31/1933  [place]  [address] [facility] [address] [phone] [id number]    Buddhist Marital Status          Anabaptism        Admission Date Admission Type Admitting Provider Attending Provider Department, Room/Bed    11/8/20 Emergency Arthur Fine MD Eickholz, James Noah, MD [facility] 4B, 447/1    Discharge Date Discharge Disposition Discharge Destination                       Attending Provider: Arthur Fine MD    Allergies: Sulfa Antibiotics    Isolation: None   Infection: None   Code Status: No CPR    Ht: 160 cm (62.99\")   Wt: 83 kg (182 lb 15.7 oz)    Admission Cmt: None   Principal Problem: None                Active Insurance as of 11/8/2020     Primary Coverage     Payor Plan Insurance Group Employer/Plan Group    HUMANA MEDICARE REPLACEMENT HUMANA MEDICARE REPLACEMENT [id number]     Payor Plan Address Payor Plan Phone Number Payor Plan Fax Number Effective Dates    [address] [address] [phone]  1/1/2018 - None Entered    [facility] [phone]       Subscriber Name Subscriber Birth Date Member ID       NICHOLE GREEN 3/31/1933 [id number]                 Emergency Contacts      (Rel.) Home Phone Work Phone Mobile Phone    KARUNA BURTON (Power of ) [phone] -- --    RA DOMINGUEZ (Daughter) [phone] -- --    YARA MARTINS (Daughter) [phone] -- --               Physician Progress Notes (last 48 hours) (Notes from 11/09/20 0614 through 11/11/20 0614)      Arthur Fine MD at 11/10/20 0737          Nichole Green is a 87 y.o. female patient.    Daughter at bedside.  Bradycardia noted at night.      Current Facility-Administered Medications   Medication Dose Route Frequency Provider Last Rate Last Dose   • acetaminophen (TYLENOL) " tablet 650 mg  650 mg Oral Q6H PRN Arthur Fine MD   650 mg at 11/09/20 1750   • amLODIPine (NORVASC) tablet 5 mg  5 mg Oral Q24H Arthur Fine MD   5 mg at 11/09/20 0947    And   • lisinopril (PRINIVIL,ZESTRIL) tablet 20 mg  20 mg Oral Q24H Arthur Fine MD   20 mg at 11/09/20 0947   • escitalopram (LEXAPRO) tablet 20 mg  20 mg Oral Daily Arthur Fine MD   20 mg at 11/09/20 0947   • furosemide (LASIX) tablet 20 mg  20 mg Oral Daily Arthur Fine MD   20 mg at 11/09/20 0947   • memantine (NAMENDA) tablet 10 mg  10 mg Oral Q12H Arthur Fine MD   10 mg at 11/09/20 2020   • metoprolol succinate XL (TOPROL-XL) 24 hr tablet 25 mg  25 mg Oral Daily Arthur Fine MD       • Pharmacy Consult   Does not apply Continuous PRN Arthur Fine MD       • rivaroxaban (XARELTO) tablet 15 mg  15 mg Oral BID With Meals Arthur Fine MD        Followed by   • [START ON 11/30/2020] rivaroxaban (XARELTO) tablet 20 mg  20 mg Oral Daily With Dinner Arthur Fine MD       • sodium chloride 0.9 % flush 10 mL  10 mL Intravenous PRN Kelin Lopez APRN       • sodium chloride 0.9 % flush 10 mL  10 mL Intravenous Q12H Arthur Fine MD   10 mL at 11/09/20 0946   • sodium chloride 0.9 % flush 10 mL  10 mL Intravenous PRN Arthur Fine MD       • sodium chloride 0.9 % infusion  100 mL/hr Intravenous Continuous Arthur Fine  mL/hr at 11/10/20 0128 100 mL/hr at 11/10/20 0128   • traZODone (DESYREL) tablet 50 mg  50 mg Oral Nightly Arthur Fine MD   50 mg at 11/09/20 2020     ALLERGIES:    Allergies   Allergen Reactions   • Sulfa Antibiotics Unknown - High Severity       Acute pulmonary embolism with acute cor pulmonale (CMS/HCC)    Alzheimer's dementia, late onset, with behavioral disturbance (CMS/HCC)    Essential hypertension    Acute on chronic systolic congestive heart failure (CMS/HCC)    Chronic diastolic CHF  "(congestive heart failure) (CMS/[facility])    Blood pressure 164/56, pulse (!) 47, temperature 97.6 °F (36.4 °C), temperature source Oral, resp. rate 16, height 160 cm (62.99\"), weight 83 kg (182 lb 15.7 oz), SpO2 100 %, not currently breastfeeding.      Subjective:  Symptoms:  Stable.    Diet:  Adequate intake.    Activity level: Impaired due to weakness.    Pain:  She reports no pain.      Review of Systems  Objective:  General Appearance:  Comfortable and well-appearing.    Vital signs: (most recent): Blood pressure 164/56, pulse (!) 47, temperature 97.6 °F (36.4 °C), temperature source Oral, resp. rate 16, height 160 cm (62.99\"), weight 83 kg (182 lb 15.7 oz), SpO2 100 %, not currently breastfeeding.  Vital signs are normal.  (Low heart rate while sleeping).    Output: Producing urine and minimal stool output.    HEENT: Normal HEENT exam.    Lungs:  Normal effort and normal respiratory rate.    Heart: Normal rate.  Regular rhythm.    Abdomen: Abdomen is soft.  Bowel sounds are normal.   There is generalized tenderness.     Extremities: Normal range of motion.    Neurological: (Awake and oriented name only).    Skin:  Warm and dry.              Labs:  Lab Results (last 72 hours)     Procedure Component Value Units Date/Time    Comprehensive Metabolic Panel [[id number]]  (Abnormal) Collected: 11/10/20 0451    Specimen: Blood Updated: 11/10/20 0551     Glucose 99 mg/dL      BUN 27 mg/dL      Creatinine 1.13 mg/dL      Sodium 142 mmol/L      Potassium 3.7 mmol/L      Chloride 110 mmol/L      CO2 28.0 mmol/L      Calcium 10.4 mg/dL      Total Protein 4.8 g/dL      Albumin 2.80 g/dL      ALT (SGPT) 11 U/L      AST (SGOT) 23 U/L      Alkaline Phosphatase 104 U/L      Total Bilirubin 0.4 mg/dL      eGFR Non African Amer 46 mL/min/1.73      Globulin 2.0 gm/dL      A/G Ratio 1.4 g/dL      BUN/Creatinine Ratio 23.9     Anion Gap 4.0 mmol/L     Narrative:      GFR Normal >60  Chronic Kidney Disease <60  Kidney Failure <15      CBC " Auto Differential [[id number]]  (Abnormal) Collected: 11/10/20 0451    Specimen: Blood Updated: 11/10/20 0513     WBC 6.73 10*3/mm3      RBC 3.98 10*6/mm3      Hemoglobin 11.9 g/dL      Hematocrit 35.3 %      MCV 88.7 fL      MCH 29.9 pg      MCHC 33.7 g/dL      RDW 13.9 %      RDW-SD 44.6 fl      MPV 11.0 fL      Platelets 195 10*3/mm3      Neutrophil % 48.1 %      Lymphocyte % 39.7 %      Monocyte % 7.6 %      Eosinophil % 3.3 %      Basophil % 1.0 %      Immature Grans % 0.3 %      Neutrophils, Absolute 3.24 10*3/mm3      Lymphocytes, Absolute 2.67 10*3/mm3      Monocytes, Absolute 0.51 10*3/mm3      Eosinophils, Absolute 0.22 10*3/mm3      Basophils, Absolute 0.07 10*3/mm3      Immature Grans, Absolute 0.02 10*3/mm3      nRBC 0.0 /100 WBC     Blood Culture - Blood, Arm, Left [[id number]] Collected: 11/08/20 1838    Specimen: Blood from Arm, Left Updated: 11/09/20 1900     Blood Culture No growth at 24 hours    Blood Culture - Blood, Arm, Right [[id number]] Collected: 11/08/20 1838    Specimen: Blood from Arm, Right Updated: 11/09/20 1900     Blood Culture No growth at 24 hours    Basic Metabolic Panel [[id number]]  (Abnormal) Collected: 11/09/20 0455    Specimen: Blood Updated: 11/09/20 0536     Glucose 79 mg/dL      BUN 34 mg/dL      Creatinine 1.20 mg/dL      Sodium 141 mmol/L      Potassium 3.5 mmol/L      Comment: Slight hemolysis detected by analyzer. Results may be affected.        Chloride 104 mmol/L      CO2 29.0 mmol/L      Calcium 11.4 mg/dL      eGFR Non African Amer 42 mL/min/1.73      BUN/Creatinine Ratio 28.3     Anion Gap 8.0 mmol/L     Narrative:      GFR Normal >60  Chronic Kidney Disease <60  Kidney Failure <15      Troponin [[id number]]  (Abnormal) Collected: 11/08/20 2042    Specimen: Blood Updated: 11/08/20 2109     Troponin T 0.032 ng/mL     Narrative:      Troponin T Reference Range:  <= 0.03 ng/mL-   Negative for AMI  >0.03 ng/mL-     Abnormal for myocardial necrosis.  Clinicians would have to  utilize clinical acumen, EKG, Troponin and serial changes to determine if it is an Acute Myocardial Infarction or myocardial injury due to an underlying chronic condition.       Results may be falsely decreased if patient taking Biotin.      D-dimer, Quantitative [[id number]]  (Abnormal) Collected: 11/08/20 2016    Specimen: Blood Updated: 11/08/20 2033     D-Dimer, Quantitative 2.54 mg/L (FEU)     Narrative:      Reference Range is 0-0.50 mg/L FEU. However, results <0.50 mg/L FEU tends to rule out DVT or PE. Results >0.50 mg/L FEU are not useful in predicting absence or presence of DVT or PE.      COVID PRE-OP / PRE-PROCEDURE SCREENING ORDER (NO ISOLATION) - Swab, Nasopharynx [[id number]]  (Normal) Collected: 11/08/20 1841    Specimen: Swab from Nasopharynx Updated: 11/08/20 1954    Narrative:      The following orders were created for panel order COVID PRE-OP / PRE-PROCEDURE SCREENING ORDER (NO ISOLATION) - Swab, Nasopharynx.  Procedure                               Abnormality         Status                     ---------                               -----------         ------                     Respiratory Panel PCR w/...[[id number]]  Normal              Final result                 Please view results for these tests on the individual orders.    Respiratory Panel PCR w/COVID-19(SARS-CoV-2) BECCA/ANGELIC/ERIN/PAD/COR/MAD/DELVIS In-House, NP Swab in UTM/VTM, 3-4 HR TAT - Swab, Nasopharynx [[id number]]  (Normal) Collected: 11/08/20 1841    Specimen: Swab from Nasopharynx Updated: 11/08/20 1954     ADENOVIRUS, PCR Not Detected     Coronavirus 229E Not Detected     Coronavirus HKU1 Not Detected     Coronavirus NL63 Not Detected     Coronavirus OC43 Not Detected     COVID19 Not Detected     Human Metapneumovirus Not Detected     Human Rhinovirus/Enterovirus Not Detected     Influenza A PCR Not Detected     Influenza A H1 Not Detected     Influenza A H1 2009 PCR Not Detected     Influenza A H3 Not Detected     Influenza B PCR Not  Detected     Parainfluenza Virus 1 Not Detected     Parainfluenza Virus 2 Not Detected     Parainfluenza Virus 3 Not Detected     Parainfluenza Virus 4 Not Detected     RSV, PCR Not Detected     Bordetella pertussis pcr Not Detected     Bordetella parapertussis PCR Not Detected     Chlamydophila pneumoniae PCR Not Detected     Mycoplasma pneumo by PCR Not Detected    Narrative:      Fact sheet for providers: https://docs.[employer]/wp-content/uploads/HZC4242-2200-JG2.1-EUA-Provider-Fact-Sheet-3.pdf    Fact sheet for patients: https://docs.[employer]/wp-content/uploads/QYX9511-2614-RW8.1-EUA-Patient-Fact-Sheet-1.pdf    Urinalysis, Microscopic Only - Urine, Clean Catch [[id number]]  (Abnormal) Collected: 11/08/20 1926    Specimen: Urine, Clean Catch Updated: 11/08/20 1953     RBC, UA 3-5 /HPF      WBC, UA None Seen /HPF      Bacteria, UA None Seen /HPF      Squamous Epithelial Cells, UA 3-6 /HPF      Hyaline Casts, UA None Seen /LPF      Methodology Manual Light Microscopy    Urinalysis With Culture If Indicated - Urine, Clean Catch [[id number]]  (Abnormal) Collected: 11/08/20 1926    Specimen: Urine, Clean Catch Updated: 11/08/20 1945     Color, UA Yellow     Appearance, UA Clear     pH, UA 5.5     Specific Gravity, UA 1.017     Glucose, UA Negative     Ketones, UA Negative     Bilirubin, UA Negative     Blood, UA Trace     Protein, UA Negative     Leuk Esterase, UA Negative     Nitrite, UA Negative     Urobilinogen, UA 1.0 E.U./dL    Comprehensive Metabolic Panel [[id number]]  (Abnormal) Collected: 11/08/20 1838    Specimen: Blood Updated: 11/08/20 1929     Glucose 88 mg/dL      BUN 38 mg/dL      Creatinine 1.38 mg/dL      Sodium 141 mmol/L      Potassium 3.8 mmol/L      Chloride 105 mmol/L      CO2 29.0 mmol/L      Calcium 11.3 mg/dL      Total Protein 5.7 g/dL      Albumin 3.30 g/dL      ALT (SGPT) 16 U/L      AST (SGOT) 33 U/L      Alkaline Phosphatase 123 U/L      Total Bilirubin 0.5 mg/dL      eGFR Non   Amer 36 mL/min/1.73      Globulin 2.4 gm/dL      A/G Ratio 1.4 g/dL      BUN/Creatinine Ratio 27.5     Anion Gap 7.0 mmol/L     Narrative:      GFR Normal >60  Chronic Kidney Disease <60  Kidney Failure <15      Troponin [[id number]]  (Abnormal) Collected: 11/08/20 1838    Specimen: Blood Updated: 11/08/20 1912     Troponin T 0.032 ng/mL     Narrative:      Troponin T Reference Range:  <= 0.03 ng/mL-   Negative for AMI  >0.03 ng/mL-     Abnormal for myocardial necrosis.  Clinicians would have to utilize clinical acumen, EKG, Troponin and serial changes to determine if it is an Acute Myocardial Infarction or myocardial injury due to an underlying chronic condition.       Results may be falsely decreased if patient taking Biotin.      BNP [[id number]]  (Normal) Collected: 11/08/20 1838    Specimen: Blood Updated: 11/08/20 1910     proBNP 215.9 pg/mL     Narrative:      Among patients with dyspnea, NT-proBNP is highly sensitive for the detection of acute congestive heart failure. In addition NT-proBNP of <300 pg/ml effectively rules out acute congestive heart failure with 99% negative predictive value.    Results may be falsely decreased if patient taking Biotin.      Lactic Acid, Plasma [[id number]]  (Normal) Collected: 11/08/20 1838    Specimen: Blood Updated: 11/08/20 1909     Lactate 1.0 mmol/L     CBC & Differential [[id number]]  (Normal) Collected: 11/08/20 1838    Specimen: Blood Updated: 11/08/20 1852    Narrative:      The following orders were created for panel order CBC & Differential.  Procedure                               Abnormality         Status                     ---------                               -----------         ------                     CBC Auto Differential[[id number]]        Normal              Final result                 Please view results for these tests on the individual orders.    CBC Auto Differential [[id number]]  (Normal) Collected: 11/08/20 1838    Specimen: Blood Updated:  11/08/20 1852     WBC 8.69 10*3/mm3      RBC 4.61 10*6/mm3      Hemoglobin 13.9 g/dL      Hematocrit 40.5 %      MCV 87.9 fL      MCH 30.2 pg      MCHC 34.3 g/dL      RDW 14.0 %      RDW-SD 44.4 fl      MPV 10.7 fL      Platelets 218 10*3/mm3      Neutrophil % 57.6 %      Lymphocyte % 32.8 %      Monocyte % 6.9 %      Eosinophil % 1.5 %      Basophil % 0.9 %      Immature Grans % 0.3 %      Neutrophils, Absolute 5.00 10*3/mm3      Lymphocytes, Absolute 2.85 10*3/mm3      Monocytes, Absolute 0.60 10*3/mm3      Eosinophils, Absolute 0.13 10*3/mm3      Basophils, Absolute 0.08 10*3/mm3      Immature Grans, Absolute 0.03 10*3/mm3      nRBC 0.0 /100 WBC     Blood Gas, Arterial - [[id number]]  (Abnormal) Collected: 11/08/20 1840    Specimen: Arterial Blood Updated: 11/08/20 1850     Site Left Radial     Cipriano's Test Positive     pH, Arterial 7.429 pH units      pCO2, Arterial 43.3 mm Hg      pO2, Arterial 67.8 mm Hg      Comment: 84 Value below reference range        HCO3, Arterial 28.6 mmol/L      Comment: 83 Value above reference range        Base Excess, Arterial 3.7 mmol/L      Comment: 83 Value above reference range        O2 Saturation, Arterial 95.6 %      Temperature 37.0 C      Barometric Pressure for Blood Gas 754 mmHg      Modality Room Air     Ventilator Mode NA     Collected by [id number]     Comment: Meter: [id number]     :  [id number]        pCO2, Temperature Corrected 43.3 mm Hg      pH, Temp Corrected 7.429 pH Units      pO2, Temperature Corrected 67.8 mm Hg           Imaging Results (Last 72 Hours)     Procedure Component Value Units Date/Time    CT Angiogram Chest [[id number]] Collected: 11/09/20 0736     Updated: 11/09/20 0755    Narrative:      EXAMINATION: CT ANGIOGRAM CHEST-  11/9/2020 7:37 AM CST      HISTORY: Elevated d-dimer, altered mental status     COMPARISON: None.      DLP: 309 mGy cm     In order to have a CT radiation dose as low as reasonably achievable,  Automated Exposure Control  was utilized for adjustment of the mA and/or  KV according to patient size.     TECHNIQUE: Helical tomographic images of the chest were obtained after  the administration of intravenous contrast following angiogram protocol.  Additionally,  reconstructions in the coronal and sagittal planes and 3D  reconstructions were provided.        FINDINGS:    BASE OF THE NECK: The imaged portion of the base of the neck appears  unremarkable.     PULMONARY ARTERIES: There is adequate visualization of the pulmonary  arteries. There are multiple filling defects including segmental and  subsegmental branches in the RIGHT middle lobe, LEFT upper lobe,  lingula, and LEFT lower lobe pulmonary arteries. Lobar embolus in the  distal LEFT main pulmonary artery. Borderline RIGHT ventricular diameter  measuring up to 3.6 cm with LEFT ventricular diameter measuring up to  3.9 cm.      LUNGS: Patchy area of groundglass opacity in the LEFT lung apex measures  up to 1.2 cm. Some mild linear atelectasis noted in the lung bases  bilaterally.     AIRWAY: The trachea is patent. Main bronchi are also grossly patent.     CARDIAC: There are some coronary calcifications. The heart is not  enlarged. There is no pericardial effusion.      MEDIASTINUM/LYMPH NODES: No enlarged axillary or mediastinal lymph nodes  are present.      SURROUNDING STRUCTURES: There are degenerative changes in the spine. No  acute osseous abnormality is identified on this examination..      UPPER ABDOMEN: The imaged portion of the upper abdomen appears  unremarkable.           Impression:         1.  Multiple bilateral segmental and subsegmental pulmonary emboli as  well as lobar emboli on the LEFT.     2.  Very mild prominence of the RIGHT ventricle with RV-LV ratio less  remaining than 1.     3.  Small focal area of groundglass in the LEFT lung apex measuring up  to 1.2 cm. Follow-up CT chest recommended in 3 months to document  resolution.     4.  Gray calcifications  noted.     5.  A preliminary report was provided by Dr. Fitzgerald of stat rad at  2227 hours.        This report was finalized on 11/09/2020 07:52 by Dr. Chance Bustamante MD.    CT Head Without Contrast [[id number]] Collected: 11/08/20 2045     Updated: 11/08/20 2049    Narrative:      EXAMINATION: CT HEAD WO CONTRAST-      11/8/2020 8:30 PM CST     HISTORY: Recent head injury. Altered mental status.     In order to have a CT radiation dose as low as reasonably achievable  Automated Exposure Control was utilized for adjustment of the mA and/or  KV according to patient size.     DLP in mGycm= 644.     Noncontrast head CT compared with 10/30/2020.     Axial, sagittal, and coronal noncontrast CT imaging of the head.     The visualized paranasal sinuses are clear.     The brain and ventricles have an age appropriate appearance.  Mild atrophy and moderate small vessel disease.  There is no hemorrhage or mass-effect.   No acute infarction is seen.     No calvarial abnormality.       Impression:      1. Stable chronic change.  2. No acute intracranial abnormality is seen.                                         This report was finalized on 11/08/2020 20:46 by Dr. Jsoe Kinney MD.    XR Chest 1 View [[id number]] Collected: 11/08/20 1838     Updated: 11/08/20 1841    Narrative:      EXAMINATION: XR CHEST 1 VW-     11/8/2020 6:20 PM CST     HISTORY: Altered mental status.     One view chest x-ray compared with 9/11/2020.     Heart size is normal.  The mediastinum is within normal limits.      The lungs are normally expanded with no pneumonia or pneumothorax.  Chronic interstitial lung disease.  No congestive failure changes.                                                                       Impression:      1. No acute disease.           This report was finalized on 11/08/2020 18:38 by Dr. Jose Kinney MD.                Assessment:    Condition: In stable condition.  Improving.   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  Will get  pharmacy to adjust dose for age/creatinine clearance.  Suspect 15 mg twice daily for 3 weeks and 20 mg daily for 3 months.    Bradycardia-decreased metoprolol to 25 mg daily.  Follow heart rate.    Plan to discharge back to the [facility] Wednesday morning.  Extended conference with family at bedside today).     Problem List:     Acute pulmonary embolism with acute cor pulmonale (CMS/HCC)    Alzheimer's dementia, late onset, with behavioral disturbance (CMS/[facility])    Essential hypertension    Acute on chronic systolic congestive heart failure (CMS/HCC)    Chronic diastolic CHF (congestive heart failure) (CMS/[facility])    Arthur Fine MD  11/10/2020                                                  Electronically signed by Arthur Fine MD at 11/10/20 0740       Consult Notes (last 48 hours) (Notes from 11/09/20 0614 through 11/11/20 0614)    No notes of this type exist for this encounter.

## 2020-11-12 NOTE — PAYOR COMM NOTE
"SD HOME 20  [id number]   [phone]    Nichole Green (87 y.o. Female)     Date of Birth Social Security Number Address Home Phone MRN    1933  [place]  [address] [facility] [address] [phone] [id number]    Yarsanism Marital Status          Cheondoism        Admission Date Admission Type Admitting Provider Attending Provider Department, Room/Bed    20 Emergency Arthur Fine MD  [facility] 4B, 447/1    Discharge Date Discharge Disposition Discharge Destination        2020 Home or Self Care              Attending Provider: (none)   Allergies: Sulfa Antibiotics    Isolation: None   Infection: COVID (rule out) (20)   Code Status: Prior    Ht: 160 cm (62.99\")   Wt: 83 kg (182 lb 15.7 oz)    Admission Cmt: None   Principal Problem: None                Active Insurance as of 2020     Primary Coverage     Payor Plan Insurance Group Employer/Plan Group    HUMANA MEDICARE REPLACEMENT HUMANA MEDICARE REPLACEMENT [id number]     Payor Plan Address Payor Plan Phone Number Payor Plan Fax Number Effective Dates    [address] [address] [phone]  2018 - None Entered    [facility] [id number]       Subscriber Name Subscriber Birth Date Member ID       NICHOLE GREEN 3/31/1933 [id number]                 Emergency Contacts      (Rel.) Home Phone Work Phone Mobile Phone    KARUNA BURTON (Power of ) [phone] -- --    RA DOMINGUEZ (Daughter) [phone] -- --    YARA MARTINS (Daughter) [phone] -- --               Discharge Summary      Arthur Fine MD at 20 0742          Hospital Discharge Summary    Nichole Green  :  3/31/1933  MRN:  [id number]    Admit date:  2020  Discharge date:  2020    Admitting Physician:    Arthur Fine MD    Discharge Diagnoses:      Acute pulmonary embolism with acute cor pulmonale (CMS/HCC)    Alzheimer's dementia, late " "onset, with behavioral disturbance (CMS/HCC)    Essential hypertension    Acute on chronic systolic congestive heart failure (CMS/HCC)    Chronic diastolic CHF (congestive heart failure) (CMS/[facility])      Hospital Course:   The patient was admitted for the above surgical/medical indication.  Please see admission H&P for further details concerning the admission.  The patient was seen daily and progress noted via daily updates in the progress notes.  The patient improved throughout her stay.  They reached maximum medical improvement and were considered stable for discharge home.  They understand the importance of follow-up concerning any abnormal lab values/x-rays.  All questions were answered to the best of my ability prior to their discharge home.    Pleasant 87-year-old white female with significant dementia that presents to the emergency room \"not acting herself\" elevated D-dimer precipitated a CTA of the chest which showed bilateral pulmonary embolus.  Patient has been treated in a conservative manner due to her advanced age and her dementia.  Patient was given a dose of Lovenox and started on Xarelto.  She remained stable throughout her hospitalization and did not require oxygen supplementation.  Patient was given 3 weeks of Xarelto 15 mg twice daily followed by 3 months of Xarelto 20 mg.  Conferences were held with the daughters at bedside concerning conservative treatment plan and they agree.  On the morning of November 11 she reached maximum patient benefit will be discharged back to the [facility] care [facility].        Discharge Medications:         Discharge Medications      New Medications      Instructions Start Date   rivaroxaban 15 MG tablet  Commonly known as: XARELTO   15 mg, Oral, 2 Times Daily With Meals      rivaroxaban 20 MG tablet  Commonly known as: XARELTO   20 mg, Oral, Daily With Dinner   Start Date: November 30, 2020        Changes to Medications      Instructions Start Date   ergocalciferol " 1.25 MG (28415 UT) capsule  Commonly known as: ERGOCALCIFEROL  What changed: additional instructions   50,000 Units, Oral, 2 Times Weekly      metoprolol succinate XL 25 MG 24 hr tablet  Commonly known as: TOPROL-XL  What changed:   · medication strength  · how much to take   25 mg, Oral, Daily         Continue These Medications      Instructions Start Date   allopurinol 100 MG tablet  Commonly known as: ZYLOPRIM   100 mg, Oral, Daily      aspirin 81 MG EC tablet   81 mg, Oral, Daily      benazepril 40 MG tablet  Commonly known as: LOTENSIN   40 mg, Oral, Daily      diphenhydrAMINE-acetaminophen  MG tablet per tablet  Commonly known as: TYLENOL PM   2 tablets, Oral, Nightly PRN      escitalopram 20 MG tablet  Commonly known as: LEXAPRO   20 mg, Oral, Daily      furosemide 20 MG tablet  Commonly known as: LASIX   20 mg, Oral, Daily      hydroCHLOROthiazide 12.5 MG tablet  Commonly known as: HYDRODIURIL   12.5 mg, Oral, Daily      memantine 28 MG capsule sustained-release 24 hr extended release capsule  Commonly known as: NAMENDA XR   28 mg, Oral, Daily      traZODone 50 MG tablet  Commonly known as: DESYREL   50 mg, Oral, Nightly             Consults: Nonenormal EKG, normal sinus rhythm, unchanged from previous tracings    Significant Diagnostic Studies:      EKG: normal EKG, normal sinus rhythm, unchanged from previous tracings.      Treatments:   Anticoagulation    Disposition:   [facility]  Follow up with Arthur Fine MD next week for hospital follow-up can do via telemedicine, please schedule with LEO Krishnan.    Signed:  Arthur Fine MD   11/11/2020, 07:42 CST    Electronically signed by Arthur Fine MD at 11/11/20 0744

## 2020-11-13 NOTE — OUTREACH NOTE
Prep Survey      Responses   [facility] facility patient discharged from?  [place]   Is LACE score < 7 ?  No   Eligibility  [facility]   Date of Admission  11/08/20   Date of Discharge  11/11/20   Discharge Disposition  Home or Self Care   Discharge diagnosis  Acute pulmonary embolism with acute cor pulmonale Alzheimer's dementia CHF   Does the patient have one of the following disease processes/diagnoses(primary or secondary)?  CHF   Does the patient have Home health ordered?  Yes   What is the Home health agency?    [facility]    Is there a DME ordered?  No   Prep survey completed?  Yes          Carla Mcbride RN

## 2020-11-16 NOTE — OUTREACH NOTE
CHF Week 1 Survey      Responses   [facility] patient discharged from?  [place]   Does the patient have one of the following disease processes/diagnoses(primary or secondary)?  CHF   CHF Week 1 attempt successful?  Yes   Call start time  1234   Call end time  1237   Discharge diagnosis  Acute pulmonary embolism with acute cor pulmonale Alzheimer's dementia CHF   Is patient permission given to speak with other caregiver?  Yes   List who call center can speak with  Hector Olsen reviewed with patient/caregiver?  Yes   Is the patient having any side effects they believe may be caused by any medication additions or changes?  No   Does the patient have all medications ordered at discharge?  Yes   Is the patient taking all medications as directed (includes completed medication regime)?  Yes   Does the patient have a primary care provider?   Yes   Does the patient have an appointment with their PCP within 7 days of discharge?  Yes   Has the patient kept scheduled appointments due by today?  N/A   Has home health visited the patient within 72 hours of discharge?  Yes   Pulse Ox monitoring  None   Psychosocial issues?  No   Did the patient receive a copy of their discharge instructions?  Yes   Nursing interventions  Reviewed instructions with patient   What is the patient's perception of their health status since discharge?  Improving   Nursing interventions  Nurse provided patient education   Is the patient weighing daily?  No   Does the patient have scales?  Yes   Daily weight interventions  Education provided on importance of daily weight   Is the patient able to teach back Heart Failure diet management?  Yes   Is the patient able to teach back Heart Failure Zones?  Yes   Is the patient able to teach back signs and symptoms of worsening condition? (i.e. weight gain, shortness of air, etc.)  Yes   If the patient is a current smoker, are they able to teach back resources for cessation?  Not a smoker   Is the  patient/caregiver able to teach back the hierarchy of who to call/visit for symptoms/problems? PCP, Specialist, Home health nurse, Urgent Care, ED, 911  Yes    CHF Week 1 call completed?  Yes   Wrap up additional comments  Is back at assisted living. She did fall this morning. Does not seem to be injured per home health. Patient is in quarantine.          Niki Leon RN

## 2020-11-23 PROBLEM — N39.0 UTI (URINARY TRACT INFECTION): Status: RESOLVED | Noted: 2020-10-23 | Resolved: 2020-11-23

## 2020-11-24 NOTE — OUTREACH NOTE
CHF Week 2 Survey      Responses   [facility] patient discharged from?  [place]   Does the patient have one of the following disease processes/diagnoses(primary or secondary)?  CHF   Week 2 attempt successful?  No   Unsuccessful attempts  Attempt 1          Dania Pressley RN

## 2020-12-01 PROBLEM — R41.82 ALTERED MENTAL STATUS: Status: ACTIVE | Noted: 2020-01-01

## 2020-12-02 PROBLEM — N30.01 ACUTE CYSTITIS WITH HEMATURIA: Status: ACTIVE | Noted: 2020-01-01

## 2020-12-02 NOTE — ED PROVIDER NOTES
Subjective   Patient is brought to emergency room by ambulance with a report from EMS that she called him and told him she was just having general illness for the past 3 days.  She was not more specific than that.  When I asked her what is happened she tells me she thinks she just got upset and stayed home because of that but would not give any further explanation.  She does not tell me of any pains or fever or chills or cough or congestion.  There is no one else here to give any information.  She answers mostly in very brief statements and then closes her eyes goes back to sleep.      History provided by:  Patient and EMS personnel  History limited by: markedly poor historian.   used: No    Weakness - Generalized  Severity:  Unable to specify  Onset quality:  Gradual  Duration:  3 days  Timing:  Constant  Progression:  Unchanged  Chronicity:  New  Context: not alcohol use, not decreased sleep, not dehydration, not increased activity, not recent infection and not urinary tract infection    Relieved by:  Nothing  Worsened by:  Nothing  Ineffective treatments:  None tried  Associated symptoms: no abdominal pain, no arthralgias, no ataxia, no chest pain, no dizziness, no falls, no fever, no foul-smelling urine, no frequency, no lethargy, no seizures, no sensory-motor deficit and no urgency    Risk factors: no anemia, no congestive heart failure, no coronary artery disease, no excessive menstruation, no family hx of stroke and no new medications        Review of Systems   Unable to perform ROS: Age   Constitutional: Negative for fever.   HENT: Negative.    Respiratory: Negative.    Cardiovascular: Negative.  Negative for chest pain.   Gastrointestinal: Negative.  Negative for abdominal pain.   Genitourinary: Negative.  Negative for frequency and urgency.   Musculoskeletal: Negative.  Negative for arthralgias and falls.   Skin: Negative.    Neurological: Positive for weakness. Negative for dizziness  and seizures.   Hematological: Negative.    Psychiatric/Behavioral: Negative.    All other systems reviewed and are negative.      Past Medical History:   Diagnosis Date   • Dementia (CMS/HCC)    • Hypertension        Allergies   Allergen Reactions   • Sulfa Antibiotics Unknown - High Severity       Past Surgical History:   Procedure Laterality Date   • HYSTERECTOMY     • KNEE MENISCAL REPAIR         History reviewed. No pertinent family history.    Social History     Socioeconomic History   • Marital status:      Spouse name: Not on file   • Number of children: Not on file   • Years of education: Not on file   • Highest education level: Not on file   Tobacco Use   • Smoking status: Never Smoker   • Smokeless tobacco: Never Used   Substance and Sexual Activity   • Alcohol use: Never     Frequency: Never   • Drug use: Never   • Sexual activity: Defer       Prior to Admission medications    Medication Sig Start Date End Date Taking? Authorizing Provider   allopurinol (ZYLOPRIM) 100 MG tablet Take 100 mg by mouth Daily.    Delilah Meade MD   aspirin 81 MG EC tablet Take 81 mg by mouth Daily.    Delilah Meade MD   benazepril (LOTENSIN) 40 MG tablet Take 40 mg by mouth Daily.    Delilah Meade MD   diphenhydrAMINE-acetaminophen (TYLENOL PM)  MG tablet per tablet Take 2 tablets by mouth At Night As Needed for Sleep.    Delilah Meade MD   ergocalciferol (ERGOCALCIFEROL) 1.25 MG (85711 UT) capsule Take 1 capsule by mouth 2 (Two) Times a Week.  Patient taking differently: Take 50,000 Units by mouth 2 (Two) Times a Week. Monday and thursday 1/9/20 1/8/21  Dmitriy Vargas DO   escitalopram (LEXAPRO) 20 MG tablet Take 1 tablet by mouth Daily. 1/9/20   Dmitriy Vargas DO   furosemide (LASIX) 20 MG tablet Take 1 tablet by mouth Daily. 11/11/20   Arthur Fine MD   hydroCHLOROthiazide (HYDRODIURIL) 12.5 MG tablet Take 12.5 mg by mouth Daily.    Delilah Meade MD    memantine (NAMENDA XR) 28 MG capsule sustained-release 24 hr extended release capsule Take 1 capsule by mouth Daily. 1/13/20   Dmitriy Vargas DO   metoprolol succinate XL (TOPROL-XL) 25 MG 24 hr tablet Take 1 tablet by mouth Daily. 11/11/20   Arthur Fine MD   rivaroxaban (XARELTO) 15 MG tablet Take 1 tablet by mouth 2 (Two) Times a Day With Meals for 39 doses. Indications: DVT/PE (active thrombosis) 11/11/20 12/1/20  Arthur Fine MD   rivaroxaban (XARELTO) 20 MG tablet Take 1 tablet by mouth Daily With Dinner. Indications: DVT/PE (active thrombosis) 11/30/20   Arthur Fine MD   traZODone (DESYREL) 50 MG tablet Take 1 tablet by mouth Every Night. 11/11/20   Arthur Fine MD       Medications   sodium chloride 0.9 % flush 10 mL (has no administration in time range)   sodium chloride 0.9 % infusion (75 mL/hr Intravenous Currently Infusing 12/2/20 0627)   sodium chloride 0.9 % flush 10 mL (has no administration in time range)   sodium chloride 0.9 % flush 10 mL (has no administration in time range)   cefTRIAXone (ROCEPHIN) 1 g/100 mL 0.9% NS (MBP) (has no administration in time range)   escitalopram (LEXAPRO) tablet 20 mg (has no administration in time range)   furosemide (LASIX) tablet 20 mg (has no administration in time range)   memantine (NAMENDA) tablet 10 mg (has no administration in time range)   metoprolol succinate XL (TOPROL-XL) 24 hr tablet 25 mg (has no administration in time range)   rivaroxaban (XARELTO) tablet 20 mg (has no administration in time range)   cefTRIAXone (ROCEPHIN) 1 g/100 mL 0.9% NS (MBP) (0 g Intravenous Stopped 12/1/20 0663)       Vitals:    12/02/20 0806   BP: 128/55   Pulse: 51   Resp: 15   Temp: 97.5 °F (36.4 °C)   SpO2: 94%         Objective   Physical Exam  Vitals signs and nursing note reviewed.   Constitutional:       Appearance: Normal appearance.   HENT:      Head: Normocephalic and atraumatic.   Eyes:      Extraocular Movements:  Extraocular movements intact.      Pupils: Pupils are equal, round, and reactive to light.   Neck:      Musculoskeletal: Normal range of motion and neck supple.   Cardiovascular:      Rate and Rhythm: Normal rate and regular rhythm.   Pulmonary:      Effort: Pulmonary effort is normal.      Breath sounds: Normal breath sounds.   Abdominal:      General: Abdomen is flat.      Palpations: Abdomen is soft.   Musculoskeletal: Normal range of motion.   Skin:     General: Skin is warm and dry.   Neurological:      Comments: Patient seems drowsy and somewhat lethargic.  She does open her eyes to questions and answers mostly appropriately be in completely.   Psychiatric:         Mood and Affect: Mood normal.         Behavior: Behavior normal.         Procedures         Lab Results (last 24 hours)     Procedure Component Value Units Date/Time    CBC & Differential [[id number]]  (Normal) Collected: 12/01/20 1848    Specimen: Blood Updated: 12/01/20 1901    Narrative:      The following orders were created for panel order CBC & Differential.  Procedure                               Abnormality         Status                     ---------                               -----------         ------                     CBC Auto Differential[[id number]]        Normal              Final result                 Please view results for these tests on the individual orders.    Comprehensive Metabolic Panel [[id number]]  (Abnormal) Collected: 12/01/20 1848    Specimen: Blood Updated: 12/01/20 1918     Glucose 113 mg/dL      BUN 32 mg/dL      Creatinine 1.70 mg/dL      Sodium 144 mmol/L      Potassium 3.5 mmol/L      Chloride 104 mmol/L      CO2 34.0 mmol/L      Calcium 10.5 mg/dL      Total Protein 5.7 g/dL      Albumin 3.40 g/dL      ALT (SGPT) 9 U/L      AST (SGOT) 18 U/L      Alkaline Phosphatase 103 U/L      Total Bilirubin 0.4 mg/dL      eGFR Non African Amer 28 mL/min/1.73      Globulin 2.3 gm/dL      A/G Ratio 1.5 g/dL       BUN/Creatinine Ratio 18.8     Anion Gap 6.0 mmol/L     Narrative:      GFR Normal >60  Chronic Kidney Disease <60  Kidney Failure <15      Blood Culture - Blood, Arm, Left [[id number]] Collected: 12/01/20 1848    Specimen: Blood from Arm, Left Updated: 12/01/20 1901    Lactic Acid, Plasma [[id number]]  (Normal) Collected: 12/01/20 1848    Specimen: Blood Updated: 12/01/20 1914     Lactate 1.0 mmol/L     aPTT [[id number]]  (Abnormal) Collected: 12/01/20 1848    Specimen: Blood Updated: 12/01/20 1910     PTT 41.4 seconds     Protime-INR [[id number]]  (Abnormal) Collected: 12/01/20 1848    Specimen: Blood Updated: 12/01/20 1910     Protime 29.2 Seconds      INR 2.76    CBC Auto Differential [[id number]]  (Normal) Collected: 12/01/20 1848    Specimen: Blood Updated: 12/01/20 1901     WBC 7.31 10*3/mm3      RBC 4.03 10*6/mm3      Hemoglobin 12.2 g/dL      Hematocrit 38.0 %      MCV 94.3 fL      MCH 30.3 pg      MCHC 32.1 g/dL      RDW 15.2 %      RDW-SD 51.6 fl      MPV 10.7 fL      Platelets 234 10*3/mm3      Neutrophil % 61.3 %      Lymphocyte % 27.4 %      Monocyte % 5.9 %      Eosinophil % 4.4 %      Basophil % 0.7 %      Immature Grans % 0.3 %      Neutrophils, Absolute 4.49 10*3/mm3      Lymphocytes, Absolute 2.00 10*3/mm3      Monocytes, Absolute 0.43 10*3/mm3      Eosinophils, Absolute 0.32 10*3/mm3      Basophils, Absolute 0.05 10*3/mm3      Immature Grans, Absolute 0.02 10*3/mm3      nRBC 0.0 /100 WBC     Blood Culture - Blood, Arm, Right [[id number]] Collected: 12/01/20 1906    Specimen: Blood from Arm, Right Updated: 12/01/20 1915    Urinalysis With Culture If Indicated - Urine, Catheter [[id number]]  (Abnormal) Collected: 12/01/20 2057    Specimen: Urine, Catheter Updated: 12/01/20 2108     Color, UA Yellow     Appearance, UA Turbid     pH, UA 5.5     Specific Gravity, UA 1.014     Glucose, UA Negative     Ketones, UA Negative     Bilirubin, UA Negative     Blood, UA Trace     Protein, UA Negative     Leuk  Esterase, UA Large (3+)     Nitrite, UA Positive     Urobilinogen, UA 1.0 E.U./dL    Urinalysis, Microscopic Only - Urine, Catheter [[id number]]  (Abnormal) Collected: 12/01/20 2057    Specimen: Urine, Catheter Updated: 12/01/20 2108     RBC, UA 0-2 /HPF      WBC, UA Too Numerous to Count /HPF      Bacteria, UA 2+ /HPF      Squamous Epithelial Cells, UA 3-6 /HPF      Hyaline Casts, UA 3-6 /LPF      Methodology Automated Microscopy    Urine Culture - Urine, Urine, Catheter [[id number]] Collected: 12/01/20 2057    Specimen: Urine, Catheter Updated: 12/01/20 2108    COVID PRE-OP / PRE-PROCEDURE SCREENING ORDER (NO ISOLATION) - Swab, Nasopharynx [[id number]]  (Normal) Collected: 12/01/20 2209    Specimen: Swab from Nasopharynx Updated: 12/01/20 2316    Narrative:      The following orders were created for panel order COVID PRE-OP / PRE-PROCEDURE SCREENING ORDER (NO ISOLATION) - Swab, Nasopharynx.  Procedure                               Abnormality         Status                     ---------                               -----------         ------                     COVID-19, ABBOTT IN-HOUS...[[id number]]  Normal              Final result                 Please view results for these tests on the individual orders.    COVID-19, ABBOTT IN-HOUSE,NP Swab (NO TRANSPORT MEDIA) 2 HR TAT - Swab, Nasopharynx [[id number]]  (Normal) Collected: 12/01/20 2209    Specimen: Swab from Nasopharynx Updated: 12/01/20 2316     COVID19 Not Detected    Narrative:      Fact sheet for providers: https://www.fda.gov/media/502464/download     Fact sheet for patients: https://www.fda.gov/media/023738/download    Blood Gas, Arterial - [[id number]]  (Abnormal) Collected: 12/01/20 2210    Specimen: Arterial Blood Updated: 12/01/20 2216     Site Right Radial     Cipriano's Test Positive     pH, Arterial 7.417 pH units      pCO2, Arterial 51.0 mm Hg      Comment: 83 Value above reference range        pO2, Arterial 60.7 mm Hg      Comment: 84 Value below  reference range        HCO3, Arterial 32.8 mmol/L      Comment: 83 Value above reference range        Base Excess, Arterial 7.0 mmol/L      Comment: 83 Value above reference range        O2 Saturation, Arterial 93.7 %      Comment: 84 Value below reference range        Temperature 37.0 C      Barometric Pressure for Blood Gas 757 mmHg      Modality Room Air     Ventilator Mode NA     Collected by [id number]     Comment: Meter: [id number]     :  [id number]        pCO2, Temperature Corrected 51.0 mm Hg      pH, Temp Corrected 7.417 pH Units      pO2, Temperature Corrected 60.7 mm Hg           CT Head Without Contrast   Final Result       1. Moderate cerebral and cerebellar volume loss with chronic   microvascular disease but no evidence of acute intracranial process.           This report was finalized on 12/01/2020 19:52 by Dr. Clyde Griffin MD.      XR Chest 1 View   Final Result   . Expiratory chest with mild basilar atelectasis.   This report was finalized on 12/01/2020 18:48 by Dr. Clyde Griffin MD.          ED Course  ED Course as of Dec 02 1136   Wed Dec 02, 2020   1135 Family arrived at a later and said the patient had a marked decline in her mental status at about 1 PM that afternoon according to reports from the assisted living facility.  That is why they have brought her out today.  She does have this is a marked change in her usual mental status.  I told her that it is appear to be a urinary tract infection and this may have caused her altered mental status because everything else is negative.  I spoke with Sandeep who is covering for Dr. Fine and we will admit    [TR]      ED Course User Index  [TR] Valdemar Villegas Jr., MD          MDM  Number of Diagnoses or Management Options  Acute UTI: new and requires workup  Altered mental status, unspecified altered mental status type: new and requires workup     Amount and/or Complexity of Data Reviewed  Clinical lab tests: ordered and  reviewed  Tests in the radiology section of CPT®: ordered and reviewed  Tests in the medicine section of CPT®: ordered and reviewed  Decide to obtain previous medical records or to obtain history from someone other than the patient: yes    Risk of Complications, Morbidity, and/or Mortality  Presenting problems: moderate  Diagnostic procedures: moderate  Management options: moderate    Patient Progress  Patient progress: stable      Final diagnoses:   Altered mental status, unspecified altered mental status type   Acute UTI          Valdemar Villegas Jr., MD  12/02/20 1134

## 2020-12-02 NOTE — PLAN OF CARE
Goal Outcome Evaluation:  Plan of Care Reviewed With: patient  Progress: no change  Outcome Summary: admitted through ER from assisted living facility with AMS, NIH 6, alert to self, follows some commands, slight R facial droop, R leg weakness, VSS, wounds to L buttock with photos uploaded, turning patient, incontinent, safety maintained

## 2020-12-02 NOTE — H&P
History and Physical      CHIEF COMPLAINT: Mental status changes    Reason for Admission: Altered mental status with acute cystitis present on admission    History Obtained From: Patient/assisted care living/power of     HISTORY OF PRESENT ILLNESS:      The patient is a 87 y.o. female with significant past medical history of significant Alzheimer's dementia currently residing at assisted care living facility who presents with acute mental status changes.  Patient has been difficult to arouse.  In conversing with the power of  this morning she she apparently had a good day yesterday was in the main room eating lunch and then went to lay down.  Upon awakening she was confused and difficult to arouse.  There was some concern of acute CVA when she was brought directly to the emergency room.  In the emergency room she moved all extremities CT of head was unremarkable.  Urinalysis revealed 3+ bacteria nitrite and leukocyte positive.  Currently admitted with acute mental status changes related to acute cystitis present on admission.  No other precipitating or relieving factors.  Extended conference with power of  on phone upon admission and she is a DNR with comfort measures only.    Past Medical History:    Past Medical History:   Diagnosis Date   • Dementia (CMS/HCC)    • Hypertension        Past Surgical History:    Past Surgical History:   Procedure Laterality Date   • HYSTERECTOMY     • KNEE MENISCAL REPAIR         Medications Prior to Admission:    Medications Prior to Admission   Medication Sig Dispense Refill Last Dose   • allopurinol (ZYLOPRIM) 100 MG tablet Take 100 mg by mouth Daily.      • aspirin 81 MG EC tablet Take 81 mg by mouth Daily.      • benazepril (LOTENSIN) 40 MG tablet Take 40 mg by mouth Daily.      • diphenhydrAMINE-acetaminophen (TYLENOL PM)  MG tablet per tablet Take 2 tablets by mouth At Night As Needed for Sleep.      • ergocalciferol (ERGOCALCIFEROL) 1.25 MG  (99890 UT) capsule Take 1 capsule by mouth 2 (Two) Times a Week. (Patient taking differently: Take 50,000 Units by mouth 2 (Two) Times a Week. Monday and thursday) 8 capsule 11    • escitalopram (LEXAPRO) 20 MG tablet Take 1 tablet by mouth Daily. 30 tablet 3    • furosemide (LASIX) 20 MG tablet Take 1 tablet by mouth Daily. 30 tablet 3    • hydroCHLOROthiazide (HYDRODIURIL) 12.5 MG tablet Take 12.5 mg by mouth Daily.      • memantine (NAMENDA XR) 28 MG capsule sustained-release 24 hr extended release capsule Take 1 capsule by mouth Daily. 30 capsule 3    • metoprolol succinate XL (TOPROL-XL) 25 MG 24 hr tablet Take 1 tablet by mouth Daily. 30 tablet 3    • rivaroxaban (XARELTO) 15 MG tablet Take 1 tablet by mouth 2 (Two) Times a Day With Meals for 39 doses. Indications: DVT/PE (active thrombosis) 39 tablet 0    • rivaroxaban (XARELTO) 20 MG tablet Take 1 tablet by mouth Daily With Dinner. Indications: DVT/PE (active thrombosis) 30 tablet 3    • traZODone (DESYREL) 50 MG tablet Take 1 tablet by mouth Every Night. 90 tablet 0        Allergies:  Sulfa antibiotics    Social History:   Social History     Socioeconomic History   • Marital status:      Spouse name: Not on file   • Number of children: Not on file   • Years of education: Not on file   • Highest education level: Not on file   Tobacco Use   • Smoking status: Never Smoker   • Smokeless tobacco: Never Used   Substance and Sexual Activity   • Alcohol use: Never     Frequency: Never   • Drug use: Never   • Sexual activity: Defer       Family History:   History reviewed. No pertinent family history.    REVIEW OF SYSTEMS:    Unable to obtain an accurate review of systems due to the mental status of the patient and during the COVID-19 no family is present.  Extended conference with power of  via phone today.  Copious amounts of review of old records and chart from office today to compile home medicine list and past medical history.      Vital Signs      Temp:  [97.5 °F (36.4 °C)-98.3 °F (36.8 °C)] 97.9 °F (36.6 °C)  Heart Rate:  [52-59] 52  Resp:  [14-18] 18  BP: (101-134)/(47-90) 134/50          Physical Exam:  Constitutional: The patient is not oriented to person, place, or time. They appear well-developed.   HEENT:   Head: Normocephalic and atraumatic.   Eyes: Pupils are equal, round, and reactive to light.   Neck: Neck supple without masses or carotid bruit.  Cardiovascular: Regular rhythm and normal heart sounds.    Pulmonary/Chest: Effort normal and breath sounds normal. CTAB  Abdominal: Soft. Bowel sounds are normal. There is  no distension or  Tenderness appreciated. There is no rebound and no guarding.   Musculoskeletal: Normal range of motion. There is  no edema or tenderness.   Neurological: Significantly demented but pleasant.  Does not recognize me or can name me upon entering the room.  This is her baseline.  Skin: Skin is warm and dry without significant rashes     Results Review:   I reviewed the patient's new imaging results and agree with the interpretation.    DATA:  Lab Results (last 24 hours)     Procedure Component Value Units Date/Time    COVID PRE-OP / PRE-PROCEDURE SCREENING ORDER (NO ISOLATION) - Swab, Nasopharynx [[id number]]  (Normal) Collected: 12/01/20 2209    Specimen: Swab from Nasopharynx Updated: 12/01/20 7549    Narrative:      The following orders were created for panel order COVID PRE-OP / PRE-PROCEDURE SCREENING ORDER (NO ISOLATION) - Swab, Nasopharynx.  Procedure                               Abnormality         Status                     ---------                               -----------         ------                     COVID-19, ABBOTT IN-HOUS...[[id number]]  Normal              Final result                 Please view results for these tests on the individual orders.    COVID-19, ABBOTT IN-HOUSE,NP Swab (NO TRANSPORT MEDIA) 2 HR TAT - Swab, Nasopharynx [[id number]]  (Normal) Collected: 12/01/20 2209    Specimen: Swab from  Nasopharynx Updated: 12/01/20 2316     COVID19 Not Detected    Narrative:      Fact sheet for providers: https://www.fda.gov/media/349109/download     Fact sheet for patients: https://www.fda.gov/media/159474/download    Blood Gas, Arterial - [[id number]]  (Abnormal) Collected: 12/01/20 2210    Specimen: Arterial Blood Updated: 12/01/20 2216     Site Right Radial     Cipriano's Test Positive     pH, Arterial 7.417 pH units      pCO2, Arterial 51.0 mm Hg      Comment: 83 Value above reference range        pO2, Arterial 60.7 mm Hg      Comment: 84 Value below reference range        HCO3, Arterial 32.8 mmol/L      Comment: 83 Value above reference range        Base Excess, Arterial 7.0 mmol/L      Comment: 83 Value above reference range        O2 Saturation, Arterial 93.7 %      Comment: 84 Value below reference range        Temperature 37.0 C      Barometric Pressure for Blood Gas 757 mmHg      Modality Room Air     Ventilator Mode NA     Collected by [id number]     Comment: Meter: [id number]     :  [id number]        pCO2, Temperature Corrected 51.0 mm Hg      pH, Temp Corrected 7.417 pH Units      pO2, Temperature Corrected 60.7 mm Hg     Urinalysis With Culture If Indicated - Urine, Catheter [[id number]]  (Abnormal) Collected: 12/01/20 2057    Specimen: Urine, Catheter Updated: 12/01/20 2108     Color, UA Yellow     Appearance, UA Turbid     pH, UA 5.5     Specific Gravity, UA 1.014     Glucose, UA Negative     Ketones, UA Negative     Bilirubin, UA Negative     Blood, UA Trace     Protein, UA Negative     Leuk Esterase, UA Large (3+)     Nitrite, UA Positive     Urobilinogen, UA 1.0 E.U./dL    Urinalysis, Microscopic Only - Urine, Catheter [[id number]]  (Abnormal) Collected: 12/01/20 2057    Specimen: Urine, Catheter Updated: 12/01/20 2108     RBC, UA 0-2 /HPF      WBC, UA Too Numerous to Count /HPF      Bacteria, UA 2+ /HPF      Squamous Epithelial Cells, UA 3-6 /HPF      Hyaline Casts, UA 3-6 /LPF       Methodology Automated Microscopy    Urine Culture - Urine, Urine, Catheter [[id number]] Collected: 12/01/20 2057    Specimen: Urine, Catheter Updated: 12/01/20 2108    Comprehensive Metabolic Panel [[id number]]  (Abnormal) Collected: 12/01/20 1848    Specimen: Blood Updated: 12/01/20 1918     Glucose 113 mg/dL      BUN 32 mg/dL      Creatinine 1.70 mg/dL      Sodium 144 mmol/L      Potassium 3.5 mmol/L      Chloride 104 mmol/L      CO2 34.0 mmol/L      Calcium 10.5 mg/dL      Total Protein 5.7 g/dL      Albumin 3.40 g/dL      ALT (SGPT) 9 U/L      AST (SGOT) 18 U/L      Alkaline Phosphatase 103 U/L      Total Bilirubin 0.4 mg/dL      eGFR Non African Amer 28 mL/min/1.73      Globulin 2.3 gm/dL      A/G Ratio 1.5 g/dL      BUN/Creatinine Ratio 18.8     Anion Gap 6.0 mmol/L     Narrative:      GFR Normal >60  Chronic Kidney Disease <60  Kidney Failure <15      Blood Culture - Blood, Arm, Right [[id number]] Collected: 12/01/20 1906    Specimen: Blood from Arm, Right Updated: 12/01/20 1915    Lactic Acid, Plasma [[id number]]  (Normal) Collected: 12/01/20 1848    Specimen: Blood Updated: 12/01/20 1914     Lactate 1.0 mmol/L     aPTT [[id number]]  (Abnormal) Collected: 12/01/20 1848    Specimen: Blood Updated: 12/01/20 1910     PTT 41.4 seconds     Protime-INR [[id number]]  (Abnormal) Collected: 12/01/20 1848    Specimen: Blood Updated: 12/01/20 1910     Protime 29.2 Seconds      INR 2.76    CBC & Differential [[id number]]  (Normal) Collected: 12/01/20 1848    Specimen: Blood Updated: 12/01/20 1901    Narrative:      The following orders were created for panel order CBC & Differential.  Procedure                               Abnormality         Status                     ---------                               -----------         ------                     CBC Auto Differential[[id number]]        Normal              Final result                 Please view results for these tests on the individual orders.    CBC Auto  Differential [[id number]]  (Normal) Collected: 12/01/20 1848    Specimen: Blood Updated: 12/01/20 1901     WBC 7.31 10*3/mm3      RBC 4.03 10*6/mm3      Hemoglobin 12.2 g/dL      Hematocrit 38.0 %      MCV 94.3 fL      MCH 30.3 pg      MCHC 32.1 g/dL      RDW 15.2 %      RDW-SD 51.6 fl      MPV 10.7 fL      Platelets 234 10*3/mm3      Neutrophil % 61.3 %      Lymphocyte % 27.4 %      Monocyte % 5.9 %      Eosinophil % 4.4 %      Basophil % 0.7 %      Immature Grans % 0.3 %      Neutrophils, Absolute 4.49 10*3/mm3      Lymphocytes, Absolute 2.00 10*3/mm3      Monocytes, Absolute 0.43 10*3/mm3      Eosinophils, Absolute 0.32 10*3/mm3      Basophils, Absolute 0.05 10*3/mm3      Immature Grans, Absolute 0.02 10*3/mm3      nRBC 0.0 /100 WBC     Blood Culture - Blood, Arm, Left [[id number]] Collected: 12/01/20 1848    Specimen: Blood from Arm, Left Updated: 12/01/20 1901        Imaging Results (Last 24 Hours)     Procedure Component Value Units Date/Time    CT Head Without Contrast [[id number]] Collected: 12/01/20 1950     Updated: 12/01/20 1955    Narrative:      CT BRAIN without contrast 12/1/2020 7:34 PM CST     HISTORY: Mental status changes     COMPARISON: 11/8/2020      DLP: 663 mGy cm. Automated exposure control was utilized to diminish  patient radiation dose.     TECHNIQUE: Serial axial tomographic images of the brain were obtained  without the use of intravenous contrast.      FINDINGS:   The midline structures are nondisplaced. There is moderate cerebral and  cerebellar volume loss, with an associated increase in the prominence of  the ventricles and sulci. The basilar cisterns are normal in size and  configuration. There is no evidence of intracranial hemorrhage or  mass-effect. There is low attenuation in the periventricular white  matter, consistent with chronic ischemic change. There are no abnormal  extra-axial fluid collections. There is no evidence of tonsillar  herniation.      The included orbits and  their contents are unremarkable. The visualized  paranasal sinuses, mastoid air cells and middle ear cavities are clear.  The visualized osseous structures and overlying soft tissues of the  skull and face are intact.        Impression:         1. Moderate cerebral and cerebellar volume loss with chronic  microvascular disease but no evidence of acute intracranial process.        This report was finalized on 12/01/2020 19:52 by Dr. Clyde Griffin MD.    XR Chest 1 View [[id number]] Collected: 12/01/20 1847     Updated: 12/01/20 1851    Narrative:      EXAMINATION: Chest 1 view 12/1/2020     HISTORY: Weakness     FINDINGS: Today's exam is compared to previous study of 11/8/2020. Lungs  are hypoventilated with mild basilar atelectasis. Lungs are otherwise  clear. There is no effusion or free air present. There is mild  cardiomegaly with an ectatic thoracic aorta.       Impression:      . Expiratory chest with mild basilar atelectasis.  This report was finalized on 12/01/2020 18:48 by Dr. Clyde Griffin MD.            ASSESSMENT AND PLAN:      1.     Alzheimer's dementia, late onset, with behavioral disturbance (CMS/HCC)    Essential hypertension    Acute on chronic systolic congestive heart failure (CMS/HCC)    Chronic diastolic CHF (congestive heart failure) (CMS/HCC)    Altered mental status    Acute cystitis with hematuria    Will culture urine, place patient on Rocephin 1 g IV every 24.  Conference with power of  today will take 24 to 48 hours to obtain culture and sensitivity and convert to oral antibiotics.  Gently rehydrate.  Comfort measures only per power of  and previous MOST form filled out and in the chart.        Arthur Fine MD  08:07 CST 12/2/2020

## 2020-12-02 NOTE — PLAN OF CARE
Goal Outcome Evaluation:  Plan of Care Reviewed With: patient  Progress: no change(Initial Evaluation)  Outcome Summary: Patient is more alert this evening- laughing and able to feed self supper without problems- alert to self only but participating in ADLs, turning every 2 hours, VSS, IVF going, bed alarm on

## 2020-12-02 NOTE — THERAPY DISCHARGE NOTE
Acute Care - Speech Language Pathology   Swallow Initial Evaluation/Discharge  [place]     Patient Name: Nichole Mcgregor  : 3/31/1933  MRN: [id number]  Today's Date: 2020               Admit Date: 2020  Clinical bedside swallow evaluation completed. The patient is alert and cooperative.    Primary problem: AMS, acute cystitis. Medical hx: Alzheimer's dementia.     Oral motor assessment is WFL. Unsure of patient baseline; however, she is able to communicate with me and answer simple questions. Follows simple commands. She completed a full range of consistencies except for mech soft. No overt s/s of aspiration are observed. Functional chew with regular solids.     SLP recommends: 1) Regular diet 2) Thin liquids 3) Meds whole as tolerated 4) Oral care and standard swallow precautions. SLP signing off. If any concerns please reconsult.   Norah Wu MS CCC-SLP 2020 12:25 CST      Visit Dx:    ICD-10-CM ICD-9-CM   1. Altered mental status, unspecified altered mental status type  R41.82 780.97   2. Acute UTI  N39.0 599.0   3. Dysphagia, unspecified type  R13.10 787.20     Patient Active Problem List   Diagnosis   • Acute pulmonary embolism with acute cor pulmonale (CMS/HCC)   • Alzheimer's dementia, late onset, with behavioral disturbance (CMS/HCC)   • Essential hypertension   • Acute on chronic systolic congestive heart failure (CMS/HCC)   • Chronic diastolic CHF (congestive heart failure) (CMS/HCC)   • Altered mental status   • Acute cystitis with hematuria     Past Medical History:   Diagnosis Date   • Dementia (CMS/HCC)    • Hypertension      Past Surgical History:   Procedure Laterality Date   • HYSTERECTOMY     • KNEE MENISCAL REPAIR            SWALLOW EVALUATION (last 72 hours)      SLP Adult Swallow Evaluation     Row Name 20 0959                   Rehab Evaluation    Document Type  evaluation  -MM        Subjective Information  no complaints  -MM        Patient Observations   alert;cooperative;agree to therapy  -MM        Patient/Family/Caregiver Comments/Observations  No family present.   -MM        Care Plan Review  evaluation/treatment results reviewed  -MM        Care Plan Review, Other Participant(s)  other (see comments) NASIM Sevilla  -DEZ        Patient Effort  good  -MM        Symptoms Noted During/After Treatment  none  -MM           General Information    Patient Profile Reviewed  yes  -MM        Pertinent History Of Current Problem  Primary problem: AMS, acute cystitis. Medical hx: Alzheimer's dementia.   -MM        Current Method of Nutrition  NPO  -MM        Precautions/Limitations, Vision  WFL;for purposes of eval  -MM        Precautions/Limitations, Hearing  WFL;for purposes of eval  -MM        Prior Level of Function-Communication  unknown Unsure of baseline. Alzheimer's dementia  -MM        Prior Level of Function-Swallowing  unknown  -MM        Plans/Goals Discussed with  patient;other (see comments);agreed upon NASIM ZHENG        Barriers to Rehab  none identified  -MM        Patient's Goals for Discharge  return to PO diet  -MM           Pain    Additional Documentation  Pain Scale: FACES Pre/Post-Treatment (Group)  -MM           Pain Scale: FACES Pre/Post-Treatment    Pain: FACES Scale, Pretreatment  0-->no hurt  -MM        Posttreatment Pain Rating  0-->no hurt  -MM           Oral Motor Structure and Function    Dentition Assessment  natural, present and adequate  -MM        Secretion Management  WNL/WFL  -MM        Mucosal Quality  moist, healthy  -MM        Volitional Swallow  WFL  -MM        Volitional Cough  WFL  -MM           Oral Musculature and Cranial Nerve Assessment    Oral Motor General Assessment  WFL  -MM           General Eating/Swallowing Observations    Respiratory Support Currently in Use  room air  -MM        Eating/Swallowing Skills  fed by SLP  -MM        Positioning During Eating  upright in bed  -MM        Utensils Used  spoon;straw  -MM         Consistencies Trialed  regular textures;honey-thick liquids;nectar/syrup-thick liquids;thin liquids;pureed  -MM           Clinical Swallow Eval    Oral Prep Phase  WFL  -MM        Oral Transit  WFL  -MM        Oral Residue  WFL  -MM        Pharyngeal Phase  no overt signs/symptoms of pharyngeal impairment  -MM        Esophageal Phase  unremarkable  -MM        Clinical Swallow Evaluation Summary  Clinical bedside swallow evaluation completed. The patient is alert and cooperative.Primary problem: AMS, acute cystitis. Medical hx: Alzheimer's dementia. Oral motor assessment is WFL. Unsure of patient baseline; however, she is able to communicate with me and answer simple questions. Follows simple commands. She completed a full range of consistencies except for mech soft. No overt s/s of aspiration are observed. Functional chew with regular solids. SLP recommends: 1) Regular diet 2) Thin liquids 3) Meds whole as tolerated 4) Oral care and standard swallow precautions. SLP signing off. If any concerns please reconsult.   -MM           Clinical Impression    SLP Swallowing Diagnosis  swallow WFL  -MM        Functional Impact  no impact on function  -MM        Swallow Criteria for Skilled Therapeutic Interventions Met  no problems identified which require skilled intervention  -MM           Recommendations    Therapy Frequency (Swallow)  evaluation only  -MM        Predicted Duration Therapy Intervention (Days)  until discharge  -MM        SLP Diet Recommendation  regular textures;thin liquids  -MM        Recommended Diagnostics  No further SLP services recommended  -MM        Recommended Precautions and Strategies  upright posture during/after eating;small bites of food and sips of liquid;general aspiration precautions;fatigue precautions  -MM        Oral Care Recommendations  Oral Care BID/PRN  -MM        SLP Rec. for Method of Medication Administration  meds whole;as tolerated  -MM        Monitor for Signs of Aspiration   yes;notify SLP if any concerns  -MM        Anticipated Discharge Disposition (SLP)  skilled nursing facility  -MM          User Key  (r) = Recorded By, (t) = Taken By, (c) = Cosigned By    Initials Name Effective Dates    Norah Barrios, MS CCC-SLP 07/12/20 -           EDUCATION  The patient has been educated in the following areas:   Dysphagia (Swallowing Impairment) Oral Care/Hydration.    SLP Recommendation and Plan  SLP Swallowing Diagnosis: swallow WFL  SLP Diet Recommendation: regular textures, thin liquids     Monitor for Signs of Aspiration: yes, notify SLP if any concerns  Recommended Diagnostics: No further SLP services recommended  Swallow Criteria for Skilled Therapeutic Interventions Met: no problems identified which require skilled intervention  Anticipated Discharge Disposition (SLP): skilled nursing facility     Therapy Frequency (Swallow): evaluation only  Predicted Duration Therapy Intervention (Days): until discharge           Anticipated Discharge Disposition (SLP): skilled nursing facility        Reason for Discharge: all goals and outcomes met, no further needs identified    Plan of Care Reviewed With: patient, other (see comments)(NASIM Sevilla)  Progress: no change(Initial Evaluation)  Outcome Summary: Clinical bedside swallow evaluation completed. The patient is alert and cooperative.Primary problem: AMS, acute cystitis. Medical hx: Alzheimer's dementia. Oral motor assessment is WFL. Unsure of patient baseline; however, she is able to communicate with me and answer simple questions. Follows simple commands. She completed a full range of consistencies except for mech soft. No overt s/s of aspiration are observed. Functional chew with regular solids. SLP recommends: 1) Regular diet 2) Thin liquids 3) Meds whole as tolerated 4) Oral care and standard swallow precautions. SLP signing off. If any concerns please reconsult.             Time Calculation:   Time Calculation- SLP     Row Name  12/02/20 1224             Time Calculation- SLP    SLP Start Time  0959  -MM      SLP Stop Time  1059  -MM      SLP Time Calculation (min)  60 min  -MM      SLP Received On  12/02/20  -MM        User Key  (r) = Recorded By, (t) = Taken By, (c) = Cosigned By    Initials Name Provider Type    Norah Barrios MS CCC-SLP Speech and Language Pathologist          Therapy Charges for Today     Code Description Service Date Service Provider Modifiers Qty    [id number] HC ST EVAL ORAL PHARYNG SWALLOW 4 12/2/2020 Norah Wu MS CCC-SLP GN 1               SLP Discharge Summary  Anticipated Discharge Disposition (SLP): skilled nursing facility  Reason for Discharge: all goals and outcomes met, no further needs identified  Progress Toward Achieving Short/long Term Goals: all goals met within established timelines  Discharge Destination: other (see comments)(Remains in acute care)    Norah Wu MS CCC-SLP  12/2/2020

## 2020-12-02 NOTE — PLAN OF CARE
Problem: Adult Inpatient Plan of Care  Goal: Plan of Care Review  Outcome: Ongoing, Progressing  Flowsheets (Taken 12/2/2020 1222)  Progress: (Initial Evaluation) no change  Plan of Care Reviewed With: (NASIM Sevilla)   patient   other (see comments)  Outcome Summary:   Clinical bedside swallow evaluation completed. The patient is alert and cooperative.Primary problem: AMS, acute cystitis. Medical hx: Alzheimer's dementia. Oral motor assessment is WFL. Unsure of patient baseline   however, she is able to communicate with me and answer simple questions. Follows simple commands. She completed a full range of consistencies except for mech soft. No overt s/s of aspiration are observed. Functional chew with regular solids. SLP recommends: 1) Regular diet 2) Thin liquids 3) Meds whole as tolerated 4) Oral care and standard swallow precautions. SLP signing off. If any concerns please reconsult.   Goal Outcome Evaluation:  Plan of Care Reviewed With: patient, other (see comments)(NASIM Sevilla)  Progress: no change(Initial Evaluation)  Outcome Summary: Clinical bedside swallow evaluation completed. The patient is alert and cooperative.Primary problem: AMS, acute cystitis. Medical hx: Alzheimer's dementia. Oral motor assessment is WFL. Unsure of patient baseline; however, she is able to communicate with me and answer simple questions. Follows simple commands. She completed a full range of consistencies except for mech soft. No overt s/s of aspiration are observed. Functional chew with regular solids. SLP recommends: 1) Regular diet 2) Thin liquids 3) Meds whole as tolerated 4) Oral care and standard swallow precautions. SLP signing off. If any concerns please reconsult.

## 2020-12-03 NOTE — PROGRESS NOTES
Continued Stay Note  ISAK Rucker     Patient Name: Nichole Mcgregor  MRN: [id number]  Today's Date: 12/3/2020    Admit Date: 12/1/2020    Discharge Plan     Row Name 12/03/20 1524       Plan    Plan Comments  [facility] and [facility] will be here at 2pm tomorrow (12-4) to pick pt up. Pt will need a new Covid test prior to discharging tomorrow.        Discharge Codes    No documentation.             JUANCARLOS Monique

## 2020-12-03 NOTE — PLAN OF CARE
"  Problem: Adult Inpatient Plan of Care  Goal: Patient-Specific Goal (Individualized)  Outcome: Ongoing, Progressing  Flowsheets (Taken 12/3/2020 6480)  Patient-Specific Goals (Include Timeframe): Mental status improvement before d/c   Goal Outcome Evaluation:  Plan of Care Reviewed With: patient  Progress: no change  Outcome Summary: Alert and oriented to self only. Denies pain. Did pull IV out at beginning of shift. VAT restarted this afternoon. IVF infusing. Patient states there is a little boy and girl in her room and she thinks someone is under her bed and patient laughs often at \"them.\" Reoriented often. Turn Q2 as patient allows. SCDS on. Tolerating regular diet. Bed alarm. Continue to monitor.  "

## 2020-12-03 NOTE — NURSING NOTE
Attempted to find IV access x2 in patient's R AC and R hand. No access. VAT consult placed per protocol.

## 2020-12-03 NOTE — PROGRESS NOTES
Discharge Planning Assessment  [facility]     Patient Name: Nichole Mcgregor  MRN: [id number]  Today's Date: 12/3/2020    Admit Date: 12/1/2020    Discharge Needs Assessment     Row Name 12/03/20 1111       Living Environment    Lives With  facility resident    Name(s) of Who Lives With Patient  Pt is from [facility]    Current Living Arrangements  independent/assisted living facility    Primary Care Provided by  other (see comments)    Provides Primary Care For  no one    Family Caregiver if Needed  child(marie), adult;other (see comments)    Family Caregiver Names  Carla (POMARGA) [phone]    Quality of Family Relationships  helpful;involved    Able to Return to Prior Arrangements  yes    Living Arrangement Comments  Plan is for return to the [facility] with Saige BRYSON       Resource/Environmental Concerns    Resource/Environmental Concerns  none    Transportation Concerns  car, none       Transition Planning    Patient/Family Anticipates Transition to  home    Patient/Family Anticipated Services at Transition  home health care    Transportation Anticipated  family or friend will provide       Discharge Needs Assessment    Readmission Within the Last 30 Days  no previous admission in last 30 days    Current Outpatient/Agency/Support Group  assisted living facility;homecare agency    Equipment Currently Used at Home  walker, rolling    Concerns to be Addressed  denies needs/concerns at this time    Anticipated Changes Related to Illness  none    Equipment Needed After Discharge  none    Outpatient/Agency/Support Group Needs  homecare agency    Discharge Facility/Level of Care Needs  home with home health    Discharge Coordination/Progress  Pt is from the [facility] and plans return tomorrow. Notified pt POA (Carla) of anticipated DC for tomorrow. Pt is followed by [facility] ([phone]). Will need updated HH orders at dc. Left message with Nikky (home) to inform of dc back tomorrow as well.        Discharge Plan     No documentation.       Continued Care and Services - Admitted Since 12/1/2020     Home Medical Care     Service Provider Request Status Selected Services Address Phone Fax Patient Preferred    [facility]  Pending - No Request Sent N/A [address], [facility] [address] [phone] --            Selected Continued Care - Prior Encounters Includes selections from prior encounters from 9/2/2020 to 12/3/2020    Discharged on 11/11/2020 Admission date: 11/8/2020 - Discharge disposition: Home-Health Care [facility] [place] [place]    Home Medical Care     Service Provider Selected Services Address Phone Fax Patient Preferred    [facility]  Home Health Services [address], [facility] [address] [phone] --                      Demographic Summary    No documentation.       Functional Status    No documentation.       Psychosocial    No documentation.       Abuse/Neglect    No documentation.       Legal    No documentation.       Substance Abuse    No documentation.       Patient Forms    No documentation.           JUANCARLOS Monique

## 2020-12-03 NOTE — PLAN OF CARE
Goal Outcome Evaluation:  Plan of Care Reviewed With: patient  Progress: no change  Outcome Summary: patient more alert this shift, but oriented to self only, pleasantly confused, incontinence continues, IVF continue, IV ABX continue, turning q2h, incontinence care, bed alarm set for safety

## 2020-12-03 NOTE — PROGRESS NOTES
"Nichole Mcgregor is a 87 y.o. female patient.    More alert today.  No new issues.    Current Facility-Administered Medications   Medication Dose Route Frequency Provider Last Rate Last Admin   • cefTRIAXone (ROCEPHIN) 1 g/100 mL 0.9% NS (MBP)  1 g Intravenous Q24H Arthur Fine MD   Stopped at 12/02/20 2145   • escitalopram (LEXAPRO) tablet 20 mg  20 mg Oral Daily Arthur Fine MD   20 mg at 12/02/20 1450   • furosemide (LASIX) tablet 20 mg  20 mg Oral Daily Arthur Fine MD   20 mg at 12/02/20 1454   • memantine (NAMENDA) tablet 10 mg  10 mg Oral Q12H Arthur Fine MD   10 mg at 12/02/20 2030   • metoprolol succinate XL (TOPROL-XL) 24 hr tablet 25 mg  25 mg Oral Daily Arthur Fine MD       • rivaroxaban (XARELTO) tablet 20 mg  20 mg Oral Daily With Dinner Arthur Fine MD   20 mg at 12/02/20 1729   • sodium chloride 0.9 % flush 10 mL  10 mL Intravenous PRN Valdemar Villegas Jr., MD       • sodium chloride 0.9 % flush 10 mL  10 mL Intravenous Q12H Arthur Fine MD   10 mL at 12/02/20 2030   • sodium chloride 0.9 % flush 10 mL  10 mL Intravenous PRN Arthur Fine MD       • sodium chloride 0.9 % infusion  75 mL/hr Intravenous Continuous Ramon Estrella PA 75 mL/hr at 12/03/20 0644 75 mL/hr at 12/03/20 0644     ALLERGIES:    Allergies   Allergen Reactions   • Sulfa Antibiotics Unknown - High Severity       Alzheimer's dementia, late onset, with behavioral disturbance (CMS/HCC)    Essential hypertension    Acute on chronic systolic congestive heart failure (CMS/HCC)    Chronic diastolic CHF (congestive heart failure) (CMS/HCC)    Altered mental status    Acute cystitis with hematuria    Blood pressure 145/50, pulse 60, temperature 97.7 °F (36.5 °C), temperature source Oral, resp. rate 18, height 165.1 cm (65\"), weight 81.1 kg (178 lb 14.4 oz), SpO2 96 %, not currently breastfeeding.      Subjective:  Symptoms:  Stable.    Diet:  Poor intake.    " "  Activity level: Impaired due to weakness.    Pain:  She reports no pain.      Review of Systems  Objective:  General Appearance:  Comfortable and well-appearing.    Vital signs: (most recent): Blood pressure 145/50, pulse 60, temperature 97.7 °F (36.5 °C), temperature source Oral, resp. rate 18, height 165.1 cm (65\"), weight 81.1 kg (178 lb 14.4 oz), SpO2 96 %, not currently breastfeeding.    Output: Producing urine and minimal stool output.    HEENT: Normal HEENT exam.    Lungs:  Normal effort and normal respiratory rate.    Heart: Normal rate.  Regular rhythm.    Abdomen: Abdomen is soft.    Neurological: (Pleasantly confused).    Skin:  Warm and dry.              Labs:  Lab Results (last 72 hours)     Procedure Component Value Units Date/Time    Extra Tubes [[id number]] Collected: 12/03/20 0328    Specimen: Blood, Venous Line Updated: 12/03/20 0545    Narrative:      The following orders were created for panel order Extra Tubes.  Procedure                               Abnormality         Status                     ---------                               -----------         ------                     Lavender Top[[id number]]                                     Final result                 Please view results for these tests on the individual orders.    Lavender Top [[id number]] Collected: 12/03/20 0328    Specimen: Blood Updated: 12/03/20 0545     Extra Tube hold for add-on     Comment: Auto resulted       Basic Metabolic Panel [[id number]]  (Abnormal) Collected: 12/03/20 0328    Specimen: Blood Updated: 12/03/20 0500     Glucose 86 mg/dL      BUN 23 mg/dL      Creatinine 1.25 mg/dL      Sodium 145 mmol/L      Potassium 3.2 mmol/L      Chloride 108 mmol/L      CO2 31.0 mmol/L      Calcium 9.6 mg/dL      eGFR Non African Amer 41 mL/min/1.73      BUN/Creatinine Ratio 18.4     Anion Gap 6.0 mmol/L     Narrative:      GFR Normal >60  Chronic Kidney Disease <60  Kidney Failure <15      Blood Culture - Blood, Arm, Right " [[id number]] Collected: 12/01/20 1906    Specimen: Blood from Arm, Right Updated: 12/02/20 1930     Blood Culture No growth at 24 hours    Blood Culture - Blood, Arm, Left [[id number]] Collected: 12/01/20 1848    Specimen: Blood from Arm, Left Updated: 12/02/20 1915     Blood Culture No growth at 24 hours    Urine Culture - Urine, Urine, Catheter [[id number]]  (Abnormal) Collected: 12/01/20 2057    Specimen: Urine, Catheter Updated: 12/02/20 1209     Urine Culture >100,000 CFU/mL Escherichia coli    COVID PRE-OP / PRE-PROCEDURE SCREENING ORDER (NO ISOLATION) - Swab, Nasopharynx [[id number]]  (Normal) Collected: 12/01/20 2209    Specimen: Swab from Nasopharynx Updated: 12/01/20 2316    Narrative:      The following orders were created for panel order COVID PRE-OP / PRE-PROCEDURE SCREENING ORDER (NO ISOLATION) - Swab, Nasopharynx.  Procedure                               Abnormality         Status                     ---------                               -----------         ------                     COVID-19, ABBOTT IN-HOUS...[[id number]]  Normal              Final result                 Please view results for these tests on the individual orders.    COVID-19, ABBOTT IN-HOUSE,NP Swab (NO TRANSPORT MEDIA) 2 HR TAT - Swab, Nasopharynx [[id number]]  (Normal) Collected: 12/01/20 2209    Specimen: Swab from Nasopharynx Updated: 12/01/20 2316     COVID19 Not Detected    Narrative:      Fact sheet for providers: https://www.fda.gov/media/648851/download     Fact sheet for patients: https://www.fda.gov/media/687915/download    Blood Gas, Arterial - [[id number]]  (Abnormal) Collected: 12/01/20 2210    Specimen: Arterial Blood Updated: 12/01/20 2216     Site Right Radial     Cipriano's Test Positive     pH, Arterial 7.417 pH units      pCO2, Arterial 51.0 mm Hg      Comment: 83 Value above reference range        pO2, Arterial 60.7 mm Hg      Comment: 84 Value below reference range        HCO3, Arterial 32.8 mmol/L      Comment: 83  Value above reference range        Base Excess, Arterial 7.0 mmol/L      Comment: 83 Value above reference range        O2 Saturation, Arterial 93.7 %      Comment: 84 Value below reference range        Temperature 37.0 C      Barometric Pressure for Blood Gas 757 mmHg      Modality Room Air     Ventilator Mode NA     Collected by [id number]     Comment: Meter: [id number]     :  [id number]        pCO2, Temperature Corrected 51.0 mm Hg      pH, Temp Corrected 7.417 pH Units      pO2, Temperature Corrected 60.7 mm Hg     Urinalysis With Culture If Indicated - Urine, Catheter [[id number]]  (Abnormal) Collected: 12/01/20 2057    Specimen: Urine, Catheter Updated: 12/01/20 2108     Color, UA Yellow     Appearance, UA Turbid     pH, UA 5.5     Specific Gravity, UA 1.014     Glucose, UA Negative     Ketones, UA Negative     Bilirubin, UA Negative     Blood, UA Trace     Protein, UA Negative     Leuk Esterase, UA Large (3+)     Nitrite, UA Positive     Urobilinogen, UA 1.0 E.U./dL    Urinalysis, Microscopic Only - Urine, Catheter [[id number]]  (Abnormal) Collected: 12/01/20 2057    Specimen: Urine, Catheter Updated: 12/01/20 2108     RBC, UA 0-2 /HPF      WBC, UA Too Numerous to Count /HPF      Bacteria, UA 2+ /HPF      Squamous Epithelial Cells, UA 3-6 /HPF      Hyaline Casts, UA 3-6 /LPF      Methodology Automated Microscopy    Comprehensive Metabolic Panel [[id number]]  (Abnormal) Collected: 12/01/20 1848    Specimen: Blood Updated: 12/01/20 1918     Glucose 113 mg/dL      BUN 32 mg/dL      Creatinine 1.70 mg/dL      Sodium 144 mmol/L      Potassium 3.5 mmol/L      Chloride 104 mmol/L      CO2 34.0 mmol/L      Calcium 10.5 mg/dL      Total Protein 5.7 g/dL      Albumin 3.40 g/dL      ALT (SGPT) 9 U/L      AST (SGOT) 18 U/L      Alkaline Phosphatase 103 U/L      Total Bilirubin 0.4 mg/dL      eGFR Non African Amer 28 mL/min/1.73      Globulin 2.3 gm/dL      A/G Ratio 1.5 g/dL      BUN/Creatinine Ratio 18.8     Anion  Gap 6.0 mmol/L     Narrative:      GFR Normal >60  Chronic Kidney Disease <60  Kidney Failure <15      Lactic Acid, Plasma [[id number]]  (Normal) Collected: 12/01/20 1848    Specimen: Blood Updated: 12/01/20 1914     Lactate 1.0 mmol/L     aPTT [[id number]]  (Abnormal) Collected: 12/01/20 1848    Specimen: Blood Updated: 12/01/20 1910     PTT 41.4 seconds     Protime-INR [[id number]]  (Abnormal) Collected: 12/01/20 1848    Specimen: Blood Updated: 12/01/20 1910     Protime 29.2 Seconds      INR 2.76    CBC & Differential [[id number]]  (Normal) Collected: 12/01/20 1848    Specimen: Blood Updated: 12/01/20 1901    Narrative:      The following orders were created for panel order CBC & Differential.  Procedure                               Abnormality         Status                     ---------                               -----------         ------                     CBC Auto Differential[[id number]]        Normal              Final result                 Please view results for these tests on the individual orders.    CBC Auto Differential [[id number]]  (Normal) Collected: 12/01/20 1848    Specimen: Blood Updated: 12/01/20 1901     WBC 7.31 10*3/mm3      RBC 4.03 10*6/mm3      Hemoglobin 12.2 g/dL      Hematocrit 38.0 %      MCV 94.3 fL      MCH 30.3 pg      MCHC 32.1 g/dL      RDW 15.2 %      RDW-SD 51.6 fl      MPV 10.7 fL      Platelets 234 10*3/mm3      Neutrophil % 61.3 %      Lymphocyte % 27.4 %      Monocyte % 5.9 %      Eosinophil % 4.4 %      Basophil % 0.7 %      Immature Grans % 0.3 %      Neutrophils, Absolute 4.49 10*3/mm3      Lymphocytes, Absolute 2.00 10*3/mm3      Monocytes, Absolute 0.43 10*3/mm3      Eosinophils, Absolute 0.32 10*3/mm3      Basophils, Absolute 0.05 10*3/mm3      Immature Grans, Absolute 0.02 10*3/mm3      nRBC 0.0 /100 WBC           Imaging Results (Last 72 Hours)     Procedure Component Value Units Date/Time    CT Head Without Contrast [[id number]] Collected: 12/01/20 1950      Updated: 12/01/20 1955    Narrative:      CT BRAIN without contrast 12/1/2020 7:34 PM CST     HISTORY: Mental status changes     COMPARISON: 11/8/2020      DLP: 663 mGy cm. Automated exposure control was utilized to diminish  patient radiation dose.     TECHNIQUE: Serial axial tomographic images of the brain were obtained  without the use of intravenous contrast.      FINDINGS:   The midline structures are nondisplaced. There is moderate cerebral and  cerebellar volume loss, with an associated increase in the prominence of  the ventricles and sulci. The basilar cisterns are normal in size and  configuration. There is no evidence of intracranial hemorrhage or  mass-effect. There is low attenuation in the periventricular white  matter, consistent with chronic ischemic change. There are no abnormal  extra-axial fluid collections. There is no evidence of tonsillar  herniation.      The included orbits and their contents are unremarkable. The visualized  paranasal sinuses, mastoid air cells and middle ear cavities are clear.  The visualized osseous structures and overlying soft tissues of the  skull and face are intact.        Impression:         1. Moderate cerebral and cerebellar volume loss with chronic  microvascular disease but no evidence of acute intracranial process.        This report was finalized on 12/01/2020 19:52 by Dr. Clyde Griffin MD.    XR Chest 1 View [[id number]] Collected: 12/01/20 1847     Updated: 12/01/20 1851    Narrative:      EXAMINATION: Chest 1 view 12/1/2020     HISTORY: Weakness     FINDINGS: Today's exam is compared to previous study of 11/8/2020. Lungs  are hypoventilated with mild basilar atelectasis. Lungs are otherwise  clear. There is no effusion or free air present. There is mild  cardiomegaly with an ectatic thoracic aorta.       Impression:      . Expiratory chest with mild basilar atelectasis.  This report was finalized on 12/01/2020 18:48 by Dr. Clyde Griffin MD.                        Assessment:    Condition: In stable condition.  Improving.   (      Acute cystitis present on admission-E. coli on culture no sensitivities available yet.  Continue IV antibiotics 1 more day switch to oral antibiotics Friday and discharge back to assisted care living.  Will notify  of plan.    Hypertension-blood pressure stable.    History of pulmonary embolus-continue anticoagulation.    Patient making slow steady progress.  Will plan to continue 1 more day of IV antibiotics.  Change to oral in a.m. and discharge to assisted care living.    Hypokalemia-potassium 3.2.  Replace orally and recheck in a.m.).     Problem List:     Alzheimer's dementia, late onset, with behavioral disturbance (CMS/HCC)    Essential hypertension    Acute on chronic systolic congestive heart failure (CMS/HCC)    Chronic diastolic CHF (congestive heart failure) (CMS/HCC)    Altered mental status    Acute cystitis with hematuria    Arthur Fine MD  12/3/2020

## 2020-12-04 NOTE — DISCHARGE SUMMARY
Hospital Discharge Summary    Nichole Mcgregor  :  3/31/1933  MRN:  [id number]    Admit date:  2020  Discharge date:  2020    Admitting Physician:    Arthur Fine MD    Discharge Diagnoses:      Altered mental status    Alzheimer's dementia, late onset, with behavioral disturbance (CMS/HCC)    Essential hypertension    Acute on chronic systolic congestive heart failure (CMS/HCC)    Chronic diastolic CHF (congestive heart failure) (CMS/HCC)    Acute cystitis with hematuria      Hospital Course:   The patient was admitted for the above surgical/medical indication.  Please see admission H&P for further details concerning the admission.  The patient was seen daily and progress noted via daily updates in the progress notes.  The patient improved throughout her stay.  They reached maximum medical improvement and were considered stable for discharge home.  They understand the importance of follow-up concerning any abnormal lab values/x-rays.  All questions were answered to the best of my ability prior to their discharge home.      Pleasantly confused 87-year-old white female who presents once again from her assisted care living facility with mental status changes related to an acute urinary tract infection that is present on admission.  Patient currently lives at the [facility] and was in her usual state of mental status and ate lunch went to go lay down and when they woke her up for dinner she was confused and not acting herself.  An ambulance was summoned and she was brought to the emergency room where work-up was unremarkable with the exception of a positive urinalysis.  Patient was admitted started on IV antibiotics IV fluids.      Patient's urine culture grew out E. coli and in speaking with microbiology this morning it is sensitive to all therefore her Rocephin will be changed to Omnicef 300 mg twice daily x7 days and she will be discharged back to the assisted care living.    In the future to  prevent readmission would recommend checking urinalysis at the [facility] facility and calling the office for antibiotics prior to ambulance being summons.      Discharge Medications:         Discharge Medications      New Medications      Instructions Start Date   cefdinir 300 MG capsule  Commonly known as: OMNICEF   300 mg, Oral, Every 12 Hours Scheduled         Changes to Medications      Instructions Start Date   ergocalciferol 1.25 MG (20250 UT) capsule  Commonly known as: ERGOCALCIFEROL  What changed: additional instructions   50,000 Units, Oral, 2 Times Weekly      rivaroxaban 20 MG tablet  Commonly known as: XARELTO  What changed: Another medication with the same name was removed. Continue taking this medication, and follow the directions you see here.   20 mg, Oral, Daily With Dinner         Continue These Medications      Instructions Start Date   aspirin 81 MG EC tablet   81 mg, Oral, Daily      benazepril 40 MG tablet  Commonly known as: LOTENSIN   40 mg, Oral, Daily      diphenhydrAMINE-acetaminophen  MG tablet per tablet  Commonly known as: TYLENOL PM   1 tablet, Oral, Nightly PRN      escitalopram 20 MG tablet  Commonly known as: LEXAPRO   20 mg, Oral, Daily      furosemide 20 MG tablet  Commonly known as: LASIX   20 mg, Oral, Daily      hydroCHLOROthiazide 12.5 MG tablet  Commonly known as: HYDRODIURIL   12.5 mg, Oral, Daily      memantine 28 MG capsule sustained-release 24 hr extended release capsule  Commonly known as: NAMENDA XR   28 mg, Oral, Daily      metoprolol succinate XL 25 MG 24 hr tablet  Commonly known as: TOPROL-XL   25 mg, Oral, Daily         Stop These Medications    allopurinol 100 MG tablet  Commonly known as: ZYLOPRIM            Consults: None    Significant Diagnostic Studies:      EKG: normal EKG, normal sinus rhythm, unchanged from previous tracings.      Treatments:   IV antibiotics IV fluids    Disposition:   To the [facility]  Follow up with  Arthur Fine MD within the week with hospital follow-up via telemed with LEO Krishnan which is a requirement by Medicare.    Signed:  Arthur Fine MD   12/4/2020, 09:02 CST

## 2020-12-04 NOTE — PROGRESS NOTES
Continued Stay Note   [facility]     Patient Name: Nichole Mcgregor  MRN: [id number]  Today's Date: 12/4/2020    Admit Date: 12/1/2020    Discharge Plan     Row Name 12/04/20 1003       Plan    Final Discharge Disposition Code  01 - home or self-care    Final Note  Pt is being dcd back to the [facility] today. Notified admissions at the [facility] of pt dc. Faxed DC summary and negative Covid result to [phone]. Pt transportation will be arranged thru the [facility] and they will be here at 2pm. RN informed.        Discharge Codes    No documentation.       Expected Discharge Date and Time     Expected Discharge Date Expected Discharge Time    Dec 4, 2020             JUANCARLOS Monique

## 2020-12-04 NOTE — PLAN OF CARE
Goal Outcome Evaluation:  Plan of Care Reviewed With: patient  Progress: no change  Outcome Summary: Pleasant and cooperative.  Oriented to self only.  Follows commands.  Was seeing a family with a bunch of food in her room.  Converses and laughs with the non existant family.  IV fluids and abx given as ordered.  Right hand a little swollen, probably from iv fluids.  Attempting to keep arm elevated.  Incontinent of urine.  Q2 turn.

## 2020-12-04 NOTE — PLAN OF CARE
Problem: Adult Inpatient Plan of Care  Goal: Patient-Specific Goal (Individualized)  Outcome: Ongoing, Progressing  Flowsheets (Taken 12/3/2020 8946)  Patient-Specific Goals (Include Timeframe): Mental status improvement before d/c   Goal Outcome Evaluation:  Plan of Care Reviewed With: patient  Progress: no change  Outcome Summary: A&OXperson and sometimes place. Denies pain. Cooperative this shift. VSS. PO abx given. IVF infused majority of shift. Incontinent. Turned Q2 as patient allowed. SCDS for VTE. D/c back to the [facility].

## 2020-12-05 NOTE — OUTREACH NOTE
Prep Survey      Responses   [facility] facility patient discharged from?  [place]   Is LACE score < 7 ?  No   Eligibility  Readm Mgmt   Discharge diagnosis  Altered mental status, UTI   Does the patient have one of the following disease processes/diagnoses(primary or secondary)?  Other   Does the patient have Home health ordered?  No   Is there a DME ordered?  No   Comments regarding appointments  Per AVS   Medication alerts for this patient  continue aspirin, continue xarelto with changes   Prep survey completed?  Yes          Tete Chacon RN

## 2020-12-08 NOTE — OUTREACH NOTE
Medical Week 2 Survey      Responses   [facility] patient discharged from?  [place]   Does the patient have one of the following disease processes/diagnoses(primary or secondary)?  Other   Call start time  1450   Revoke  Decline to participate [Daughter says she is in living facility, no way to call her, they take care of her there and will call us if needed. ]   Call end time  1452          Pat Cordova, RN

## 2021-01-01 ENCOUNTER — OUTSIDE FACILITY SERVICE (OUTPATIENT)
Dept: PODIATRY | Facility: CLINIC | Age: 86
End: 2021-01-01

## 2021-01-01 ENCOUNTER — APPOINTMENT (OUTPATIENT)
Dept: GENERAL RADIOLOGY | Facility: HOSPITAL | Age: 86
End: 2021-01-01

## 2021-01-01 ENCOUNTER — HOSPITAL ENCOUNTER (OUTPATIENT)
Facility: HOSPITAL | Age: 86
Setting detail: OBSERVATION
Discharge: HOME | End: 2021-06-01
Attending: EMERGENCY MEDICINE | Admitting: FAMILY MEDICINE

## 2021-01-01 ENCOUNTER — READMISSION MANAGEMENT (OUTPATIENT)
Dept: CALL CENTER | Facility: HOSPITAL | Age: 86
End: 2021-01-01

## 2021-01-01 ENCOUNTER — APPOINTMENT (OUTPATIENT)
Dept: CT IMAGING | Facility: HOSPITAL | Age: 86
End: 2021-01-01

## 2021-01-01 VITALS
DIASTOLIC BLOOD PRESSURE: 56 MMHG | SYSTOLIC BLOOD PRESSURE: 140 MMHG | WEIGHT: 180 LBS | HEART RATE: 59 BPM | OXYGEN SATURATION: 97 % | HEIGHT: 65 IN | BODY MASS INDEX: 29.99 KG/M2 | RESPIRATION RATE: 16 BRPM | TEMPERATURE: 97.7 F

## 2021-01-01 DIAGNOSIS — N17.9 ACUTE RENAL FAILURE, UNSPECIFIED ACUTE RENAL FAILURE TYPE (HCC): Primary | ICD-10-CM

## 2021-01-01 DIAGNOSIS — R55 SYNCOPE, UNSPECIFIED SYNCOPE TYPE: ICD-10-CM

## 2021-01-01 LAB
ALBUMIN SERPL-MCNC: 3.5 G/DL (ref 3.5–5.2)
ALBUMIN/GLOB SERPL: 1.5 G/DL
ALP SERPL-CCNC: 76 U/L (ref 39–117)
ALT SERPL W P-5'-P-CCNC: 7 U/L (ref 1–33)
AMORPH URATE CRY URNS QL MICRO: ABNORMAL /HPF
ANION GAP SERPL CALCULATED.3IONS-SCNC: 10 MMOL/L (ref 5–15)
ANION GAP SERPL CALCULATED.3IONS-SCNC: 5 MMOL/L (ref 5–15)
ANION GAP SERPL CALCULATED.3IONS-SCNC: 9 MMOL/L (ref 5–15)
AST SERPL-CCNC: 14 U/L (ref 1–32)
BACTERIA SPEC AEROBE CULT: ABNORMAL
BACTERIA UR QL AUTO: ABNORMAL /HPF
BASOPHILS # BLD AUTO: 0.08 10*3/MM3 (ref 0–0.2)
BASOPHILS NFR BLD AUTO: 0.9 % (ref 0–1.5)
BILIRUB SERPL-MCNC: 0.2 MG/DL (ref 0–1.2)
BILIRUB UR QL STRIP: NEGATIVE
BUN SERPL-MCNC: 29 MG/DL (ref 8–23)
BUN SERPL-MCNC: 44 MG/DL (ref 8–23)
BUN SERPL-MCNC: 44 MG/DL (ref 8–23)
BUN/CREAT SERPL: 17.7 (ref 7–25)
BUN/CREAT SERPL: 17.7 (ref 7–25)
BUN/CREAT SERPL: 17.9 (ref 7–25)
CALCIUM SPEC-SCNC: 10.1 MG/DL (ref 8.6–10.5)
CALCIUM SPEC-SCNC: 10.3 MG/DL (ref 8.6–10.5)
CALCIUM SPEC-SCNC: 9.6 MG/DL (ref 8.6–10.5)
CHLORIDE SERPL-SCNC: 107 MMOL/L (ref 98–107)
CHLORIDE SERPL-SCNC: 108 MMOL/L (ref 98–107)
CHLORIDE SERPL-SCNC: 112 MMOL/L (ref 98–107)
CLARITY UR: ABNORMAL
CO2 SERPL-SCNC: 27 MMOL/L (ref 22–29)
CO2 SERPL-SCNC: 28 MMOL/L (ref 22–29)
CO2 SERPL-SCNC: 29 MMOL/L (ref 22–29)
COLOR UR: YELLOW
CREAT SERPL-MCNC: 1.64 MG/DL (ref 0.57–1)
CREAT SERPL-MCNC: 2.46 MG/DL (ref 0.57–1)
CREAT SERPL-MCNC: 2.48 MG/DL (ref 0.57–1)
DEPRECATED RDW RBC AUTO: 47.9 FL (ref 37–54)
EOSINOPHIL # BLD AUTO: 0.39 10*3/MM3 (ref 0–0.4)
EOSINOPHIL NFR BLD AUTO: 4.4 % (ref 0.3–6.2)
ERYTHROCYTE [DISTWIDTH] IN BLOOD BY AUTOMATED COUNT: 13.6 % (ref 12.3–15.4)
GFR SERPL CREATININE-BSD FRML MDRD: 18 ML/MIN/1.73
GFR SERPL CREATININE-BSD FRML MDRD: 19 ML/MIN/1.73
GFR SERPL CREATININE-BSD FRML MDRD: 30 ML/MIN/1.73
GLOBULIN UR ELPH-MCNC: 2.3 GM/DL
GLUCOSE SERPL-MCNC: 105 MG/DL (ref 65–99)
GLUCOSE SERPL-MCNC: 116 MG/DL (ref 65–99)
GLUCOSE SERPL-MCNC: 92 MG/DL (ref 65–99)
GLUCOSE UR STRIP-MCNC: NEGATIVE MG/DL
HCT VFR BLD AUTO: 36.5 % (ref 34–46.6)
HGB BLD-MCNC: 11.6 G/DL (ref 12–15.9)
HGB UR QL STRIP.AUTO: NEGATIVE
HOLD SPECIMEN: NORMAL
HOLD SPECIMEN: NORMAL
HYALINE CASTS UR QL AUTO: ABNORMAL /LPF
IMM GRANULOCYTES # BLD AUTO: 0.03 10*3/MM3 (ref 0–0.05)
IMM GRANULOCYTES NFR BLD AUTO: 0.3 % (ref 0–0.5)
INR PPP: 1.12 (ref 0.91–1.09)
KETONES UR QL STRIP: NEGATIVE
LEUKOCYTE ESTERASE UR QL STRIP.AUTO: ABNORMAL
LYMPHOCYTES # BLD AUTO: 2.12 10*3/MM3 (ref 0.7–3.1)
LYMPHOCYTES NFR BLD AUTO: 24 % (ref 19.6–45.3)
MAGNESIUM SERPL-MCNC: 2 MG/DL (ref 1.6–2.4)
MCH RBC QN AUTO: 30.9 PG (ref 26.6–33)
MCHC RBC AUTO-ENTMCNC: 31.8 G/DL (ref 31.5–35.7)
MCV RBC AUTO: 97.1 FL (ref 79–97)
MONOCYTES # BLD AUTO: 0.42 10*3/MM3 (ref 0.1–0.9)
MONOCYTES NFR BLD AUTO: 4.8 % (ref 5–12)
NEUTROPHILS NFR BLD AUTO: 5.8 10*3/MM3 (ref 1.7–7)
NEUTROPHILS NFR BLD AUTO: 65.6 % (ref 42.7–76)
NITRITE UR QL STRIP: NEGATIVE
NRBC BLD AUTO-RTO: 0 /100 WBC (ref 0–0.2)
PH UR STRIP.AUTO: 5.5 [PH] (ref 5–8)
PLATELET # BLD AUTO: 207 10*3/MM3 (ref 140–450)
PMV BLD AUTO: 10.6 FL (ref 6–12)
POTASSIUM SERPL-SCNC: 3.7 MMOL/L (ref 3.5–5.2)
POTASSIUM SERPL-SCNC: 3.8 MMOL/L (ref 3.5–5.2)
POTASSIUM SERPL-SCNC: 3.8 MMOL/L (ref 3.5–5.2)
PROT SERPL-MCNC: 5.8 G/DL (ref 6–8.5)
PROT UR QL STRIP: NEGATIVE
PROTHROMBIN TIME: 13.5 SECONDS (ref 11.5–13.4)
QT INTERVAL: 466 MS
QT INTERVAL: 508 MS
QTC INTERVAL: 453 MS
QTC INTERVAL: 463 MS
RBC # BLD AUTO: 3.76 10*6/MM3 (ref 3.77–5.28)
RBC # UR: ABNORMAL /HPF
REF LAB TEST METHOD: ABNORMAL
SARS-COV-2 RNA PNL SPEC NAA+PROBE: NOT DETECTED
SODIUM SERPL-SCNC: 145 MMOL/L (ref 136–145)
SP GR UR STRIP: 1.01 (ref 1–1.03)
SQUAMOUS #/AREA URNS HPF: ABNORMAL /HPF
TROPONIN T SERPL-MCNC: 0.01 NG/ML (ref 0–0.03)
TROPONIN T SERPL-MCNC: <0.01 NG/ML (ref 0–0.03)
UROBILINOGEN UR QL STRIP: ABNORMAL
WBC # BLD AUTO: 8.84 10*3/MM3 (ref 3.4–10.8)
WBC CLUMPS # UR AUTO: ABNORMAL /HPF
WBC UR QL AUTO: ABNORMAL /HPF
WHOLE BLOOD HOLD SPECIMEN: NORMAL
WHOLE BLOOD HOLD SPECIMEN: NORMAL

## 2021-01-01 PROCEDURE — 99285 EMERGENCY DEPT VISIT HI MDM: CPT

## 2021-01-01 PROCEDURE — 70450 CT HEAD/BRAIN W/O DYE: CPT

## 2021-01-01 PROCEDURE — 96361 HYDRATE IV INFUSION ADD-ON: CPT

## 2021-01-01 PROCEDURE — G0378 HOSPITAL OBSERVATION PER HR: HCPCS

## 2021-01-01 PROCEDURE — 84484 ASSAY OF TROPONIN QUANT: CPT | Performed by: EMERGENCY MEDICINE

## 2021-01-01 PROCEDURE — 87635 SARS-COV-2 COVID-19 AMP PRB: CPT | Performed by: EMERGENCY MEDICINE

## 2021-01-01 PROCEDURE — C9803 HOPD COVID-19 SPEC COLLECT: HCPCS

## 2021-01-01 PROCEDURE — 80053 COMPREHEN METABOLIC PANEL: CPT

## 2021-01-01 PROCEDURE — 93010 ELECTROCARDIOGRAM REPORT: CPT | Performed by: INTERNAL MEDICINE

## 2021-01-01 PROCEDURE — 11721 DEBRIDE NAIL 6 OR MORE: CPT | Performed by: NURSE PRACTITIONER

## 2021-01-01 PROCEDURE — 85610 PROTHROMBIN TIME: CPT

## 2021-01-01 PROCEDURE — 93005 ELECTROCARDIOGRAM TRACING: CPT | Performed by: EMERGENCY MEDICINE

## 2021-01-01 PROCEDURE — 83735 ASSAY OF MAGNESIUM: CPT

## 2021-01-01 PROCEDURE — 36415 COLL VENOUS BLD VENIPUNCTURE: CPT | Performed by: PHYSICIAN ASSISTANT

## 2021-01-01 PROCEDURE — 84484 ASSAY OF TROPONIN QUANT: CPT

## 2021-01-01 PROCEDURE — 25010000002 LORAZEPAM PER 2 MG: Performed by: FAMILY MEDICINE

## 2021-01-01 PROCEDURE — 81001 URINALYSIS AUTO W/SCOPE: CPT | Performed by: EMERGENCY MEDICINE

## 2021-01-01 PROCEDURE — 87086 URINE CULTURE/COLONY COUNT: CPT | Performed by: EMERGENCY MEDICINE

## 2021-01-01 PROCEDURE — 71045 X-RAY EXAM CHEST 1 VIEW: CPT

## 2021-01-01 PROCEDURE — 96376 TX/PRO/DX INJ SAME DRUG ADON: CPT

## 2021-01-01 PROCEDURE — 93005 ELECTROCARDIOGRAM TRACING: CPT

## 2021-01-01 PROCEDURE — 25010000002 CEFTRIAXONE PER 250 MG: Performed by: EMERGENCY MEDICINE

## 2021-01-01 PROCEDURE — 96375 TX/PRO/DX INJ NEW DRUG ADDON: CPT

## 2021-01-01 PROCEDURE — 99220 PR INITIAL OBSERVATION CARE/DAY 70 MINUTES: CPT | Performed by: CLINICAL NURSE SPECIALIST

## 2021-01-01 PROCEDURE — 96365 THER/PROPH/DIAG IV INF INIT: CPT

## 2021-01-01 PROCEDURE — 80048 BASIC METABOLIC PNL TOTAL CA: CPT | Performed by: PHYSICIAN ASSISTANT

## 2021-01-01 PROCEDURE — 85025 COMPLETE CBC W/AUTO DIFF WBC: CPT

## 2021-01-01 RX ORDER — LORAZEPAM 2 MG/ML
1 INJECTION INTRAMUSCULAR EVERY 4 HOURS PRN
Status: DISCONTINUED | OUTPATIENT
Start: 2021-01-01 | End: 2021-01-01 | Stop reason: HOSPADM

## 2021-01-01 RX ORDER — BENAZEPRIL HYDROCHLORIDE 5 MG/1
10 TABLET, FILM COATED ORAL DAILY
Qty: 90 TABLET | Refills: 2 | Status: SHIPPED | OUTPATIENT
Start: 2021-01-01

## 2021-01-01 RX ORDER — SODIUM CHLORIDE 9 MG/ML
125 INJECTION, SOLUTION INTRAVENOUS CONTINUOUS
Status: DISCONTINUED | OUTPATIENT
Start: 2021-01-01 | End: 2021-01-01

## 2021-01-01 RX ORDER — CEFDINIR 300 MG/1
300 CAPSULE ORAL
Qty: 7 CAPSULE | Refills: 0 | Status: SHIPPED | OUTPATIENT
Start: 2021-01-01 | End: 2021-01-01

## 2021-01-01 RX ORDER — CEFDINIR 300 MG/1
300 CAPSULE ORAL
Status: DISCONTINUED | OUTPATIENT
Start: 2021-01-01 | End: 2021-01-01 | Stop reason: HOSPADM

## 2021-01-01 RX ORDER — SODIUM CHLORIDE 0.9 % (FLUSH) 0.9 %
10 SYRINGE (ML) INJECTION AS NEEDED
Status: DISCONTINUED | OUTPATIENT
Start: 2021-01-01 | End: 2021-01-01 | Stop reason: HOSPADM

## 2021-01-01 RX ADMIN — LORAZEPAM 1 MG: 2 INJECTION INTRAMUSCULAR; INTRAVENOUS at 10:02

## 2021-01-01 RX ADMIN — SODIUM CHLORIDE 125 ML/HR: 9 INJECTION, SOLUTION INTRAVENOUS at 10:07

## 2021-01-01 RX ADMIN — SODIUM CHLORIDE 125 ML/HR: 9 INJECTION, SOLUTION INTRAVENOUS at 18:54

## 2021-01-01 RX ADMIN — SODIUM CHLORIDE 125 ML/HR: 9 INJECTION, SOLUTION INTRAVENOUS at 02:28

## 2021-01-01 RX ADMIN — SODIUM CHLORIDE 1 G: 900 INJECTION INTRAVENOUS at 07:40

## 2021-01-01 RX ADMIN — SODIUM CHLORIDE, POTASSIUM CHLORIDE, SODIUM LACTATE AND CALCIUM CHLORIDE 500 ML: 600; 310; 30; 20 INJECTION, SOLUTION INTRAVENOUS at 07:04

## 2021-01-01 RX ADMIN — LORAZEPAM 1 MG: 2 INJECTION INTRAMUSCULAR; INTRAVENOUS at 14:46

## 2021-01-01 RX ADMIN — CEFDINIR 300 MG: 300 CAPSULE ORAL at 10:32

## 2021-05-31 PROBLEM — D63.8 ANEMIA, CHRONIC DISEASE: Status: ACTIVE | Noted: 2021-01-01

## 2021-05-31 PROBLEM — T83.511A UTI (URINARY TRACT INFECTION) DUE TO URINARY INDWELLING CATHETER (HCC): Status: ACTIVE | Noted: 2021-01-01

## 2021-05-31 PROBLEM — N17.9 ACUTE RENAL FAILURE (HCC): Status: ACTIVE | Noted: 2021-01-01

## 2021-05-31 PROBLEM — R00.1 BRADYCARDIA: Status: ACTIVE | Noted: 2021-01-01

## 2021-05-31 PROBLEM — N39.0 UTI (URINARY TRACT INFECTION) DUE TO URINARY INDWELLING CATHETER (HCC): Status: ACTIVE | Noted: 2021-01-01

## 2021-06-01 PROBLEM — T83.511A UTI (URINARY TRACT INFECTION) DUE TO URINARY INDWELLING CATHETER (HCC): Status: RESOLVED | Noted: 2021-01-01 | Resolved: 2021-01-01

## 2021-06-01 PROBLEM — N39.0 UTI (URINARY TRACT INFECTION) DUE TO URINARY INDWELLING CATHETER (HCC): Status: RESOLVED | Noted: 2021-01-01 | Resolved: 2021-01-01

## 2021-06-01 NOTE — CONSULTS
"Palliative Care Initial Consult   Attending Physician: Arthur Fine MD  Referring Provider: Arthur Fine MD    Reason for Referral: assistance with clarification of goals of care  Family/Support: Daughter/POA, Carla Cassidy    Code Status and Medical Interventions:   Ordered at: 21 0806     Level Of Support Discussed With:    Health Care Surrogate     Code Status:    No CPR     Medical Interventions (Level of Support Prior to Arrest):    Comfort Measures     Goals of Care: TBD.    HPI:   88 y.o. female with past medical history significant for Alzheimer's dementia, hypertension, diastolic dysfunction, anemia of chronic disease, and bradycardia. Resident of the [facility] assisted living [facility]. Patient presented to [facility] on 2021 related to syncope.  She was found to have acute kidney injury and provided IV fluid hydration with improvement of kidney function.  Symptoms due to poor oral intake.  She developed worsening confusion and agitation requiring Ativan.  Dr. Fine spoke with patient's daughter who has elected only comfort care measures.  The discharge summary has been placed with plan is to discharge back to the [facility].  We are asked to assist with \"paperwork\". No family is present.   Review of Systems   Unable to perform ROS: dementia     1- Pain Assessment  PAINAD Breathin-->normal  PAINAD Negative Vocalization: 0-->none  PAINAD Facial Expression: 0-->smiling or inexpressive  PAINAD Body Language: 0-->relaxed  PAINAD Consolability: 0-->no need to console  PAINAD Score: 0    Past Medical History:   Diagnosis Date   • Dementia (CMS/HCC)    • Hypertension      Past Surgical History:   Procedure Laterality Date   • HYSTERECTOMY     • KNEE MENISCAL REPAIR       Social History     Socioeconomic History   • Marital status:      Spouse name: Not on file   • Number of children: Not on file   • Years of education: Not on file   • Highest education level: Not on " "file   Tobacco Use   • Smoking status: Never Smoker   • Smokeless tobacco: Never Used   Substance and Sexual Activity   • Alcohol use: Never   • Drug use: Never   • Sexual activity: Defer       Current Facility-Administered Medications   Medication Dose Route Frequency Provider Last Rate Last Admin   • cefdinir (OMNICEF) capsule 300 mg  300 mg Oral Q24H Arthur Fine MD       • LORazepam (ATIVAN) injection 1 mg  1 mg Intravenous Q4H PRN Arthur Fine MD   1 mg at 05/31/21 1446   • sodium chloride 0.9 % flush 10 mL  10 mL Intravenous PRN Emergency, Triage Protocol, MD            LORazepam  •  sodium chloride    Allergies   Allergen Reactions   • Sulfa Antibiotics Unknown - High Severity     Current medication reviewed for route, type, dose and frequency and are current per MAR at time of dictation.      Intake/Output Summary (Last 24 hours) at 6/1/2021 0930  Last data filed at 6/1/2021 0408  Gross per 24 hour   Intake --   Output 900 ml   Net -900 ml       Physical Exam:    Diagnostics: Reviewed  /56 (BP Location: Left arm, Patient Position: Lying)   Pulse 59   Temp 97.7 °F (36.5 °C) (Oral)   Resp 16   Ht 165.1 cm (65\")   Wt 81.6 kg (180 lb)   SpO2 97%   Breastfeeding No   BMI 29.95 kg/m²     Vitals and nursing note reviewed.   Constitutional:       Appearance: Ill-appearing and chronically ill-appearing.   Eyes:      General: Lids are normal.   HENT:      Head: Normocephalic.   Pulmonary:      Effort: Pulmonary effort is normal.   Cardiovascular:      Bradycardia present.   Edema:     Peripheral edema absent.   Abdominal:      General: Bowel sounds are normal.   Musculoskeletal:      Cervical back: Neck supple. Skin:     General: Skin is warm and dry.   Genitourinary:     Comments: Incontinence  Neurological:      Mental Status: Alert. Confused.   Psychiatric:         Cognition and Memory: Cognition is impaired.       Patient status: Disease state: Controlled with current " treatments.  Functional status: Palliative Performance Scale Score: Performance 40% based on the following measures: Ambulation: Mainly in bed, Activity and Evidence of Disease: Unable to do any work, extensive evidence of disease, Self-Care: Mainly assistance required,  Intake: Normal or reduced, LOC: Full, drowsy or confusion   Nutritional status: Albumin 3.50. Body mass index is 29.95 kg/m².         Family support: The patient receives support from her daughter..  Advance Directives: Advance Directive Status: Patient has advance directive, copy requested   POA/Healthcare surrogate-Carla Cassidy, daughter-POA.  A copy of POA paperwork has been obtained and scanned to EMR.    Impression/Problem List:    1.  Alzheimer's dementia-likely mixed with vascular dementia underlying chronic small vessel ischemic changes per CT the head  2.  Acute kidney injury  3.  Hypertension  4.  Anemia of chronic disease  5.  Diastolic dysfunction   6.  Advanced age  7.  Incontinence    Recommendations/Plan:  1. plan: Goals of care include CODE STATUS no CPR/comfort measures per attending.  2.  Palliative care encounter  -Call placed to daughter/POA without answer, left voicemail, awaiting call back.  -A discharge order has been placed by attending this morning with plans per attending note to discharge back to the [facility].  No family is currently present.  Will plan to discuss transitions to hospice and discharge plan options. Will assist with a MOST form is daughter and attending physician able to complete.    ADDENDUM-@8855 spoke with Carla Cassidy, daughter/POA via telephone.  We discussed patient's overall decline over the last couple weeks and concerns with regard to progressive dementia and falls.  We did discuss concerns this hospitalization due to patient's dehydration, poor oral intake, and high risk for falls. Daughter does not necessarily feel that patient has reached a juncture of failure to thrive.  She was taken aback  with regard to  conversation of transitions to hospice initially.  We discussed discharge options to include back to the [facility] with hospice and 24/7 caregivers if needed versus nursing facility placement with palliative care/hospice.  She is aware of possibility of private pay options.  We discussed current medical priorities are no CPR and comfort measures.  Daughter states that she plans to discuss care goals forward with her siblings this afternoon, but at this juncture she is leaning toward IV fluids for hydration and IV antibiotics as needed.  We discussed completion of a MOST form document based upon this conversation.  A copy of a MOST form and nursing facility options has been emailed per daughter's request.  Again continuing family conference to further delineate care goals.    ADDENDUM-@2:20 PM again spoke with Carla Cassidy, daughter/POA via telephone who verbalizes she has had the opportunity to speak with her siblings and reviewed the MOST document.  Family would like to transition with de-escalation measures back to the [facility] assisted living facility with hospice services.  Family planning to meet with Bharti, liaison with the [facility] to set up sitters and plan for hospice services.  A hospice consult has been placed.  A MOST document has been reviewed and initiated and we will have attending complete.  Relayed plan to Dr. Fine electronically.      Thank you for this consult and allowing us to participate in patient's plan of care. Palliative Care Team will continue to follow patient.     Time spent: 99 minutes spent reviewing medical and medication records, assessing and examining patient, discussing with family, answering questions, providing some guidance about a plan and documentation of care, and coordinating care with other healthcare members, with > 50% time spent face to face.     Bijal Hansen, APRN  6/1/2021

## 2021-06-01 NOTE — DISCHARGE PLACEMENT REQUEST
"Navya Jensen [phone]  Nichole Green (88 y.o. Female)     Date of Birth Social Security Number Address Home Phone MRN    03/31/1933  [place] KARUNA ALDANA JOSEPHINE  [address] [facility] [address] [phone] [id number]    Taoist Marital Status          Bahai        Admission Date Admission Type Admitting Provider Attending Provider Department, Room/Bed    5/31/21 Emergency Arthur Fine MD Eickholz, James Noah, MD [facility] 3A, 353/1    Discharge Date Discharge Disposition Discharge Destination         Home or Self Care              Attending Provider: Arthur Fine MD    Allergies: Sulfa Antibiotics    Isolation: None   Infection: None   Code Status: No CPR    Ht: 165.1 cm (65\")   Wt: 81.6 kg (180 lb)    Admission Cmt: None   Principal Problem: Acute renal failure (CMS/HCC) [N17.9]                 Active Insurance as of 5/31/2021     Primary Coverage     Payor Plan Insurance Group Employer/Plan Group    HUMANA MEDICARE REPLACEMENT HUMANA MEDICARE REPLACEMENT [id number]     Payor Plan Address Payor Plan Phone Number Payor Plan Fax Number Effective Dates    [address] [address] [phone]  1/1/2018 - None Entered    [facility] [id number]       Subscriber Name Subscriber Birth Date Member ID       NICHOLE GREEN 3/31/1933 [id number]                 Emergency Contacts      (Rel.) Home Phone Work Phone Mobile Phone    JOSEPHINEKARUNA ALDANA (Power of ) [phone] -- --    YARA MARTINS (Daughter) [phone] -- --               History & Physical      Arthur Fine MD at 05/31/21 0808          History and Physical      CHIEF COMPLAINT:  syncope    Reason for Admission:  ARF    History Obtained From: patient, family and chart    HISTORY OF PRESENT ILLNESS:      The patient is a 88 y.o. female with significant past medical history of HTN who present to the ER to be evaluated following episode of syncope.  She has a history of dementia " "and lives at [facility] assisted living [facility]. She was on the second floor, her downstairs neighbor heard a \"thud\" and contacted the staff. She was found by staff lying on the ground.  They attempted to help her up and she had an episode of syncope.  EMS was called.  On scene patient was confused more than baseline.  EMS had a difficult time obtaining her blood pressure.  She was brought to the emergency department for evaluation.  Upon arrival to the ED, patient was awake and alert, pleasantly demented.  She has no complaints.  Cannot recall events leading up to her presentation to the emergency department.  Family states she is at baseline.  She has been admitted for IVF hydration as her Cr was elevated at 2.48, her baseline creatine is 1.2. Labs, CT head and cxr noted.    Past Medical History:    Past Medical History:   Diagnosis Date   • Dementia (CMS/HCC)    • Hypertension        Past Surgical History:    Past Surgical History:   Procedure Laterality Date   • HYSTERECTOMY     • KNEE MENISCAL REPAIR         Medications Prior to Admission:    (Not in a hospital admission)      Allergies:  Sulfa antibiotics    Social History:   Social History     Socioeconomic History   • Marital status:      Spouse name: Not on file   • Number of children: Not on file   • Years of education: Not on file   • Highest education level: Not on file   Tobacco Use   • Smoking status: Never Smoker   • Smokeless tobacco: Never Used   Substance and Sexual Activity   • Alcohol use: Never   • Drug use: Never   • Sexual activity: Defer       Family History:   History reviewed. No pertinent family history.    REVIEW OF SYSTEMS:    CONSTITUTIONAL:  negative for fevers  EYES:  negative  HEENT:   negative  RESPIRATORY:  negative for cough and dyspnea on exertion  CARDIOVASCULAR: negative for chest pain  GASTROINTESTINAL:  negative for diarrhea, melena and rectal bleeding  MUSCULOSKELETAL:  negative for myalgias  NEUROLOGICAL:   " "positive for weakness  INTEGUMENT: negative for rash     Vitals:  /57   Pulse 61   Temp 97.7 °F (36.5 °C) (Oral)   Resp 20   Ht 165.1 cm (65\")   Wt 81.6 kg (180 lb)   SpO2 99%   Breastfeeding No   BMI 29.95 kg/m²       PHYSICAL EXAM:     General: She is not in acute distress.     Appearance: Normal appearance. She is normal weight.   HENT:      Head: Normocephalic and atraumatic.      Nose: Nose normal.      Mouth/Throat:      Mouth: Mucous membranes are moist.      Pharynx: Oropharynx is clear. No oropharyngeal exudate or posterior oropharyngeal erythema.   Eyes:      Extraocular Movements: Extraocular movements intact.      Conjunctiva/sclera: Conjunctivae normal.      Pupils: Pupils are equal, round, and reactive to light.   Cardiovascular:      Rate and Rhythm: Regular rhythm. Bradycardia present.      Pulses: Normal pulses.      Heart sounds: Normal heart sounds.   Pulmonary:      Effort: Pulmonary effort is normal.      Breath sounds: Normal breath sounds. No wheezing, rhonchi or rales.   Abdominal:      General: Abdomen is flat. Bowel sounds are normal. There is no distension.      Palpations: Abdomen is soft.      Tenderness: There is no abdominal tenderness.   Musculoskeletal:         General: No tenderness. Normal range of motion.      Cervical back: Normal range of motion and neck supple. No rigidity. No muscular tenderness.      Right lower leg: No edema.      Left lower leg: No edema.   Skin:     General: Skin is warm and dry.      Capillary Refill: Capillary refill takes less than 2 seconds.      Findings: No rash.   Neurological:      General: No focal deficit present.      Mental Status: She is alert. Mental status is at baseline. She is disoriented.      Cranial Nerves: No cranial nerve deficit.      Sensory: No sensory deficit.      Motor: No weakness.      Comments: Disoriented but at baseline   Psychiatric:         Mood and Affect: Mood normal.         Behavior: Behavior normal.    "      Thought Content: Thought content normal.         DATA:  Lab Results (last 24 hours)     Procedure Component Value Units Date/Time    COVID PRE-OP / PRE-PROCEDURE SCREENING ORDER (NO ISOLATION) - Swab, Nasal Cavity [[id number]]  (Normal) Collected: 05/31/21 0656    Specimen: Swab from Nasal Cavity Updated: 05/31/21 0740    Narrative:      The following orders were created for panel order COVID PRE-OP / PRE-PROCEDURE SCREENING ORDER (NO ISOLATION) - Swab, Nasal Cavity.  Procedure                               Abnormality         Status                     ---------                               -----------         ------                     COVID-19,[employer] IN-MARKO...[[id number]]  Normal              Final result                 Please view results for these tests on the individual orders.    COVID-19,[employer] IN-HOUSE,Nasal Swab No Transport Media 3-4 HR TAT - Swab, Nasal Cavity [[id number]]  (Normal) Collected: 05/31/21 0656    Specimen: Swab from Nasal Cavity Updated: 05/31/21 0740     COVID19 Not Detected    Narrative:      Fact sheet for providers: https://www.fda.gov/media/555047/download     Fact sheet for patients: https://www.fda.gov/media/846279/download    Test performed by PCR.    Consider negative results in combination with clinical observations, patient history, and epidemiological information.    Troponin [[id number]]  (Normal) Collected: 05/31/21 0702    Specimen: Blood Updated: 05/31/21 0726     Troponin T <0.010 ng/mL     Narrative:      Troponin T Reference Range:  <= 0.03 ng/mL-   Negative for AMI  >0.03 ng/mL-     Abnormal for myocardial necrosis.  Clinicians would have to utilize clinical acumen, EKG, Troponin and serial changes to determine if it is an Acute Myocardial Infarction or myocardial injury due to an underlying chronic condition.       Results may be falsely decreased if patient taking Biotin.      Urinalysis, Microscopic Only - Urine, Random Void [[id number]]  (Abnormal) Collected:  05/31/21 0521    Specimen: Urine, Random Void Updated: 05/31/21 0557     RBC, UA 0-2 /HPF      WBC, UA 21-30 /HPF      Bacteria, UA 1+ /HPF      Squamous Epithelial Cells, UA 7-12 /HPF      Hyaline Casts, UA 7-12 /LPF      Amorphous Crystals, UA Small/1+ /HPF      WBC Clumps, UA Small/1+ /HPF      Methodology Manual Light Microscopy    Urine Culture - Urine, Urine, Random Void [[id number]] Collected: 05/31/21 0521    Specimen: Urine, Random Void Updated: 05/31/21 0557    Urinalysis With Culture If Indicated - Urine, Random Void [[id number]]  (Abnormal) Collected: 05/31/21 0521    Specimen: Urine, Random Void Updated: 05/31/21 0540     Color, UA Yellow     Appearance, UA Cloudy     pH, UA 5.5     Specific Gravity, UA 1.015     Glucose, UA Negative     Ketones, UA Negative     Bilirubin, UA Negative     Blood, UA Negative     Protein, UA Negative     Leuk Esterase, UA Moderate (2+)     Nitrite, UA Negative     Urobilinogen, UA 1.0 E.U./dL    Jackson Draw [[id number]] Collected: 05/31/21 0412    Specimen: Blood Updated: 05/31/21 0515    Narrative:      The following orders were created for panel order Jackson Draw.  Procedure                               Abnormality         Status                     ---------                               -----------         ------                     Light Blue Top[[id number]]                                   Final result               Green Top (Gel)[[id number]]                                  Final result               Lavender Top[[id number]]                                     Final result               Red Top[[id number]]                                          Final result                 Please view results for these tests on the individual orders.    Lavender Top [[id number]] Collected: 05/31/21 0412    Specimen: Blood Updated: 05/31/21 0515     Extra Tube hold for add-on     Comment: Auto resulted       Light Blue Top [[id number]] Collected: 05/31/21 0412    Specimen: Blood  Updated: 05/31/21 0515     Extra Tube hold for add-on     Comment: Auto resulted       Red Top [[id number]] Collected: 05/31/21 0412    Specimen: Blood Updated: 05/31/21 0515     Extra Tube Hold for add-ons.     Comment: Auto resulted.       Green Top (Gel) [[id number]] Collected: 05/31/21 0412    Specimen: Blood Updated: 05/31/21 0515     Extra Tube Hold for add-ons.     Comment: Auto resulted.       Comprehensive Metabolic Panel [[id number]]  (Abnormal) Collected: 05/31/21 0412    Specimen: Blood Updated: 05/31/21 0437     Glucose 116 mg/dL      BUN 44 mg/dL      Creatinine 2.48 mg/dL      Sodium 145 mmol/L      Potassium 3.8 mmol/L      Chloride 107 mmol/L      CO2 29.0 mmol/L      Calcium 10.3 mg/dL      Total Protein 5.8 g/dL      Albumin 3.50 g/dL      ALT (SGPT) 7 U/L      AST (SGOT) 14 U/L      Alkaline Phosphatase 76 U/L      Total Bilirubin 0.2 mg/dL      eGFR Non African Amer 18 mL/min/1.73      Globulin 2.3 gm/dL      A/G Ratio 1.5 g/dL      BUN/Creatinine Ratio 17.7     Anion Gap 9.0 mmol/L     Narrative:      GFR Normal >60  Chronic Kidney Disease <60  Kidney Failure <15      Troponin [[id number]]  (Normal) Collected: 05/31/21 0412    Specimen: Blood Updated: 05/31/21 0435     Troponin T 0.012 ng/mL     Narrative:      Troponin T Reference Range:  <= 0.03 ng/mL-   Negative for AMI  >0.03 ng/mL-     Abnormal for myocardial necrosis.  Clinicians would have to utilize clinical acumen, EKG, Troponin and serial changes to determine if it is an Acute Myocardial Infarction or myocardial injury due to an underlying chronic condition.       Results may be falsely decreased if patient taking Biotin.      Magnesium [[id number]]  (Normal) Collected: 05/31/21 0412    Specimen: Blood Updated: 05/31/21 0432     Magnesium 2.0 mg/dL     Protime-INR [[id number]]  (Abnormal) Collected: 05/31/21 0412    Specimen: Blood Updated: 05/31/21 0427     Protime 13.5 Seconds      INR 1.12    CBC & Differential [[id number]]  (Abnormal)  Collected: 05/31/21 0412    Specimen: Blood Updated: 05/31/21 0420    Narrative:      The following orders were created for panel order CBC & Differential.  Procedure                               Abnormality         Status                     ---------                               -----------         ------                     CBC Auto Differential[[id number]]        Abnormal            Final result                 Please view results for these tests on the individual orders.    CBC Auto Differential [[id number]]  (Abnormal) Collected: 05/31/21 0412    Specimen: Blood Updated: 05/31/21 0420     WBC 8.84 10*3/mm3      RBC 3.76 10*6/mm3      Hemoglobin 11.6 g/dL      Hematocrit 36.5 %      MCV 97.1 fL      MCH 30.9 pg      MCHC 31.8 g/dL      RDW 13.6 %      RDW-SD 47.9 fl      MPV 10.6 fL      Platelets 207 10*3/mm3      Neutrophil % 65.6 %      Lymphocyte % 24.0 %      Monocyte % 4.8 %      Eosinophil % 4.4 %      Basophil % 0.9 %      Immature Grans % 0.3 %      Neutrophils, Absolute 5.80 10*3/mm3      Lymphocytes, Absolute 2.12 10*3/mm3      Monocytes, Absolute 0.42 10*3/mm3      Eosinophils, Absolute 0.39 10*3/mm3      Basophils, Absolute 0.08 10*3/mm3      Immature Grans, Absolute 0.03 10*3/mm3      nRBC 0.0 /100 WBC         Imaging Results (Last 24 Hours)     Procedure Component Value Units Date/Time    XR Chest 1 View [[id number]] Resulted: 05/31/21 0646     Updated: 05/31/21 0657    CT Head Without Contrast [[id number]] Resulted: 05/31/21 0444     Updated: 05/31/21 0453            ASSESSMENT AND PLAN:        Acute renal failure (CMS/HCC)    Alzheimer's dementia, late onset, with behavioral disturbance (CMS/HCC)    Essential hypertension    UTI (urinary tract infection) due to urinary indwelling catheter (CMS/HCC)    Anemia, chronic disease    Bradycardia    Admit medical floor  Fall precautions  Physical therapy eval  Follow daily renal labs  IVF hydration  IV rocephin for UTI  Cardiac moniroring      Ramon GARAY  MONICA Estrella  08:08 CDT 5/31/2021     Acute renal failure related to prerenal azotemia/infection-gentle rehydration with serial laboratory investigation along with antimicrobials.  Await urine culture.  Recheck labs in a.m.    I have discussed the care of Nichole Mcgregor, including pertinent history and exam findings with the ARNP/PA.  I have seen and examined the patient and the key elements of all parts of the encounter have been performed by me. I agree with the assessment and plan as outlined by the ARNP/PA. Please refer to my comments for complete documentation.     Electronically signed by Arthur Fine MD on 5/31/2021 at 09:10 CDT      Electronically signed by Arthur Fine MD at 05/31/21 0910         Current Facility-Administered Medications   Medication Dose Route Frequency Provider Last Rate Last Admin   • cefdinir (OMNICEF) capsule 300 mg  300 mg Oral Q24H Arthur Fine MD   300 mg at 06/01/21 1032   • LORazepam (ATIVAN) injection 1 mg  1 mg Intravenous Q4H PRN Arthur Fine MD   1 mg at 05/31/21 1446   • sodium chloride 0.9 % flush 10 mL  10 mL Intravenous PRN Emergency, Triage Protocol, MD         Operative/Procedure Notes (last 24 hours) (Notes from 05/31/21 1520 through 06/01/21 1520)    No notes of this type exist for this encounter.         Physician Progress Notes (last 24 hours) (Notes from 05/31/21 1520 through 06/01/21 1520)    No notes of this type exist for this encounter.            Consult Notes (last 24 hours) (Notes from 05/31/21 1520 through 06/01/21 1520)      Bijal Hansen APRN at 06/01/21 0930      Consult Orders    1. Inpatient Palliative Care Consult [[id number]] ordered by Arthur Fine MD at 06/01/21 0816               Palliative Care Initial Consult   Attending Physician: Arthur Fine MD  Referring Provider: Arthur Fine MD    Reason for Referral: assistance with clarification of goals of care  Family/Support:  "Daughter/POA, Carla Cassidy    Code Status and Medical Interventions:   Ordered at: 21 0806     Level Of Support Discussed With:    Health Care Surrogate     Code Status:    No CPR     Medical Interventions (Level of Support Prior to Arrest):    Comfort Measures     Goals of Care: TBD.    HPI:   88 y.o. female with past medical history significant for Alzheimer's dementia, hypertension, diastolic dysfunction, anemia of chronic disease, and bradycardia. Resident of the [facility] assisted living [facility]. Patient [facility] to [facility] on [facility] related to syncope.  She was found to have acute kidney injury and provided IV fluid hydration with improvement of kidney function.  Symptoms due to poor oral intake.  She developed worsening confusion and agitation requiring Ativan.  Dr. Fine spoke with patient's daughter who has elected only comfort care measures.  The discharge summary has been placed with plan is to discharge back to the [facility].  We are asked to assist with \"paperwork\". No family is present.   Review of Systems   Unable to perform ROS: dementia     1- Pain Assessment  PAINAD Breathin-->normal  PAINAD Negative Vocalization: 0-->none  PAINAD Facial Expression: 0-->smiling or inexpressive  PAINAD Body Language: 0-->relaxed  PAINAD Consolability: 0-->no need to console  PAINAD Score: 0    Past Medical History:   Diagnosis Date   • Dementia (CMS/HCC)    • Hypertension      Past Surgical History:   Procedure Laterality Date   • HYSTERECTOMY     • KNEE MENISCAL REPAIR       Social History     Socioeconomic History   • Marital status:      Spouse name: Not on file   • Number of children: Not on file   • Years of education: Not on file   • Highest education level: Not on file   Tobacco Use   • Smoking status: Never Smoker   • Smokeless tobacco: Never Used   Substance and Sexual Activity   • Alcohol use: Never   • Drug use: Never   • Sexual activity: Defer       Current " "Facility-Administered Medications   Medication Dose Route Frequency Provider Last Rate Last Admin   • cefdinir (OMNICEF) capsule 300 mg  300 mg Oral Q24H Arthur Fine MD       • LORazepam (ATIVAN) injection 1 mg  1 mg Intravenous Q4H PRN Arthur Fine MD   1 mg at 05/31/21 1446   • sodium chloride 0.9 % flush 10 mL  10 mL Intravenous PRN Emergency, Triage Protocol, MD            LORazepam  •  sodium chloride    Allergies   Allergen Reactions   • Sulfa Antibiotics Unknown - High Severity     Current medication reviewed for route, type, dose and frequency and are current per MAR at time of dictation.      Intake/Output Summary (Last 24 hours) at 6/1/2021 0930  Last data filed at 6/1/2021 0408  Gross per 24 hour   Intake --   Output 900 ml   Net -900 ml       Physical Exam:    Diagnostics: Reviewed  /56 (BP Location: Left arm, Patient Position: Lying)   Pulse 59   Temp 97.7 °F (36.5 °C) (Oral)   Resp 16   Ht 165.1 cm (65\")   Wt 81.6 kg (180 lb)   SpO2 97%   Breastfeeding No   BMI 29.95 kg/m²     Vitals and nursing note reviewed.   Constitutional:       Appearance: Ill-appearing and chronically ill-appearing.   Eyes:      General: Lids are normal.   HENT:      Head: Normocephalic.   Pulmonary:      Effort: Pulmonary effort is normal.   Cardiovascular:      Bradycardia present.   Edema:     Peripheral edema absent.   Abdominal:      General: Bowel sounds are normal.   Musculoskeletal:      Cervical back: Neck supple. Skin:     General: Skin is warm and dry.   Genitourinary:     Comments: Incontinence  Neurological:      Mental Status: Alert. Confused.   Psychiatric:         Cognition and Memory: Cognition is impaired.       Patient status: Disease state: Controlled with current treatments.  Functional status: Palliative Performance Scale Score: Performance 40% based on the following measures: Ambulation: Mainly in bed, Activity and Evidence of Disease: Unable to do any work, extensive " evidence of disease, Self-Care: Mainly assistance required,  Intake: Normal or reduced, LOC: Full, drowsy or confusion   Nutritional status: Albumin 3.50. Body mass index is 29.95 kg/m².         Family support: The patient receives support from her daughter..  Advance Directives: Advance Directive Status: Patient has advance directive, copy requested   POA/Healthcare surrogate-Carla Cassidy, daughter-POA.  A copy of POA paperwork has been obtained and scanned to EMR.    Impression/Problem List:    1.  Alzheimer's dementia-likely mixed with vascular dementia underlying chronic small vessel ischemic changes per CT the head  2.  Acute kidney injury  3.  Hypertension  4.  Anemia of chronic disease  5.  Diastolic dysfunction   6.  Advanced age  7.  Incontinence    Recommendations/Plan:  1. plan: Goals of care include CODE STATUS no CPR/comfort measures per attending.  2.  Palliative care encounter  -Call placed to daughter/POA without answer, left voicemail, awaiting call back.  -A discharge order has been placed by attending this morning with plans per attending note to discharge back to the [facility].  No family is currently present.  Will plan to discuss transitions to hospice and discharge plan options. Will assist with a MOST form is daughter and attending physician able to complete.    ADDENDUM-@4062 spoke with Carla Cassidy, daughter/POA via telephone.  We discussed patient's overall decline over the last couple weeks and concerns with regard to progressive dementia and falls.  We did discuss concerns this hospitalization due to patient's dehydration, poor oral intake, and high risk for falls. Daughter does not necessarily feel that patient has reached a juncture of failure to thrive.  She was taken aback with regard to  conversation of transitions to hospice initially.  We discussed discharge options to include back to the [facility] with hospice and 24/7 caregivers if needed versus nursing facility placement with  palliative care/hospice.  She is aware of possibility of private pay options.  We discussed current medical priorities are no CPR and comfort measures.  Daughter states that she plans to discuss care goals forward with her siblings this afternoon, but at this juncture she is leaning toward IV fluids for hydration and IV antibiotics as needed.  We discussed completion of a MOST form document based upon this conversation.  A copy of a MOST form and nursing facility options has been emailed per daughter's request.  Again continuing family conference to further delineate care goals.    ADDENDUM-@2:20 PM again spoke with Carla Cassidy, daughter/POA via telephone who verbalizes she has had the opportunity to speak with her siblings and reviewed the MOST document.  Family would like to transition with de-escalation measures back to the [facility] assisted living [facility] with hospice services.  Family planning to meet with Bharti liaeugenia with the [facility] to set up sitters and plan for hospice services.  A hospice consult has been placed.  A MOST document has been reviewed and initiated and we will have attending complete.  Relayed plan to Dr. Fine electronically.      Thank you for this consult and allowing us to participate in patient's plan of care. Palliative Care Team will continue to follow patient.     Time spent: 99 minutes spent reviewing medical and medication records, assessing and examining patient, discussing with family, answering questions, providing some guidance about a plan and documentation of care, and coordinating care with other healthcare members, with > 50% time spent face to face.     LEO Samayoa  6/1/2021                Electronically signed by Bijal Hansen APRN at 06/01/21 1435       Nutrition Notes (last 24 hours) (Notes from 05/31/21 1520 through 06/01/21 1520)    No notes exist for this encounter.         Physical Therapy Notes (last 24 hours) (Notes from 05/31/21 1520  through 21 1520)    No notes exist for this encounter.         Occupational Therapy Notes (last 24 hours) (Notes from 21 1520 through 21 1520)    No notes exist for this encounter.         Speech Language Pathology Notes (last 24 hours) (Notes from 21 1520 through 21 1520)    No notes exist for this encounter.         Respiratory Therapy Notes (last 24 hours) (Notes from 21 1520 through 21 1520)    No notes exist for this encounter.       [address] [address]  Phone:  [phone]  Fax:  [phone]        Patient:     Nichole Mcgregor MRN:  [id number]   CO KARUNA BURTON  [address] [facility] [address] :  3/31/1933  SSN:    Phone: [phone] Sex:  F      INSURANCE PAYOR PLAN GROUP # SUBSCRIBER ID   Primary:    [employer] MEDICARE REPLACEMENT [id number]   Admitting Diagnosis: Acute renal failure, unspecified acute renal failure type (CMS/HCC) [N17.9]  Order Date:  2021         Inpatient Hospice / [facility] Consult       (Order ID: [id number])     Diagnosis:         Priority:  Routine Expected Date:   Expiration Date:        Interval:   Count:    Reason for Consult? [place], daughter/family meeting with Bharti at the [facility] tomorrow to arrange sitters     Specimen Type:   Specimen Source:   Specimen Taken Date:   Specimen Taken Time:                   Authorizing Provider:Bijal Hansen APRN  Authorizing Provider's NPI: [id number]  Order Entered By: Bijal Hansen APRN 2021  2:29 PM     Electronically signed by: Bijal Hansen APRN 2021  2:29 PM

## 2021-06-01 NOTE — PLAN OF CARE
Goal Outcome Evaluation:         Patient disoriented x4, VSS. Slept most of shift, when awake, confused but pleasant. Discharge today. Discharge paperwork reviewed with daughter at bedside. All questions answered. No additional questions at this time. IV removed, catheter intact. Safety maintained. Lift team called for help with discharge to car @ [phone]

## 2021-06-01 NOTE — CASE MANAGEMENT/SOCIAL WORK
Continued Stay Note   Chaim     Patient Name: Nichole Mcgregor  MRN: [id number]  Today's Date: 6/1/2021    Admit Date: 5/31/2021    Discharge Plan     Row Name 06/01/21 1524       Plan    Plan  Referral to [facility]    Patient/Family in Agreement with Plan  yes    Plan Comments  Pt has order for hospice.  Spoke with Karley from [facility] [phone] and provided referral.        Discharge Codes    No documentation.       Expected Discharge Date and Time     Expected Discharge Date Expected Discharge Time    Jun 1, 2021             SEDA Leon

## 2021-06-01 NOTE — PLAN OF CARE
Goal Outcome Evaluation:  Plan of Care Reviewed With: patient  Progress: no change  Outcome Summary: Pt remains Disoriented x4. Pt remains asleep for most of the shift. Uncooperative and easily aggitated when RN asks pt to do simple commands. Purwic remains in place, voiding. Q2 turning when pt allows. No neuro changes.VSS. Safety maintained.

## 2021-06-02 NOTE — OUTREACH NOTE
Prep Survey      Responses   [facility] facility patient discharged from?  [place]   Is LACE score < 7 ?  No   Emergency Room discharge w/ pulse ox?  No   Eligibility  Not Eligible   What are the reasons patient is not eligible?  Hospice/Pallative Care   Does the patient have one of the following disease processes/diagnoses(primary or secondary)?  Other   Prep survey completed?  Yes          Bharti Leon RN

## 2021-08-19 PROBLEM — G30.1 DEMENTIA OF THE ALZHEIMER'S TYPE, WITH LATE ONSET, WITH DELIRIUM (HCC): Status: ACTIVE | Noted: 2021-08-19

## 2021-08-19 PROBLEM — F02.82 DEMENTIA OF THE ALZHEIMER'S TYPE, WITH LATE ONSET, WITH DELIRIUM (HCC): Status: ACTIVE | Noted: 2021-08-19

## 2022-03-23 NOTE — DISCHARGE SUMMARY
Hospital Discharge Summary    Tuan Canada  :  3/31/1933  MRN:  [id number]    Admit date:  10/23/2020  Discharge date:  2020    Admitting Physician:    Toño Cho MD    Discharge Diagnoses:    Principal Problem:    UTI (urinary tract infection)  Active Problems:    Vascular dementia ([address]    CKD (chronic kidney disease), stage III    Essential hypertension    Infectious encephalopathy      Hospital Course: The patient was admitted for the above noted medical/surgical issues. Please see daily progress note for further details concerning their stay. The patient improved throughout their stay and reached maximum medical improvement on the day of discharge. The patient/family agree with the treatment plan as outlined above. All questions concerning their stay were answered prior to discharge. They understand the importance of follow up concerning any abnormal test results. 66-year-old female has known vascular dementia and a history of recurrent UTIs who presented for worsening confusion and febrile illness. She resides at the [facility]. Urine culture and sensitivity showed both E. coli for which ceftriaxone will be given daily x3 doses and Enterococcus faecalis for which tetracycline 500 mg twice daily will be given x7 days. Patient was covered during hospitalization with IV Zosyn. Recommend repeat clean-catch midstream urinalysis 1 week's time.   Discharge Medications:       Pandya Carter   Home Medication Instructions PDR:[id number]    Printed on:10/27/20 0746   Medication Information                 Ceftriaxone 1 g IM daily x3    Tetracycline 500 mg p.o. twice daily x7 days           acetaminophen (TYLENOL) 500 MG tablet  Take 1 tablet by mouth nightly as needed (sleep)             allopurinol (ZYLOPRIM) 100 MG tablet  Take 100 mg by mouth daily             amLODIPine (NORVASC) 5 MG tablet  Take 1 tablet by mouth daily             aspirin 81 MG chewable tablet  Take 81 Patients  advised. Says he will  in the morning,    mg by mouth daily             benazepril (LOTENSIN) 40 MG tablet  Take 40 mg by mouth daily             Ergocalciferol (VITAMIN D2 PO)  Take 50,000 Units by mouth twice a week On Sunday and Thursday             escitalopram (LEXAPRO) 20 MG tablet  Take 20 mg by mouth daily             hydroCHLOROthiazide (HYDRODIURIL) 12.5 MG tablet  Take 12.5 mg by mouth daily             memantine ER (NAMENDA XR) 28 MG CP24 extended release capsule  Take 28 mg by mouth daily             metoprolol succinate (TOPROL XL) 50 MG extended release tablet  Take 50 mg by mouth daily                 Consults:   Case management   palliative care    Significant Diagnostic Studies:    See summary of labs/x-rays/path report for further details      Treatments:   IV antibiotics treatment of urosepsis    Disposition:   Stable but guarded thank you 66-year-old female with multiple comorbidities end-stage Alzheimer's vascular dementia  Follow up with Evelyn chung physician assistant within 1 week per Medicare guidelines. To be scheduled. Telemedicine    Signed:  Nabil Swift   10/27/2020, 7:46 AM       Urinary tract infection present on admission with multi bacteria. Please see plan of treatment above. Medically stable for return to the [facility]. I have discussed the care of Tuan Hunter, including pertinent history and exam findings with the ARNP/PA. I have seen and examined the patient and the key elements of all parts of the encounter have been performed by me. I agree with the assessment and plan as outlined by the ARNP/PA. Please refer to my separate note for complete documentation.      Electronically signed by Lobo Marlow MD on 10/27/2020 at 8:11 AM

## 2024-12-11 NOTE — DISCHARGE SUMMARY
Hospital Discharge Summary    Nichole Mcgregor  :  3/31/1933  MRN:  [id number]    Admit date:  2021  Discharge date:      Admitting Physician:    Arthur Fien MD    Discharge Diagnoses:      Acute renal failure (CMS/HCC)    Alzheimer's dementia, late onset, with behavioral disturbance (CMS/HCC)    Essential hypertension    Anemia, chronic disease    Bradycardia      Hospital Course:   The patient was admitted for the above surgical/medical indication.  Please see admission H&P for further details concerning the admission.  The patient was seen daily and progress noted via daily updates in the progress notes.  The patient improved throughout her stay.  They reached maximum medical improvement and were considered stable for discharge home.  They understand the importance of follow-up concerning any abnormal lab values/x-rays.  All questions were answered to the best of my ability prior to their discharge home.    88-year-old white female with significant dementia presents from assisted care living with decreased oral intake and not acting herself.  Patient's daughter says when a weather front comes through she becomes more confused and disoriented.  Limited oral intake.  Presented to the emergency room with elevated BUN creatinine and acute renal failure.  Patient was gently rehydrated.    On the morning of  a shae and open discussion was held with the daughter.  She wants only comfort care measures for her mother.  Her mother is stated that this type of life and situation is not worth living, she is ready to join her  in heaven.  She is a DNR.  Will have palliative care visit with patient daughter today which can be done via phone as she is working.  Objectives would be to make sure everybody on the same page as there are multiple daughters and all the paperwork is filled out including forms at the [facility], for transportation as well as here at [facility].  My goal  Report given to Dottie MOCTEZUMA. Questions answered.      Indira Camacho RN  12/11/24 2741     would be to keep her at the [facility] as long as possible and prevent transportation to the hospital in the future.      Discharge Medications:         Discharge Medications      New Medications      Instructions Start Date   cefdinir 300 MG capsule  Commonly known as: OMNICEF   300 mg, Oral, Every 24 Hours Scheduled         Changes to Medications      Instructions Start Date   benazepril 5 MG tablet  Commonly known as: LOTENSIN  What changed:   · medication strength  · how much to take   10 mg, Oral, Daily         Continue These Medications      Instructions Start Date   aspirin 81 MG EC tablet   81 mg, Oral, Daily      diphenhydrAMINE-acetaminophen  MG tablet per tablet  Commonly known as: TYLENOL PM   1 tablet, Oral, Nightly PRN      escitalopram 20 MG tablet  Commonly known as: LEXAPRO   20 mg, Oral, Daily      hydroCHLOROthiazide 12.5 MG tablet  Commonly known as: HYDRODIURIL   12.5 mg, Oral, Daily      memantine 28 MG capsule sustained-release 24 hr extended release capsule  Commonly known as: NAMENDA XR   28 mg, Oral, Daily      metoprolol succinate XL 25 MG 24 hr tablet  Commonly known as: TOPROL-XL   25 mg, Oral, Daily         Stop These Medications    rivaroxaban 20 MG tablet  Commonly known as: XARELTO        ASK your doctor about these medications      Instructions Start Date   furosemide 20 MG tablet  Commonly known as: LASIX   20 mg, Oral, Daily             Consults: None    Significant Diagnostic Studies:      EKG: normal EKG, normal sinus rhythm, unchanged from previous tracings.      Treatments:   IV fluid rehydration    Disposition:   [facility]  Follow up with Arthur Fine MD in 3 or 4 days as required by Medicare for hospital follow-up this can be done with LEO Krishnan via telemed..    Signed:  Arthur Fine MD   6/1/2021, 08:12 CDT

## 2025-06-09 NOTE — PLAN OF CARE
CORTICOSTEROID INJECTION  What is a corticosteroid?  Injuries or disease such as arthritis, bursitis or tendonitis result in inflammation.  In turn, this inflammation can cause swelling and pain.  A local injection of a corticosteroid is provided to diminish inflammation.  By doing so, it will also decrease pain and swelling which is making you uncomfortable.     Is this the same thing as a Cortisone Injection?  Cortisone® is a brand name of a corticosteroid used commonly in the past.  Today I commonly use a more water-soluble corticosteroid named Celestone®.    Will the injection hurt?  As with any injection, you may feel pain at the time of the injection. Typically, I use a local anesthetic (Marcaine®) in addition to the corticosteroid to determine if the injection has been placed in the appropriate location.  Hence it is important to monitor your symptoms 4-6 hours after the injection, as the area will be anesthetized (numb) while the local anesthetic is working.  Once the local anesthetic wears off, the intensity of pain can be the same as it was prior to the injection, or even worse.  This does not mean that the injection is not working.  The corticosteroid may take 24-72 hours to begin having a positive effect.    If you do experience an increase in pain, the use of an ice pack on the area for 20 minutes at a time should help.  It is also helpful to take an oral anti-inflammatory such as Tylenol® or Motrin® if you are able to medically do so.  For this reason it is best to avoid activities that put stress on the area the first 24 hours after the injection.    How long will pain relief last?  This will vary according to the type and severity of the symptoms being treated and the severity of the condition.  Symptom relief may last weeks to months.  I typically couple injections with physical therapy so that the underlying problem causing the inflammation may be treated as the pain diminishes.  If the combination  Goal Outcome Evaluation:  Plan of Care Reviewed With: patient  Progress: improving  Outcome Summary: Pt had no c/o pain this shift. Safety maintained. NIH-11 this AM. Pt more alert and responsive this afternoon. No other neuro changes. Pt tolerating regular diet with thin liquids. IVF infusing per MD order. Cont IV abx as ordered. Pt confused alert to person only. Pt turned and repositioned Q 2 hours and PRN. No new skin breakdown noted. Cont to monitor.   is not successful, you may be a surgical candidate.    I have read bad things about steroids.  Will these things happen to me?  Corticosteroids, when utilized properly, are safe and effective drugs.  When used in a low dose, potential adverse reactions are very rare.  Some patients may experience a sensation of flushing for several days.  Very rarely, there can be a local reaction which may include increased discomfort for a period of time in the areas that has been injected.  A steroid should not be used over and over again.  Multiple injections in the same area can produce adverse effects such as tissue atrophy and degeneration of tendon or cartilage.  A small percentage of patients (< 0.1%) may develop an infection in the joint after injection.  This is a treatable problem, but if neglected, may result in permanent disability.  Signs of infection include redness, swelling, discharge, fevers, increasing pain and drainage from the injection site.  This represents an emergency and you should contact our office immediately or seek treatment in the ER if after hours.    If I have diabetes, will this injection affect me?  If you are diabetic, an injection of a corticosteroid can raise your blood sugar level, requiring more insulin for a brief period of time.  This may necessitate careful blood sugar maintenance.  If the elevated sugars are not able to be controlled, contact your diabetic doctor for guidance.